# Patient Record
Sex: MALE | Race: WHITE | NOT HISPANIC OR LATINO
[De-identification: names, ages, dates, MRNs, and addresses within clinical notes are randomized per-mention and may not be internally consistent; named-entity substitution may affect disease eponyms.]

---

## 2017-01-19 ENCOUNTER — APPOINTMENT (OUTPATIENT)
Dept: HEMATOLOGY ONCOLOGY | Facility: CLINIC | Age: 82
End: 2017-01-19

## 2017-01-19 VITALS
TEMPERATURE: 97.6 F | BODY MASS INDEX: 22.07 KG/M2 | HEIGHT: 69 IN | WEIGHT: 149 LBS | RESPIRATION RATE: 12 BRPM | OXYGEN SATURATION: 97 % | HEART RATE: 76 BPM | SYSTOLIC BLOOD PRESSURE: 122 MMHG | DIASTOLIC BLOOD PRESSURE: 60 MMHG

## 2017-02-23 ENCOUNTER — APPOINTMENT (OUTPATIENT)
Dept: HEMATOLOGY ONCOLOGY | Facility: CLINIC | Age: 82
End: 2017-02-23

## 2017-02-23 ENCOUNTER — LABORATORY RESULT (OUTPATIENT)
Age: 82
End: 2017-02-23

## 2017-02-23 LAB
BASOPHILS NFR BLD AUTO: 0.3 %
EOSINOPHIL NFR BLD AUTO: 1.7 %
HCT VFR BLD CALC: 31.3 %
HGB BLD-MCNC: 10.6 G/DL
LYMPHOCYTES NFR BLD AUTO: 66.1 %
MAN DIFF?: NO
MCHC RBC-ENTMCNC: 31.3 PG
MCHC RBC-ENTMCNC: 33.9 G/DL
MCV RBC AUTO: 92.3 FL
MONOCYTES NFR BLD AUTO: 5.6 %
NEUTROPHILS NFR BLD AUTO: 26.3 %
PLATELET # BLD AUTO: 86 K/UL
RBC # BLD: 3.39 M/UL
RBC # FLD: 14.6 %
WBC # FLD AUTO: 7.7 K/UL

## 2017-03-09 ENCOUNTER — MESSAGE (OUTPATIENT)
Age: 82
End: 2017-03-09

## 2017-06-28 ENCOUNTER — APPOINTMENT (OUTPATIENT)
Dept: HEMATOLOGY ONCOLOGY | Facility: CLINIC | Age: 82
End: 2017-06-28

## 2017-06-28 ENCOUNTER — LABORATORY RESULT (OUTPATIENT)
Age: 82
End: 2017-06-28

## 2017-06-28 VITALS
WEIGHT: 149 LBS | HEART RATE: 70 BPM | OXYGEN SATURATION: 97 % | SYSTOLIC BLOOD PRESSURE: 130 MMHG | DIASTOLIC BLOOD PRESSURE: 70 MMHG | BODY MASS INDEX: 22.07 KG/M2 | TEMPERATURE: 98.2 F | RESPIRATION RATE: 14 BRPM | HEIGHT: 69 IN

## 2017-06-29 LAB
25(OH)D3 SERPL-MCNC: 47.7 NG/ML
BASOPHILS # BLD AUTO: 0.02 K/UL
BASOPHILS NFR BLD AUTO: 0.2 %
EOSINOPHIL # BLD AUTO: 0.09 K/UL
EOSINOPHIL NFR BLD AUTO: 1 %
ERYTHROCYTE [SEDIMENTATION RATE] IN BLOOD BY WESTERGREN METHOD: 4 MM/HR
FERRITIN SERPL-MCNC: 124 NG/ML
HAPTOGLOB SERPL-MCNC: 31 MG/DL
HCT VFR BLD CALC: 34.1 %
HGB BLD-MCNC: 10.8 G/DL
IMM GRANULOCYTES NFR BLD AUTO: 0 %
IRON SATN MFR SERPL: 28 %
IRON SERPL-MCNC: 77 UG/DL
LDH SERPL-CCNC: 261 U/L
LYMPHOCYTES # BLD AUTO: 6.71 K/UL
LYMPHOCYTES NFR BLD AUTO: 76.1 %
MAN DIFF?: NORMAL
MCHC RBC-ENTMCNC: 30.3 PG
MCHC RBC-ENTMCNC: 31.7 GM/DL
MCV RBC AUTO: 95.8 FL
MONOCYTES # BLD AUTO: 0.34 K/UL
MONOCYTES NFR BLD AUTO: 3.9 %
NEUTROPHILS # BLD AUTO: 1.66 K/UL
NEUTROPHILS NFR BLD AUTO: 18.8 %
PLATELET # BLD AUTO: 93 K/UL
RBC # BLD: 3.56 M/UL
RBC # FLD: 15.1 %
TIBC SERPL-MCNC: 272 UG/DL
UIBC SERPL-MCNC: 195 UG/DL
VIT B12 SERPL-MCNC: 609 PG/ML
WBC # FLD AUTO: 8.82 K/UL

## 2017-06-30 ENCOUNTER — APPOINTMENT (OUTPATIENT)
Dept: HEART AND VASCULAR | Facility: CLINIC | Age: 82
End: 2017-06-30

## 2017-06-30 VITALS — SYSTOLIC BLOOD PRESSURE: 140 MMHG | DIASTOLIC BLOOD PRESSURE: 70 MMHG

## 2017-06-30 VITALS
HEIGHT: 69 IN | BODY MASS INDEX: 22.22 KG/M2 | SYSTOLIC BLOOD PRESSURE: 160 MMHG | RESPIRATION RATE: 14 BRPM | TEMPERATURE: 97.2 F | WEIGHT: 150.03 LBS | OXYGEN SATURATION: 97 % | DIASTOLIC BLOOD PRESSURE: 72 MMHG | HEART RATE: 68 BPM

## 2017-06-30 DIAGNOSIS — Z87.891 PERSONAL HISTORY OF NICOTINE DEPENDENCE: ICD-10-CM

## 2017-06-30 DIAGNOSIS — Z78.9 OTHER SPECIFIED HEALTH STATUS: ICD-10-CM

## 2017-09-14 ENCOUNTER — APPOINTMENT (OUTPATIENT)
Dept: HEMATOLOGY ONCOLOGY | Facility: CLINIC | Age: 82
End: 2017-09-14
Payer: MEDICARE

## 2017-09-14 ENCOUNTER — LABORATORY RESULT (OUTPATIENT)
Age: 82
End: 2017-09-14

## 2017-09-14 VITALS
OXYGEN SATURATION: 97 % | DIASTOLIC BLOOD PRESSURE: 62 MMHG | TEMPERATURE: 97.7 F | HEART RATE: 71 BPM | SYSTOLIC BLOOD PRESSURE: 124 MMHG | HEIGHT: 69 IN | BODY MASS INDEX: 21.48 KG/M2 | RESPIRATION RATE: 14 BRPM | WEIGHT: 145 LBS

## 2017-09-14 PROCEDURE — G0008: CPT

## 2017-09-14 PROCEDURE — 99214 OFFICE O/P EST MOD 30 MIN: CPT | Mod: 25

## 2017-09-14 PROCEDURE — 36415 COLL VENOUS BLD VENIPUNCTURE: CPT

## 2017-09-14 PROCEDURE — 90662 IIV NO PRSV INCREASED AG IM: CPT

## 2017-09-15 LAB
ALBUMIN SERPL ELPH-MCNC: 4.5 G/DL
ALP BLD-CCNC: 70 U/L
ALT SERPL-CCNC: 9 U/L
ANION GAP SERPL CALC-SCNC: 17 MMOL/L
AST SERPL-CCNC: 24 U/L
BILIRUB SERPL-MCNC: 0.8 MG/DL
BUN SERPL-MCNC: 42 MG/DL
CALCIUM SERPL-MCNC: 9.4 MG/DL
CHLORIDE SERPL-SCNC: 101 MMOL/L
CO2 SERPL-SCNC: 25 MMOL/L
CREAT SERPL-MCNC: 2.09 MG/DL
ERYTHROCYTE [SEDIMENTATION RATE] IN BLOOD BY WESTERGREN METHOD: 5 MM/HR
FERRITIN SERPL-MCNC: 138 NG/ML
GLUCOSE SERPL-MCNC: 99 MG/DL
HAPTOGLOB SERPL-MCNC: 35 MG/DL
IRON SATN MFR SERPL: 29 %
IRON SERPL-MCNC: 75 UG/DL
LDH SERPL-CCNC: 299 U/L
POTASSIUM SERPL-SCNC: 4.8 MMOL/L
PROT SERPL-MCNC: 7.2 G/DL
SODIUM SERPL-SCNC: 143 MMOL/L
TIBC SERPL-MCNC: 259 UG/DL
UIBC SERPL-MCNC: 184 UG/DL
VIT B12 SERPL-MCNC: 757 PG/ML

## 2017-09-18 ENCOUNTER — APPOINTMENT (OUTPATIENT)
Dept: HEART AND VASCULAR | Facility: CLINIC | Age: 82
End: 2017-09-18
Payer: MEDICARE

## 2017-09-18 VITALS
BODY MASS INDEX: 21.62 KG/M2 | WEIGHT: 146 LBS | SYSTOLIC BLOOD PRESSURE: 98 MMHG | RESPIRATION RATE: 14 BRPM | HEART RATE: 70 BPM | DIASTOLIC BLOOD PRESSURE: 62 MMHG | OXYGEN SATURATION: 96 % | HEIGHT: 69 IN | TEMPERATURE: 98.1 F

## 2017-09-18 LAB
BASOPHILS # BLD AUTO: 0.02 K/UL
BASOPHILS NFR BLD AUTO: 0.2 %
EOSINOPHIL # BLD AUTO: 0.08 K/UL
EOSINOPHIL NFR BLD AUTO: 1 %
HCT VFR BLD CALC: 33.6 %
HGB BLD-MCNC: 11.1 G/DL
IMM GRANULOCYTES NFR BLD AUTO: 0 %
LYMPHOCYTES # BLD AUTO: 5.98 K/UL
LYMPHOCYTES NFR BLD AUTO: 71.4 %
MAN DIFF?: NORMAL
MCHC RBC-ENTMCNC: 31.4 PG
MCHC RBC-ENTMCNC: 33 GM/DL
MCV RBC AUTO: 95.2 FL
MONOCYTES # BLD AUTO: 0.36 K/UL
MONOCYTES NFR BLD AUTO: 4.3 %
NEUTROPHILS # BLD AUTO: 1.93 K/UL
NEUTROPHILS NFR BLD AUTO: 23.1 %
PLATELET # BLD AUTO: 92 K/UL
RBC # BLD: 3.53 M/UL
RBC # FLD: 14.4 %
WBC # FLD AUTO: 8.37 K/UL

## 2017-09-18 PROCEDURE — 99213 OFFICE O/P EST LOW 20 MIN: CPT | Mod: 25

## 2017-09-18 PROCEDURE — 93306 TTE W/DOPPLER COMPLETE: CPT

## 2017-09-18 RX ORDER — ISOSORBIDE MONONITRATE 30 MG/1
30 TABLET, EXTENDED RELEASE ORAL DAILY
Qty: 30 | Refills: 2 | Status: DISCONTINUED | COMMUNITY
Start: 2017-06-30 | End: 2017-09-18

## 2017-09-26 ENCOUNTER — CLINICAL ADVICE (OUTPATIENT)
Age: 82
End: 2017-09-26

## 2017-10-03 ENCOUNTER — TRANSCRIPTION ENCOUNTER (OUTPATIENT)
Age: 82
End: 2017-10-03

## 2017-10-04 ENCOUNTER — APPOINTMENT (OUTPATIENT)
Dept: HEART AND VASCULAR | Facility: CLINIC | Age: 82
End: 2017-10-04
Payer: MEDICARE

## 2017-10-04 PROCEDURE — A9500: CPT

## 2017-10-04 PROCEDURE — 78452 HT MUSCLE IMAGE SPECT MULT: CPT

## 2017-10-04 PROCEDURE — 99213 OFFICE O/P EST LOW 20 MIN: CPT | Mod: 25

## 2017-10-04 PROCEDURE — 93015 CV STRESS TEST SUPVJ I&R: CPT

## 2017-10-05 ENCOUNTER — LABORATORY RESULT (OUTPATIENT)
Age: 82
End: 2017-10-05

## 2017-10-05 ENCOUNTER — APPOINTMENT (OUTPATIENT)
Dept: NEPHROLOGY | Facility: CLINIC | Age: 82
End: 2017-10-05
Payer: MEDICARE

## 2017-10-05 VITALS
SYSTOLIC BLOOD PRESSURE: 128 MMHG | BODY MASS INDEX: 21.18 KG/M2 | TEMPERATURE: 98.1 F | RESPIRATION RATE: 14 BRPM | OXYGEN SATURATION: 96 % | WEIGHT: 143 LBS | HEART RATE: 71 BPM | DIASTOLIC BLOOD PRESSURE: 82 MMHG | HEIGHT: 69 IN

## 2017-10-05 PROCEDURE — 36415 COLL VENOUS BLD VENIPUNCTURE: CPT

## 2017-10-05 PROCEDURE — 99215 OFFICE O/P EST HI 40 MIN: CPT | Mod: 25

## 2017-10-06 LAB
25(OH)D3 SERPL-MCNC: 49.5 NG/ML
ALBUMIN SERPL ELPH-MCNC: 4.8 G/DL
ALP BLD-CCNC: 82 U/L
ALT SERPL-CCNC: 14 U/L
ANION GAP SERPL CALC-SCNC: 18 MMOL/L
APPEARANCE: CLEAR
AST SERPL-CCNC: 23 U/L
BACTERIA: NEGATIVE
BILIRUB SERPL-MCNC: 0.9 MG/DL
BILIRUBIN URINE: NEGATIVE
BLOOD URINE: ABNORMAL
BUN SERPL-MCNC: 45 MG/DL
CALCIUM SERPL-MCNC: 9.5 MG/DL
CALCIUM SERPL-MCNC: 9.5 MG/DL
CHLORIDE SERPL-SCNC: 102 MMOL/L
CO2 SERPL-SCNC: 21 MMOL/L
COLOR: YELLOW
CREAT SERPL-MCNC: 1.67 MG/DL
CREAT SPEC-SCNC: 115 MG/DL
CREAT/PROT UR: 0.1 RATIO
GLUCOSE QUALITATIVE U: NEGATIVE MG/DL
GLUCOSE SERPL-MCNC: 102 MG/DL
KETONES URINE: NEGATIVE
LEUKOCYTE ESTERASE URINE: NEGATIVE
MAGNESIUM SERPL-MCNC: 1.8 MG/DL
MICROSCOPIC-UA: NORMAL
NITRITE URINE: NEGATIVE
PARATHYROID HORMONE INTACT: 86 PG/ML
PH URINE: 5
PHOSPHATE SERPL-MCNC: 3.8 MG/DL
POTASSIUM SERPL-SCNC: 4.4 MMOL/L
PROT SERPL-MCNC: 7.1 G/DL
PROT UR-MCNC: 6 MG/DL
PROTEIN URINE: NEGATIVE MG/DL
RED BLOOD CELLS URINE: 6 /HPF
SODIUM SERPL-SCNC: 141 MMOL/L
SPECIFIC GRAVITY URINE: 1.02
SQUAMOUS EPITHELIAL CELLS: 0 /HPF
URATE SERPL-MCNC: 9.8 MG/DL
UROBILINOGEN URINE: NEGATIVE MG/DL
WHITE BLOOD CELLS URINE: 2 /HPF

## 2017-10-10 LAB
APPEARANCE: CLEAR
BACTERIA: NEGATIVE
BILIRUBIN URINE: NEGATIVE
BLOOD URINE: ABNORMAL
COLOR: YELLOW
CREAT SPEC-SCNC: 79 MG/DL
GLUCOSE QUALITATIVE U: NEGATIVE MG/DL
HYALINE CASTS: 1 /LPF
KETONES URINE: NEGATIVE
LEUKOCYTE ESTERASE URINE: NEGATIVE
MICROALBUMIN 24H UR DL<=1MG/L-MCNC: 1.6 MG/DL
MICROALBUMIN/CREAT 24H UR-RTO: 20 MG/G
MICROSCOPIC-UA: NORMAL
NITRITE URINE: NEGATIVE
PH URINE: 5
PROTEIN URINE: NEGATIVE MG/DL
RED BLOOD CELLS URINE: 2 /HPF
SPECIFIC GRAVITY URINE: 1.01
SQUAMOUS EPITHELIAL CELLS: 0 /HPF
UROBILINOGEN URINE: NEGATIVE MG/DL
WHITE BLOOD CELLS URINE: 0 /HPF

## 2017-10-11 ENCOUNTER — MESSAGE (OUTPATIENT)
Age: 82
End: 2017-10-11

## 2017-10-13 ENCOUNTER — MESSAGE (OUTPATIENT)
Age: 82
End: 2017-10-13

## 2017-12-13 ENCOUNTER — APPOINTMENT (OUTPATIENT)
Dept: HEMATOLOGY ONCOLOGY | Facility: CLINIC | Age: 82
End: 2017-12-13
Payer: MEDICARE

## 2017-12-13 ENCOUNTER — LABORATORY RESULT (OUTPATIENT)
Age: 82
End: 2017-12-13

## 2017-12-13 VITALS
OXYGEN SATURATION: 96 % | SYSTOLIC BLOOD PRESSURE: 108 MMHG | HEART RATE: 66 BPM | WEIGHT: 144 LBS | TEMPERATURE: 98.3 F | BODY MASS INDEX: 21.33 KG/M2 | DIASTOLIC BLOOD PRESSURE: 62 MMHG | HEIGHT: 69 IN | RESPIRATION RATE: 14 BRPM

## 2017-12-13 PROCEDURE — 36415 COLL VENOUS BLD VENIPUNCTURE: CPT

## 2017-12-13 PROCEDURE — 99213 OFFICE O/P EST LOW 20 MIN: CPT | Mod: 25

## 2017-12-15 LAB
ALBUMIN SERPL ELPH-MCNC: 4.2 G/DL
ALP BLD-CCNC: 76 U/L
ALT SERPL-CCNC: 10 U/L
ANION GAP SERPL CALC-SCNC: 17 MMOL/L
AST SERPL-CCNC: 17 U/L
BASOPHILS # BLD AUTO: 0.03 K/UL
BASOPHILS NFR BLD AUTO: 0.4 %
BILIRUB SERPL-MCNC: 0.9 MG/DL
BUN SERPL-MCNC: 46 MG/DL
CALCIUM SERPL-MCNC: 9.3 MG/DL
CHLORIDE SERPL-SCNC: 102 MMOL/L
CO2 SERPL-SCNC: 24 MMOL/L
CREAT SERPL-MCNC: 1.75 MG/DL
EOSINOPHIL # BLD AUTO: 0.11 K/UL
EOSINOPHIL NFR BLD AUTO: 1.4
GLUCOSE SERPL-MCNC: 115 MG/DL
HCT VFR BLD CALC: 31.9 %
HGB BLD-MCNC: 10.3 G/DL
IMM GRANULOCYTES NFR BLD AUTO: 0.1 %
LYMPHOCYTES # BLD AUTO: 5.41 K/UL
LYMPHOCYTES NFR BLD AUTO: 69.2 %
MAN DIFF?: NORMAL
MCHC RBC-ENTMCNC: 31.3 PG
MCHC RBC-ENTMCNC: 32.3 GM/DL
MCV RBC AUTO: 97 FL
MONOCYTES # BLD AUTO: 0.36 K/UL
MONOCYTES NFR BLD AUTO: 4.6 %
NEUTROPHILS # BLD AUTO: 1.9 K/UL
NEUTROPHILS NFR BLD AUTO: 24.3 %
PLATELET # BLD AUTO: 95 K/UL
POTASSIUM SERPL-SCNC: 5 MMOL/L
PROT SERPL-MCNC: 6.7 G/DL
RBC # BLD: 3.29 M/UL
RBC # FLD: 14.2 %
SODIUM SERPL-SCNC: 143 MMOL/L
WBC # FLD AUTO: 7.82 K/UL

## 2018-03-28 ENCOUNTER — LABORATORY RESULT (OUTPATIENT)
Age: 83
End: 2018-03-28

## 2018-03-28 ENCOUNTER — APPOINTMENT (OUTPATIENT)
Dept: HEMATOLOGY ONCOLOGY | Facility: CLINIC | Age: 83
End: 2018-03-28
Payer: MEDICARE

## 2018-03-28 PROCEDURE — 99214 OFFICE O/P EST MOD 30 MIN: CPT | Mod: 25

## 2018-03-28 PROCEDURE — 36415 COLL VENOUS BLD VENIPUNCTURE: CPT

## 2018-03-29 LAB
ALBUMIN SERPL ELPH-MCNC: 4.1 G/DL
ALP BLD-CCNC: 55 U/L
ALT SERPL-CCNC: 10 U/L
ANION GAP SERPL CALC-SCNC: 13 MMOL/L
AST SERPL-CCNC: 17 U/L
BASOPHILS # BLD AUTO: 0.01 K/UL
BASOPHILS NFR BLD AUTO: 0.1 %
BILIRUB SERPL-MCNC: 0.6 MG/DL
BUN SERPL-MCNC: 36 MG/DL
CALCIUM SERPL-MCNC: 9.2 MG/DL
CHLORIDE SERPL-SCNC: 103 MMOL/L
CO2 SERPL-SCNC: 26 MMOL/L
CREAT SERPL-MCNC: 1.56 MG/DL
EOSINOPHIL # BLD AUTO: 0.11 K/UL
EOSINOPHIL NFR BLD AUTO: 1.3 %
ERYTHROCYTE [SEDIMENTATION RATE] IN BLOOD BY WESTERGREN METHOD: 5 MM/HR
GLUCOSE SERPL-MCNC: 87 MG/DL
HCT VFR BLD CALC: 30.4 %
HGB BLD-MCNC: 9.7 G/DL
IMM GRANULOCYTES NFR BLD AUTO: 0.1 %
LDH SERPL-CCNC: 280 U/L
LYMPHOCYTES # BLD AUTO: 5.07 K/UL
LYMPHOCYTES NFR BLD AUTO: 62.2 %
MAN DIFF?: NORMAL
MCHC RBC-ENTMCNC: 30.6 PG
MCHC RBC-ENTMCNC: 31.9 GM/DL
MCV RBC AUTO: 95.9 FL
MONOCYTES # BLD AUTO: 0.41 K/UL
MONOCYTES NFR BLD AUTO: 5 %
NEUTROPHILS # BLD AUTO: 2.54 K/UL
NEUTROPHILS NFR BLD AUTO: 31.3 %
PLATELET # BLD AUTO: 116 K/UL
POTASSIUM SERPL-SCNC: 5.1 MMOL/L
PROT SERPL-MCNC: 6.7 G/DL
RBC # BLD: 3.17 M/UL
RBC # FLD: 14.7 %
SODIUM SERPL-SCNC: 142 MMOL/L
WBC # FLD AUTO: 8.15 K/UL

## 2018-07-03 ENCOUNTER — APPOINTMENT (OUTPATIENT)
Dept: HEMATOLOGY ONCOLOGY | Facility: CLINIC | Age: 83
End: 2018-07-03
Payer: MEDICARE

## 2018-07-03 VITALS
HEIGHT: 69 IN | OXYGEN SATURATION: 97 % | WEIGHT: 148 LBS | HEART RATE: 64 BPM | RESPIRATION RATE: 16 BRPM | DIASTOLIC BLOOD PRESSURE: 68 MMHG | BODY MASS INDEX: 21.92 KG/M2 | SYSTOLIC BLOOD PRESSURE: 155 MMHG | TEMPERATURE: 98 F

## 2018-07-03 PROCEDURE — 99214 OFFICE O/P EST MOD 30 MIN: CPT

## 2018-08-08 ENCOUNTER — APPOINTMENT (OUTPATIENT)
Dept: HEART AND VASCULAR | Facility: CLINIC | Age: 83
End: 2018-08-08
Payer: MEDICARE

## 2018-08-08 VITALS
DIASTOLIC BLOOD PRESSURE: 60 MMHG | HEART RATE: 66 BPM | RESPIRATION RATE: 14 BRPM | BODY MASS INDEX: 22.96 KG/M2 | WEIGHT: 148.02 LBS | TEMPERATURE: 98.1 F | SYSTOLIC BLOOD PRESSURE: 138 MMHG | OXYGEN SATURATION: 97 % | HEIGHT: 67.5 IN

## 2018-08-08 DIAGNOSIS — K21.9 GASTRO-ESOPHAGEAL REFLUX DISEASE W/OUT ESOPHAGITIS: ICD-10-CM

## 2018-08-08 DIAGNOSIS — M54.2 CERVICALGIA: ICD-10-CM

## 2018-08-08 PROCEDURE — 99214 OFFICE O/P EST MOD 30 MIN: CPT | Mod: 25

## 2018-08-08 PROCEDURE — 93000 ELECTROCARDIOGRAM COMPLETE: CPT

## 2018-08-13 ENCOUNTER — APPOINTMENT (OUTPATIENT)
Dept: HEMATOLOGY ONCOLOGY | Facility: CLINIC | Age: 83
End: 2018-08-13
Payer: MEDICARE

## 2018-08-13 ENCOUNTER — LABORATORY RESULT (OUTPATIENT)
Age: 83
End: 2018-08-13

## 2018-08-13 VITALS
HEART RATE: 61 BPM | BODY MASS INDEX: 22.96 KG/M2 | SYSTOLIC BLOOD PRESSURE: 143 MMHG | DIASTOLIC BLOOD PRESSURE: 73 MMHG | OXYGEN SATURATION: 97 % | WEIGHT: 148 LBS | TEMPERATURE: 98.1 F | HEIGHT: 67.5 IN | RESPIRATION RATE: 14 BRPM

## 2018-08-13 VITALS — SYSTOLIC BLOOD PRESSURE: 118 MMHG | DIASTOLIC BLOOD PRESSURE: 63 MMHG

## 2018-08-13 PROCEDURE — 99214 OFFICE O/P EST MOD 30 MIN: CPT | Mod: 25

## 2018-08-13 PROCEDURE — 36415 COLL VENOUS BLD VENIPUNCTURE: CPT

## 2018-08-14 LAB
ALBUMIN SERPL ELPH-MCNC: 4.3 G/DL
ALP BLD-CCNC: 65 U/L
ALT SERPL-CCNC: 11 U/L
ANION GAP SERPL CALC-SCNC: 14 MMOL/L
AST SERPL-CCNC: 22 U/L
BASOPHILS # BLD AUTO: 0 K/UL
BASOPHILS NFR BLD AUTO: 0 %
BILIRUB SERPL-MCNC: 0.6 MG/DL
BUN SERPL-MCNC: 34 MG/DL
CALCIUM SERPL-MCNC: 8.9 MG/DL
CHLORIDE SERPL-SCNC: 104 MMOL/L
CO2 SERPL-SCNC: 24 MMOL/L
CREAT SERPL-MCNC: 1.36 MG/DL
EOSINOPHIL # BLD AUTO: 0.31 K/UL
EOSINOPHIL NFR BLD AUTO: 2.6 %
ERYTHROCYTE [SEDIMENTATION RATE] IN BLOOD BY WESTERGREN METHOD: 3 MM/HR
GLUCOSE SERPL-MCNC: 90 MG/DL
HCT VFR BLD CALC: 36.1 %
HGB BLD-MCNC: 11.5 G/DL
LDH SERPL-CCNC: 275 U/L
LYMPHOCYTES # BLD AUTO: 10.01 K/UL
LYMPHOCYTES NFR BLD AUTO: 82.9 %
MAN DIFF?: NORMAL
MCHC RBC-ENTMCNC: 30.2 PG
MCHC RBC-ENTMCNC: 31.9 GM/DL
MCV RBC AUTO: 94.8 FL
MONOCYTES # BLD AUTO: 0.21 K/UL
MONOCYTES NFR BLD AUTO: 1.7 %
NEUTROPHILS # BLD AUTO: 1.55 K/UL
NEUTROPHILS NFR BLD AUTO: 12.8 %
PLATELET # BLD AUTO: 105 K/UL
POTASSIUM SERPL-SCNC: 4.7 MMOL/L
PROT SERPL-MCNC: 7 G/DL
RBC # BLD: 3.81 M/UL
RBC # FLD: 15.6 %
SODIUM SERPL-SCNC: 142 MMOL/L
VIT B12 SERPL-MCNC: 815 PG/ML
WBC # FLD AUTO: 12.08 K/UL

## 2018-10-16 ENCOUNTER — APPOINTMENT (OUTPATIENT)
Dept: HEMATOLOGY ONCOLOGY | Facility: CLINIC | Age: 83
End: 2018-10-16
Payer: MEDICARE

## 2018-10-16 ENCOUNTER — LABORATORY RESULT (OUTPATIENT)
Age: 83
End: 2018-10-16

## 2018-10-16 VITALS
HEART RATE: 70 BPM | TEMPERATURE: 97.5 F | WEIGHT: 144 LBS | BODY MASS INDEX: 22.34 KG/M2 | HEIGHT: 67.5 IN | SYSTOLIC BLOOD PRESSURE: 139 MMHG | RESPIRATION RATE: 14 BRPM | DIASTOLIC BLOOD PRESSURE: 69 MMHG | OXYGEN SATURATION: 97 %

## 2018-10-16 PROCEDURE — 36415 COLL VENOUS BLD VENIPUNCTURE: CPT

## 2018-10-16 PROCEDURE — 99214 OFFICE O/P EST MOD 30 MIN: CPT | Mod: 25

## 2018-10-17 LAB
ALBUMIN SERPL ELPH-MCNC: 4.5 G/DL
ALP BLD-CCNC: 74 U/L
ALT SERPL-CCNC: 11 U/L
ANION GAP SERPL CALC-SCNC: 13 MMOL/L
AST SERPL-CCNC: 18 U/L
BASOPHILS # BLD AUTO: 0.02 K/UL
BASOPHILS NFR BLD AUTO: 0.2 %
BILIRUB SERPL-MCNC: 0.9 MG/DL
BUN SERPL-MCNC: 34 MG/DL
CALCIUM SERPL-MCNC: 8.2 MG/DL
CHLORIDE SERPL-SCNC: 101 MMOL/L
CO2 SERPL-SCNC: 24 MMOL/L
CREAT SERPL-MCNC: 1.43 MG/DL
EOSINOPHIL # BLD AUTO: 0.14 K/UL
EOSINOPHIL NFR BLD AUTO: 1.2 %
ERYTHROCYTE [SEDIMENTATION RATE] IN BLOOD BY WESTERGREN METHOD: 11 MM/HR
GLUCOSE SERPL-MCNC: 93 MG/DL
HCT VFR BLD CALC: 33.8 %
HGB BLD-MCNC: 11.2 G/DL
IMM GRANULOCYTES NFR BLD AUTO: 0.1 %
LDH SERPL-CCNC: 287 U/L
LYMPHOCYTES # BLD AUTO: 8.75 K/UL
LYMPHOCYTES NFR BLD AUTO: 76.6 %
MAN DIFF?: NORMAL
MCHC RBC-ENTMCNC: 31.2 PG
MCHC RBC-ENTMCNC: 33.1 GM/DL
MCV RBC AUTO: 94.2 FL
MONOCYTES # BLD AUTO: 0.41 K/UL
MONOCYTES NFR BLD AUTO: 3.6 %
NEUTROPHILS # BLD AUTO: 2.1 K/UL
NEUTROPHILS NFR BLD AUTO: 18.3 %
PLATELET # BLD AUTO: 127 K/UL
POTASSIUM SERPL-SCNC: 4.8 MMOL/L
PROT SERPL-MCNC: 7.1 G/DL
RBC # BLD: 3.59 M/UL
RBC # FLD: 15.9 %
SODIUM SERPL-SCNC: 138 MMOL/L
WBC # FLD AUTO: 11.43 K/UL

## 2018-12-18 ENCOUNTER — APPOINTMENT (OUTPATIENT)
Dept: INTERNAL MEDICINE | Facility: CLINIC | Age: 83
End: 2018-12-18
Payer: MEDICARE

## 2018-12-18 ENCOUNTER — LABORATORY RESULT (OUTPATIENT)
Age: 83
End: 2018-12-18

## 2018-12-18 VITALS
HEIGHT: 67 IN | WEIGHT: 142 LBS | OXYGEN SATURATION: 97 % | DIASTOLIC BLOOD PRESSURE: 64 MMHG | BODY MASS INDEX: 22.29 KG/M2 | HEART RATE: 76 BPM | SYSTOLIC BLOOD PRESSURE: 138 MMHG | TEMPERATURE: 97.5 F

## 2018-12-18 PROCEDURE — 36415 COLL VENOUS BLD VENIPUNCTURE: CPT

## 2018-12-18 PROCEDURE — 99397 PER PM REEVAL EST PAT 65+ YR: CPT

## 2018-12-18 PROCEDURE — 93000 ELECTROCARDIOGRAM COMPLETE: CPT

## 2018-12-18 RX ORDER — GABAPENTIN 300 MG/1
300 CAPSULE ORAL TWICE DAILY
Qty: 60 | Refills: 0 | Status: DISCONTINUED | COMMUNITY
Start: 2018-08-08 | End: 2018-12-18

## 2018-12-18 NOTE — PLAN
[FreeTextEntry1] : Wellness complete\par labs today\par  CKD - check bmp today- has been stable\par CAD- asa, statin, plavix, \par  - HTN controlled on ACE, thiazide\par CLL - cont clse f/up with Dr Oliveira\par \par plan f/up 6 months

## 2018-12-18 NOTE — HISTORY OF PRESENT ILLNESS
[FreeTextEntry1] : WEllness [de-identified] : Feeling well. \par Oa [facility]. [facility] , taking tylenol prn and going for another consult this Thursday has had injection - did not help.  \par Tried gabapentin but it did not help.  \par Has lost weight on purpose.  Eating a healthier diet - a total of 60 pounds. - feeling much better. walking more.\par Already had his flu shot\par Had pna vacc in the past.\par Had zostavax in the past.

## 2018-12-19 LAB
25(OH)D3 SERPL-MCNC: 42.3 NG/ML
ALBUMIN SERPL ELPH-MCNC: 4.9 G/DL
ALP BLD-CCNC: 73 U/L
ALT SERPL-CCNC: 12 U/L
ANION GAP SERPL CALC-SCNC: 12 MMOL/L
APPEARANCE: CLEAR
AST SERPL-CCNC: 18 U/L
BASOPHILS # BLD AUTO: 0 K/UL
BASOPHILS NFR BLD AUTO: 0 %
BILIRUB SERPL-MCNC: 1 MG/DL
BILIRUBIN URINE: NEGATIVE
BLOOD URINE: NEGATIVE
BUN SERPL-MCNC: 44 MG/DL
CALCIUM SERPL-MCNC: 9.5 MG/DL
CALCIUM SERPL-MCNC: 9.5 MG/DL
CHLORIDE SERPL-SCNC: 103 MMOL/L
CHOLEST SERPL-MCNC: 120 MG/DL
CHOLEST/HDLC SERPL: 3.4 RATIO
CO2 SERPL-SCNC: 28 MMOL/L
COLOR: YELLOW
CREAT SERPL-MCNC: 1.78 MG/DL
EOSINOPHIL # BLD AUTO: 0.17 K/UL
EOSINOPHIL NFR BLD AUTO: 1.8 %
GLUCOSE QUALITATIVE U: NEGATIVE MG/DL
GLUCOSE SERPL-MCNC: 110 MG/DL
HBA1C MFR BLD HPLC: 5.2 %
HCT VFR BLD CALC: 36.1 %
HDLC SERPL-MCNC: 35 MG/DL
HGB BLD-MCNC: 11.3 G/DL
KETONES URINE: NEGATIVE
LDLC SERPL CALC-MCNC: 61 MG/DL
LEUKOCYTE ESTERASE URINE: NEGATIVE
LYMPHOCYTES # BLD AUTO: 6.46 K/UL
LYMPHOCYTES NFR BLD AUTO: 66.9 %
MAGNESIUM SERPL-MCNC: 1.9 MG/DL
MAN DIFF?: NORMAL
MCHC RBC-ENTMCNC: 30.5 PG
MCHC RBC-ENTMCNC: 31.3 GM/DL
MCV RBC AUTO: 97.3 FL
MONOCYTES # BLD AUTO: 0.35 K/UL
MONOCYTES NFR BLD AUTO: 3.6 %
NEUTROPHILS # BLD AUTO: 2.67 K/UL
NEUTROPHILS NFR BLD AUTO: 27.7 %
NITRITE URINE: NEGATIVE
PARATHYROID HORMONE INTACT: 64 PG/ML
PH URINE: 6.5
PHOSPHATE SERPL-MCNC: 3.9 MG/DL
PLATELET # BLD AUTO: 103 K/UL
POTASSIUM SERPL-SCNC: 4.4 MMOL/L
PROT SERPL-MCNC: 7.1 G/DL
PROTEIN URINE: NEGATIVE MG/DL
RBC # BLD: 3.71 M/UL
RBC # FLD: 15.1 %
SODIUM SERPL-SCNC: 143 MMOL/L
SPECIFIC GRAVITY URINE: 1.01
TRIGL SERPL-MCNC: 121 MG/DL
URATE SERPL-MCNC: 9.7 MG/DL
UROBILINOGEN URINE: NEGATIVE MG/DL
WBC # FLD AUTO: 9.65 K/UL

## 2019-01-15 ENCOUNTER — APPOINTMENT (OUTPATIENT)
Dept: HEMATOLOGY ONCOLOGY | Facility: CLINIC | Age: 84
End: 2019-01-15
Payer: MEDICARE

## 2019-01-15 VITALS
HEART RATE: 69 BPM | HEIGHT: 67 IN | RESPIRATION RATE: 14 BRPM | OXYGEN SATURATION: 97 % | WEIGHT: 140 LBS | SYSTOLIC BLOOD PRESSURE: 149 MMHG | BODY MASS INDEX: 21.97 KG/M2 | TEMPERATURE: 97.5 F | DIASTOLIC BLOOD PRESSURE: 68 MMHG

## 2019-01-15 PROCEDURE — 99214 OFFICE O/P EST MOD 30 MIN: CPT

## 2019-01-15 NOTE — ASSESSMENT
[FreeTextEntry1] : Repeat CBC, CMP in December was stable...\par \par discussed with patient the fact that he is becoming more anemic and more thrombocytopenic because of his  CLL and I discussed with pt starting Imbruvica upon his return from [facility].\par \par \par

## 2019-01-15 NOTE — CONSULT LETTER
[Dear  ___] : Dear  [unfilled], [Consult Letter:] : I had the pleasure of evaluating your patient, [unfilled]. [Please see my note below.] : Please see my note below. [Consult Closing:] : Thank you very much for allowing me to participate in the care of this patient.  If you have any questions, please do not hesitate to contact me. [Sincerely,] : Sincerely, [FreeTextEntry3] : DS [DrJose  ___] : Dr. KUMAR

## 2019-01-15 NOTE — HISTORY OF PRESENT ILLNESS
[de-identified] : patient with CLL, here for f/u.\par   Most recently patient became more anemic and more thrombocytopenic.Has no complaints, feeling fine, went to  [place] in September continues to loose wt....\par

## 2019-01-17 ENCOUNTER — MESSAGE (OUTPATIENT)
Age: 84
End: 2019-01-17

## 2019-02-07 ENCOUNTER — APPOINTMENT (OUTPATIENT)
Dept: HEART AND VASCULAR | Facility: CLINIC | Age: 84
End: 2019-02-07
Payer: MEDICARE

## 2019-02-07 VITALS
RESPIRATION RATE: 14 BRPM | HEART RATE: 70 BPM | WEIGHT: 140 LBS | BODY MASS INDEX: 21.97 KG/M2 | TEMPERATURE: 97.2 F | DIASTOLIC BLOOD PRESSURE: 60 MMHG | SYSTOLIC BLOOD PRESSURE: 140 MMHG | OXYGEN SATURATION: 97 % | HEIGHT: 67 IN

## 2019-02-07 DIAGNOSIS — I35.1 NONRHEUMATIC AORTIC (VALVE) INSUFFICIENCY: ICD-10-CM

## 2019-02-07 PROCEDURE — 93306 TTE W/DOPPLER COMPLETE: CPT

## 2019-02-07 PROCEDURE — 99214 OFFICE O/P EST MOD 30 MIN: CPT

## 2019-02-07 NOTE — DISCUSSION/SUMMARY
[FreeTextEntry1] : At the time of the patient's visit an Echocardiogram was performed to evaluate his LV function. \par \par At the time of the visit the results were reviewed with patient\par \par I informed the patient that I did not find a Cardiovascular contraindication to travel to [place]

## 2019-02-07 NOTE — HISTORY OF PRESENT ILLNESS
[FreeTextEntry1] : 89 year male who notes anxiety about progression of carotid disease. [facility] and had an [facility]. He would like to gain more weight. He denies having any chest pain, SOB, FISH, orthopnea or PND

## 2019-02-22 ENCOUNTER — MEDICATION RENEWAL (OUTPATIENT)
Age: 84
End: 2019-02-22

## 2019-02-26 ENCOUNTER — MEDICATION RENEWAL (OUTPATIENT)
Age: 84
End: 2019-02-26

## 2019-03-12 ENCOUNTER — MESSAGE (OUTPATIENT)
Age: 84
End: 2019-03-12

## 2019-03-12 ENCOUNTER — CLINICAL ADVICE (OUTPATIENT)
Age: 84
End: 2019-03-12

## 2019-04-24 ENCOUNTER — INPATIENT (INPATIENT)
Facility: HOSPITAL | Age: 84
LOS: 1 days | Discharge: NURSING, REHAB OR LONG-TERM CARE | End: 2019-04-26
Attending: HOSPITALIST | Admitting: HOSPITALIST
Payer: MEDICARE

## 2019-04-24 ENCOUNTER — TRANSCRIPTION ENCOUNTER (OUTPATIENT)
Age: 84
End: 2019-04-24

## 2019-04-24 VITALS
DIASTOLIC BLOOD PRESSURE: 54 MMHG | TEMPERATURE: 98 F | SYSTOLIC BLOOD PRESSURE: 114 MMHG | RESPIRATION RATE: 16 BRPM | HEART RATE: 74 BPM | OXYGEN SATURATION: 99 %

## 2019-04-24 DIAGNOSIS — K27.9 PEPTIC ULCER, SITE UNSPECIFIED, UNSPECIFIED AS ACUTE OR CHRONIC, WITHOUT HEMORRHAGE OR PERFORATION: ICD-10-CM

## 2019-04-24 DIAGNOSIS — C91.90 LYMPHOID LEUKEMIA, UNSPECIFIED NOT HAVING ACHIEVED REMISSION: ICD-10-CM

## 2019-04-24 DIAGNOSIS — D64.9 ANEMIA, UNSPECIFIED: ICD-10-CM

## 2019-04-24 DIAGNOSIS — Z29.9 ENCOUNTER FOR PROPHYLACTIC MEASURES, UNSPECIFIED: ICD-10-CM

## 2019-04-24 DIAGNOSIS — I25.10 ATHEROSCLEROTIC HEART DISEASE OF NATIVE CORONARY ARTERY WITHOUT ANGINA PECTORIS: ICD-10-CM

## 2019-04-24 LAB
ALBUMIN SERPL ELPH-MCNC: 2.5 G/DL — LOW (ref 3.3–5)
ALP SERPL-CCNC: 52 U/L — SIGNIFICANT CHANGE UP (ref 40–120)
ALT FLD-CCNC: 48 U/L — HIGH (ref 4–41)
ANION GAP SERPL CALC-SCNC: 12 MMO/L — SIGNIFICANT CHANGE UP (ref 7–14)
AST SERPL-CCNC: 34 U/L — SIGNIFICANT CHANGE UP (ref 4–40)
BASOPHILS # BLD AUTO: 0.02 K/UL — SIGNIFICANT CHANGE UP (ref 0–0.2)
BASOPHILS NFR BLD AUTO: 0.3 % — SIGNIFICANT CHANGE UP (ref 0–2)
BILIRUB SERPL-MCNC: 0.7 MG/DL — SIGNIFICANT CHANGE UP (ref 0.2–1.2)
BLD GP AB SCN SERPL QL: NEGATIVE — SIGNIFICANT CHANGE UP
BUN SERPL-MCNC: 28 MG/DL — HIGH (ref 7–23)
CALCIUM SERPL-MCNC: 7.8 MG/DL — LOW (ref 8.4–10.5)
CHLORIDE SERPL-SCNC: 109 MMOL/L — HIGH (ref 98–107)
CO2 SERPL-SCNC: 20 MMOL/L — LOW (ref 22–31)
CREAT SERPL-MCNC: 1.15 MG/DL — SIGNIFICANT CHANGE UP (ref 0.5–1.3)
EOSINOPHIL # BLD AUTO: 0.08 K/UL — SIGNIFICANT CHANGE UP (ref 0–0.5)
EOSINOPHIL NFR BLD AUTO: 1.4 % — SIGNIFICANT CHANGE UP (ref 0–6)
GLUCOSE SERPL-MCNC: 100 MG/DL — HIGH (ref 70–99)
HCT VFR BLD CALC: 24.4 % — LOW (ref 39–50)
HGB BLD-MCNC: 7.7 G/DL — LOW (ref 13–17)
IMM GRANULOCYTES NFR BLD AUTO: 0.3 % — SIGNIFICANT CHANGE UP (ref 0–1.5)
LYMPHOCYTES # BLD AUTO: 3.39 K/UL — HIGH (ref 1–3.3)
LYMPHOCYTES # BLD AUTO: 57.6 % — HIGH (ref 13–44)
MCHC RBC-ENTMCNC: 30.3 PG — SIGNIFICANT CHANGE UP (ref 27–34)
MCHC RBC-ENTMCNC: 31.6 % — LOW (ref 32–36)
MCV RBC AUTO: 96.1 FL — SIGNIFICANT CHANGE UP (ref 80–100)
MONOCYTES # BLD AUTO: 0.25 K/UL — SIGNIFICANT CHANGE UP (ref 0–0.9)
MONOCYTES NFR BLD AUTO: 4.2 % — SIGNIFICANT CHANGE UP (ref 2–14)
NEUTROPHILS # BLD AUTO: 2.13 K/UL — SIGNIFICANT CHANGE UP (ref 1.8–7.4)
NEUTROPHILS NFR BLD AUTO: 36.2 % — LOW (ref 43–77)
NRBC # FLD: 0 K/UL — SIGNIFICANT CHANGE UP (ref 0–0)
OB PNL STL: NEGATIVE — SIGNIFICANT CHANGE UP
PLATELET # BLD AUTO: 101 K/UL — LOW (ref 150–400)
PMV BLD: 10.3 FL — SIGNIFICANT CHANGE UP (ref 7–13)
POTASSIUM SERPL-MCNC: 3.6 MMOL/L — SIGNIFICANT CHANGE UP (ref 3.5–5.3)
POTASSIUM SERPL-SCNC: 3.6 MMOL/L — SIGNIFICANT CHANGE UP (ref 3.5–5.3)
PROT SERPL-MCNC: 5.1 G/DL — LOW (ref 6–8.3)
RBC # BLD: 2.54 M/UL — LOW (ref 4.2–5.8)
RBC # FLD: 14.9 % — HIGH (ref 10.3–14.5)
RH IG SCN BLD-IMP: NEGATIVE — SIGNIFICANT CHANGE UP
RH IG SCN BLD-IMP: NEGATIVE — SIGNIFICANT CHANGE UP
SODIUM SERPL-SCNC: 141 MMOL/L — SIGNIFICANT CHANGE UP (ref 135–145)
WBC # BLD: 5.89 K/UL — SIGNIFICANT CHANGE UP (ref 3.8–10.5)
WBC # FLD AUTO: 5.89 K/UL — SIGNIFICANT CHANGE UP (ref 3.8–10.5)

## 2019-04-24 PROCEDURE — 99223 1ST HOSP IP/OBS HIGH 75: CPT

## 2019-04-24 RX ORDER — PANTOPRAZOLE SODIUM 20 MG/1
40 TABLET, DELAYED RELEASE ORAL
Qty: 0 | Refills: 0 | Status: DISCONTINUED | OUTPATIENT
Start: 2019-04-25 | End: 2019-04-26

## 2019-04-24 RX ORDER — PANTOPRAZOLE SODIUM 20 MG/1
80 TABLET, DELAYED RELEASE ORAL ONCE
Qty: 0 | Refills: 0 | Status: COMPLETED | OUTPATIENT
Start: 2019-04-24 | End: 2019-04-24

## 2019-04-24 RX ORDER — CHOLECALCIFEROL (VITAMIN D3) 125 MCG
400 CAPSULE ORAL DAILY
Qty: 0 | Refills: 0 | Status: DISCONTINUED | OUTPATIENT
Start: 2019-04-24 | End: 2019-04-26

## 2019-04-24 RX ORDER — ATORVASTATIN CALCIUM 80 MG/1
40 TABLET, FILM COATED ORAL AT BEDTIME
Qty: 0 | Refills: 0 | Status: DISCONTINUED | OUTPATIENT
Start: 2019-04-24 | End: 2019-04-26

## 2019-04-24 RX ORDER — ACETAMINOPHEN 500 MG
650 TABLET ORAL ONCE
Qty: 0 | Refills: 0 | Status: COMPLETED | OUTPATIENT
Start: 2019-04-24 | End: 2019-04-24

## 2019-04-24 RX ADMIN — Medication 1 TABLET(S): at 13:57

## 2019-04-24 RX ADMIN — Medication 650 MILLIGRAM(S): at 12:32

## 2019-04-24 RX ADMIN — Medication 200 MILLIGRAM(S): at 20:53

## 2019-04-24 RX ADMIN — PANTOPRAZOLE SODIUM 80 MILLIGRAM(S): 20 TABLET, DELAYED RELEASE ORAL at 07:36

## 2019-04-24 RX ADMIN — ATORVASTATIN CALCIUM 40 MILLIGRAM(S): 80 TABLET, FILM COATED ORAL at 20:55

## 2019-04-24 RX ADMIN — Medication 650 MILLIGRAM(S): at 06:11

## 2019-04-24 RX ADMIN — Medication 400 UNIT(S): at 13:57

## 2019-04-24 NOTE — ED ADULT NURSE REASSESSMENT NOTE - NS ED NURSE REASSESS COMMENT FT1
Patient is a&ox4, able to converse appropriately. Patient receiving 1 unit of PRBC as per MD order. Consent in paper chart, #18g secured in left AC. Patient has no s/s of transfusion reaction at this time. Patient noted to have Stage I pressure ulcer on initial assessment of patient. Allevyn Border protection placed on sacrum, patient turned to left side. Patient denies any concerns at this time, VS WNL. Will continue to monitor patient, awaiting bed assignment.
Patient completed blood transfusion at 1200, VS retaken at 1215. WNL. Patient denies any concerns at this time, no s/s of transfusion reaction. Patient awaiting bed assignment. Patient given daily medications, will continue to monitor patient.

## 2019-04-24 NOTE — H&P ADULT - ASSESSMENT
Pt is a 89 yo M w/ hx CLL, PUD recent EGD, p/w anemia from NH. neg OB (4/19) 8.6/26.6-->7.2/23 (4/23)

## 2019-04-24 NOTE — H&P ADULT - NSHPPHYSICALEXAM_GEN_ALL_CORE
Vital Signs Last 24 Hrs  T(C): 36.1 (24 Apr 2019 10:15), Max: 36.7 (24 Apr 2019 02:18)  T(F): 97 (24 Apr 2019 10:15), Max: 98 (24 Apr 2019 02:18)  HR: 97 (24 Apr 2019 10:15) (72 - 97)  BP: 115/51 (24 Apr 2019 10:15) (111/56 - 115/54)  BP(mean): --  RR: 18 (24 Apr 2019 10:15) (16 - 18)  SpO2: 100% (24 Apr 2019 10:15) (99% - 100%)    PHYSICAL EXAM:  GENERAL: NAD, well-developed  HEAD:  Atraumatic, Normocephalic  EYES: EOMI, PERRLA, conjunctiva and sclera clear  NECK: Supple, No JVD  CHEST/LUNG: Clear to auscultation bilaterally; No wheeze  HEART: Regular rate and rhythm; No murmurs, rubs, or gallops  ABDOMEN: Soft, Nontender, Nondistended; Bowel sounds present  EXTREMITIES:  2+ Peripheral Pulses, No clubbing, cyanosis, or edema  PSYCH: AAOx3  NEUROLOGY: non-focal  SKIN: No rashes or lesions

## 2019-04-24 NOTE — H&P ADULT - NSHPREVIEWOFSYSTEMS_GEN_ALL_CORE
Review of Systems:   CONSTITUTIONAL: No fever, weight loss, or fatigue  EYES: No eye pain, visual disturbances, or discharge  ENMT:  No difficulty hearing, tinnitus, vertigo; No sinus or throat pain  NECK: No pain or stiffness  BREASTS: No pain, masses, or nipple discharge  RESPIRATORY: No cough, wheezing, chills or hemoptysis; No shortness of breath  CARDIOVASCULAR: No chest pain, palpitations, dizziness, or leg swelling  GASTROINTESTINAL: No abdominal or epigastric pain. No nausea, vomiting, or hematemesis; No diarrhea or constipation. No melena or hematochezia.  GENITOURINARY: No dysuria, frequency, hematuria, or incontinence  NEUROLOGICAL: No headaches, memory loss, loss of strength, numbness, or tremors  SKIN: No itching, burning, rashes, or lesions   LYMPH NODES: No enlarged glands  ENDOCRINE: No heat or cold intolerance; No hair loss  MUSCULOSKELETAL: No joint pain or swelling; No muscle, back, or extremity pain  PSYCHIATRIC: No depression, anxiety, mood swings, or difficulty sleeping  HEME/LYMPH: No easy bruising, or bleeding gums  ALLERGY AND IMMUNOLOGIC: No hives or eczema

## 2019-04-24 NOTE — ED ADULT NURSE NOTE - CHIEF COMPLAINT QUOTE
Pt arrives to ED from [facility] due to abnormal lab results of low H&H 7.2 and 23.  Pt unaware of any bleeding.  Pt denies any symptoms.  Pt states he feels 'well."  Pt is staying at facility to rehab his left shoulder and arm.  Lab results were received 4/23/19 at 15:46.  Pt fall risk.

## 2019-04-24 NOTE — ED ADULT TRIAGE NOTE - CHIEF COMPLAINT QUOTE
Pt arrives to ED from [facility] due to abnormal lab results of low H&H 7.2 and 23.  Pt unaware of any bleeding.  Pt denies any symptoms.  Pt states he feels 'well."  Pt is staying at facility to rehab his left shoulder and arm.  Lab results were received 4/23/19 at 15:46.  Pt fall risk.

## 2019-04-24 NOTE — DISCHARGE NOTE PROVIDER - HOSPITAL COURSE
89 yo M w/ hx CLL, PUD recent EGD, p/w anemia from NH. FOBT negative, Hemoglobin improved from 7.7 to 8.7 after 1 U PRBC. Repeat CBC now 10. No evidence of bleeding or hemolysis. Course c/b acute left ankle gout flare. Started on colchicine. 89 yo M w/ hx CLL, PUD recent EGD, p/w anemia from NH. FOBT negative, Hemoglobin improved from 7.7 to 8.7 after 1 U PRBC. Repeat CBC now 10. No evidence of bleeding or hemolysis. Course c/b acute left ankle gout flare. Started on colchicine. PT evaluated patient and recommended rehab, dc to rehab on 4/26.

## 2019-04-24 NOTE — ED ADULT NURSE NOTE - NSIMPLEMENTINTERV_GEN_ALL_ED
Implemented All Fall with Harm Risk Interventions:  [place] to call system. Call bell, personal items and telephone within reach. Instruct patient to call for assistance. Room bathroom lighting operational. Non-slip footwear when patient is off stretcher. Physically safe environment: no spills, clutter or unnecessary equipment. Stretcher in lowest position, wheels locked, appropriate side rails in place. Provide visual cue, wrist band, yellow gown, etc. Monitor gait and stability. Monitor for mental status changes and reorient to person, place, and time. Review medications for side effects contributing to fall risk. Reinforce activity limits and safety measures with patient and family. Provide visual clues: red socks.

## 2019-04-24 NOTE — DISCHARGE NOTE PROVIDER - NSDCCPCAREPLAN_GEN_ALL_CORE_FT
PRINCIPAL DISCHARGE DIAGNOSIS  Diagnosis: Anemia  Assessment and Plan of Treatment: received 1uprbc in ED. Continue medications. Follow up with your PCP for further evaluation and management. Please call to make an appointment within 1-2 weeks of discharge. PRINCIPAL DISCHARGE DIAGNOSIS  Diagnosis: Anemia  Assessment and Plan of Treatment: received 1uprbc in ED with improvement from Hgb 7.7 to 10. FOBT was negative, labs negative for hemolysis. Patient to followup with Dr. Brandy Oliveira (hematologist) to start Imbruvica. Follow up with your PCP for further evaluation and management. Please call to make an appointment within 1-2 weeks of discharge.      SECONDARY DISCHARGE DIAGNOSES  Diagnosis: Acute gout  Assessment and Plan of Treatment: left ankle swelling and TTP with erythema. C/w colchicine PRINCIPAL DISCHARGE DIAGNOSIS  Diagnosis: Anemia  Assessment and Plan of Treatment: received 1uprbc in ED with improvement from Hgb 7.7 to 10. FOBT was negative, labs negative for hemolysis. Patient to followup with Dr. Brandy Oliveira (hematologist) to start Imbruvica. You have an appt on 6/12/19 at 11:30 am with Dr. Oliveira at [address] [place] [address]  Follow up with your PCP for further evaluation and management. Please call to make an appointment within 1-2 weeks of discharge.      SECONDARY DISCHARGE DIAGNOSES  Diagnosis: Acute gout  Assessment and Plan of Treatment: left ankle swelling and TTP with erythema. C/w colchicine

## 2019-04-24 NOTE — H&P ADULT - PROBLEM SELECTOR PLAN 1
-likely secondary to worsening CLL as per records on allscripts was to start therapy as outpt  -transfuse 1 uprbc   -monitor x 24 hrs repeat OB if no overt bleeding can transfer back to rehab with outpt f/u with -likely secondary to worsening CLL as per records on allscripts was to start therapy as outpt with Dr. Oliveira  -transfuse 1 uprbc   -monitor x 24 hrs repeat OB if no overt bleeding can transfer back to rehab with outpt f/u

## 2019-04-24 NOTE — H&P ADULT - NSHPLABSRESULTS_GEN_ALL_CORE
7.7    5.89  )-----------( 101      ( 24 Apr 2019 03:42 )             24.4       04-24    141  |  109<H>  |  28<H>  ----------------------------<  100<H>  3.6   |  20<L>  |  1.15    Ca    7.8<L>      24 Apr 2019 03:42    TPro  5.1<L>  /  Alb  2.5<L>  /  TBili  0.7  /  DBili  x   /  AST  34  /  ALT  48<H>  /  AlkPhos  52  04-24      CAPILLARY BLOOD GLUCOSE                  Radiology

## 2019-04-24 NOTE — H&P ADULT - NSICDXPASTMEDICALHX_GEN_ALL_CORE_FT
PAST MEDICAL HISTORY:  CAD (coronary artery disease)     HTN (hypertension)     PUD (peptic ulcer disease)     Upper GI bleed PAST MEDICAL HISTORY:  CAD (coronary artery disease)     CLL (chronic lymphocytic leukemia)     H/O radiculopathy     HTN (hypertension)     PUD (peptic ulcer disease)     Upper GI bleed

## 2019-04-24 NOTE — H&P ADULT - NSHPSOCIALHISTORY_GEN_ALL_CORE
currently resides at [facility] currently resides at [facility]; single; denies Tob/ occcasionally drink socially; retired

## 2019-04-24 NOTE — ED PROVIDER NOTE - OBJECTIVE STATEMENT
89M with hx of HTN, CAD, recent UGI Bleed secondary to gastric ulcer with prolong hospital admission to [facility], sent from [facility] for low Hg. patients initial Hg upon admission (4/18) was 8.6, now noted to be 7.2. patient denies any pain or obvious bleeding. all blood thinners held for weeks. stool has been loose and brown. 89M with hx of HTN, CAD, recent UGI Bleed secondary to duodenal ulcer with prolong hospital admission to [facility], sent from [facility] for low Hg. patients initial Hg upon admission (4/18) was 8.6, now noted to be 7.2. patient denies any pain or obvious bleeding. all blood thinners held for weeks. stool has been loose and brown.

## 2019-04-24 NOTE — ED PROVIDER NOTE - ATTENDING CONTRIBUTION TO CARE
89 y/o M with h/o HTN, gout, CAD on ASA, recent hospitalization for upper GIB (duodenal ulcer) requiring transfusion sent from rehab facility for anemia.  Per records, pt was admitted to rehab with Hb 8.6 on 4/16/19.  Routine labs drawn yesterday showing Hb 7.2 and pt transferred to ER for evaluation.  Pt denies any pain, sob, lightheadedness, weakness, syncope, but he does state that he has been mostly bedbound and receiving physical therapy (at baseline prior to admission, pt was ambulatory with a cane).  Reports only feeling cold.  He denies any dark or black stools.  No abd pain, n/v, change in appetite.  Chronically ill appearing, lying comfortably in stretcher, awake and alert, nontoxic.  VSS.  NCAT EOMI PERRL, conjunctival pallor, dry mucosa.  Lungs cta bl.  Cards nl S1/S2, RRR, no MRG.  Abd soft ntnd.  Skin is pale.  Plan for labs, guaiac, poss transfusion, admit given h/o GI hemorrhage.

## 2019-04-24 NOTE — ED ADULT NURSE NOTE - OBJECTIVE STATEMENT
Break coverage RN. Pt is a 89 year old male reporting to the ED From skilled nursing facility. Pt reports coming to the ED for low H&H 7.2 and 23. Pt is AOX4. Pt denies chest pain or SOB. Pt reports "I feel fine". Pt  states feeling colder than normal. Pt respirations even and unlabored, spo2 100 % on room air. Pt appears to be comfortable, in NAD.  PT reports recent hospitalization after a fall at home where he received 2 U of blood. Pt skin is pale, dry. Pt has a scab on right elbow from fall at home. Pt has sacral stage 1 pressure ulcer ~4x4 in. Pt has stage 1 pressure ulcer on coccyx ~2x2. Pt reports left shoulder pain related to tendinitis and left leg pain related to muscle cramping. Pt denies fever, chills, n/v/d. Pt denies dark color stool, bleeding or abdominal pain. 18 g iv placed in left ac, labs drawn, pending review, will continue to monitor.

## 2019-04-24 NOTE — H&P ADULT - HISTORY OF PRESENT ILLNESS
Pt is a 88 yo M w/ hx CLL (seen by Dr. Oliveira 12/18 H/H was 11.3/36 with plans to start Imbruvica due to worsening thrombocytopenia and anemia), cervical radioculopathy with recent steroid injection, colon polyps, PUD with recent admission to [facility] sent from rehab due to abnormal labs. Pt states he had routine labs draw at rehab was noted to have H/H 8.6/26.6-->7.2/23 was sent to ED for further evaluation. Pt denies any black or bloody stool or hematochezia or CP or SOB. Pt is a 89 yo M w/ hx CLL (seen by Brandy Cuenca 12/18 H/H was 11.3/36 with plans to start Imbruvica due to worsening thrombocytopenia and anemia), cervical radioculopathy with recent steroid injection, colon polyps, PUD with recent admission to [facility] sent from rehab due to abnormal labs. Pt states he had routine labs draw at rehab was noted to have H/H 8.6/26.6-->7.2/23 was sent to ED for further evaluation. Pt denies any black or bloody stool or hematochezia or CP or SOB.

## 2019-04-24 NOTE — ED PROVIDER NOTE - CLINICAL SUMMARY MEDICAL DECISION MAKING FREE TEXT BOX
89M with recent upper gi bleed p/w worsening anemia. asymptomatic. r/o recurrent bleed. check labs, stool guiac

## 2019-04-25 DIAGNOSIS — M10.9 GOUT, UNSPECIFIED: ICD-10-CM

## 2019-04-25 LAB
ALBUMIN SERPL ELPH-MCNC: 2.7 G/DL — LOW (ref 3.3–5)
ALP SERPL-CCNC: 65 U/L — SIGNIFICANT CHANGE UP (ref 40–120)
ALT FLD-CCNC: 45 U/L — HIGH (ref 4–41)
ANION GAP SERPL CALC-SCNC: 13 MMO/L — SIGNIFICANT CHANGE UP (ref 7–14)
AST SERPL-CCNC: 33 U/L — SIGNIFICANT CHANGE UP (ref 4–40)
BILIRUB SERPL-MCNC: 1.2 MG/DL — SIGNIFICANT CHANGE UP (ref 0.2–1.2)
BUN SERPL-MCNC: 20 MG/DL — SIGNIFICANT CHANGE UP (ref 7–23)
CALCIUM SERPL-MCNC: 8.2 MG/DL — LOW (ref 8.4–10.5)
CHLORIDE SERPL-SCNC: 107 MMOL/L — SIGNIFICANT CHANGE UP (ref 98–107)
CO2 SERPL-SCNC: 22 MMOL/L — SIGNIFICANT CHANGE UP (ref 22–31)
CREAT SERPL-MCNC: 0.94 MG/DL — SIGNIFICANT CHANGE UP (ref 0.5–1.3)
FERRITIN SERPL-MCNC: 762.9 NG/ML — HIGH (ref 30–400)
FOLATE SERPL-MCNC: > 20 NG/ML — HIGH (ref 4.7–20)
GLUCOSE SERPL-MCNC: 92 MG/DL — SIGNIFICANT CHANGE UP (ref 70–99)
HAPTOGLOB SERPL-MCNC: 294 MG/DL — HIGH (ref 34–200)
HCT VFR BLD CALC: 27.2 % — LOW (ref 39–50)
HCT VFR BLD CALC: 31.9 % — LOW (ref 39–50)
HGB BLD-MCNC: 10 G/DL — LOW (ref 13–17)
HGB BLD-MCNC: 8.7 G/DL — LOW (ref 13–17)
INR BLD: 1.3 — HIGH (ref 0.88–1.17)
IRON SATN MFR SERPL: 176 UG/DL — SIGNIFICANT CHANGE UP (ref 155–535)
IRON SATN MFR SERPL: 18 UG/DL — LOW (ref 45–165)
LDH SERPL L TO P-CCNC: 250 U/L — HIGH (ref 135–225)
MCHC RBC-ENTMCNC: 29.9 PG — SIGNIFICANT CHANGE UP (ref 27–34)
MCHC RBC-ENTMCNC: 30 PG — SIGNIFICANT CHANGE UP (ref 27–34)
MCHC RBC-ENTMCNC: 31.3 % — LOW (ref 32–36)
MCHC RBC-ENTMCNC: 32 % — SIGNIFICANT CHANGE UP (ref 32–36)
MCV RBC AUTO: 93.5 FL — SIGNIFICANT CHANGE UP (ref 80–100)
MCV RBC AUTO: 95.8 FL — SIGNIFICANT CHANGE UP (ref 80–100)
NRBC # FLD: 0 K/UL — SIGNIFICANT CHANGE UP (ref 0–0)
PLATELET # BLD AUTO: 92 K/UL — LOW (ref 150–400)
PLATELET # BLD AUTO: 98 K/UL — LOW (ref 150–400)
PMV BLD: 10.5 FL — SIGNIFICANT CHANGE UP (ref 7–13)
PMV BLD: 10.5 FL — SIGNIFICANT CHANGE UP (ref 7–13)
POTASSIUM SERPL-MCNC: 3.7 MMOL/L — SIGNIFICANT CHANGE UP (ref 3.5–5.3)
POTASSIUM SERPL-SCNC: 3.7 MMOL/L — SIGNIFICANT CHANGE UP (ref 3.5–5.3)
PROT SERPL-MCNC: 5.6 G/DL — LOW (ref 6–8.3)
PROTHROM AB SERPL-ACNC: 14.5 SEC — HIGH (ref 9.8–13.1)
RBC # BLD: 2.91 M/UL — LOW (ref 4.2–5.8)
RBC # BLD: 3.33 M/UL — LOW (ref 4.2–5.8)
RBC # FLD: 15.3 % — HIGH (ref 10.3–14.5)
RBC # FLD: 15.5 % — HIGH (ref 10.3–14.5)
RETICS #: 35 K/UL — SIGNIFICANT CHANGE UP (ref 25–125)
RETICS/RBC NFR: 1.1 % — SIGNIFICANT CHANGE UP (ref 0.5–2.5)
SODIUM SERPL-SCNC: 142 MMOL/L — SIGNIFICANT CHANGE UP (ref 135–145)
UIBC SERPL-MCNC: 157.8 UG/DL — SIGNIFICANT CHANGE UP (ref 110–370)
VIT B12 SERPL-MCNC: 1757 PG/ML — HIGH (ref 200–900)
WBC # BLD: 6.1 K/UL — SIGNIFICANT CHANGE UP (ref 3.8–10.5)
WBC # BLD: 7.16 K/UL — SIGNIFICANT CHANGE UP (ref 3.8–10.5)
WBC # FLD AUTO: 6.1 K/UL — SIGNIFICANT CHANGE UP (ref 3.8–10.5)
WBC # FLD AUTO: 7.16 K/UL — SIGNIFICANT CHANGE UP (ref 3.8–10.5)

## 2019-04-25 PROCEDURE — 99233 SBSQ HOSP IP/OBS HIGH 50: CPT

## 2019-04-25 RX ORDER — COLCHICINE 0.6 MG
1.2 TABLET ORAL ONCE
Qty: 0 | Refills: 0 | Status: COMPLETED | OUTPATIENT
Start: 2019-04-25 | End: 2019-04-25

## 2019-04-25 RX ORDER — COLCHICINE 0.6 MG
1 TABLET ORAL
Qty: 0 | Refills: 0 | DISCHARGE
Start: 2019-04-25

## 2019-04-25 RX ORDER — COLCHICINE 0.6 MG
0.6 TABLET ORAL DAILY
Qty: 0 | Refills: 0 | Status: DISCONTINUED | OUTPATIENT
Start: 2019-04-25 | End: 2019-04-26

## 2019-04-25 RX ORDER — ACETAMINOPHEN 500 MG
650 TABLET ORAL ONCE
Qty: 0 | Refills: 0 | Status: COMPLETED | OUTPATIENT
Start: 2019-04-25 | End: 2019-04-25

## 2019-04-25 RX ADMIN — PANTOPRAZOLE SODIUM 40 MILLIGRAM(S): 20 TABLET, DELAYED RELEASE ORAL at 07:00

## 2019-04-25 RX ADMIN — ATORVASTATIN CALCIUM 40 MILLIGRAM(S): 80 TABLET, FILM COATED ORAL at 22:28

## 2019-04-25 RX ADMIN — Medication 0.6 MILLIGRAM(S): at 14:53

## 2019-04-25 RX ADMIN — Medication 1 TABLET(S): at 12:43

## 2019-04-25 RX ADMIN — Medication 650 MILLIGRAM(S): at 23:45

## 2019-04-25 RX ADMIN — Medication 400 UNIT(S): at 12:43

## 2019-04-25 RX ADMIN — Medication 1.2 MILLIGRAM(S): at 14:53

## 2019-04-25 NOTE — DIETITIAN INITIAL EVALUATION ADULT. - PROBLEM SELECTOR PLAN 1
-likely secondary to worsening CLL as per records on allscripts was to start therapy as outpt with Dr. Oliveira  -transfuse 1 uprbc   -monitor x 24 hrs repeat OB if no overt bleeding can transfer back to rehab with outpt f/u

## 2019-04-25 NOTE — DIETITIAN INITIAL EVALUATION ADULT. - NUTRITION INTERVENTIONS
1. Suggest order 1 Packet No Carb. Pro Source per day.  2. Suggest order 2 Ensure Enlive supplements per day.  3. Monitor weight, labs, po intake and skin integrity.

## 2019-04-25 NOTE — PHYSICAL THERAPY INITIAL EVALUATION ADULT - ADDITIONAL COMMENTS
Patient lives alone in an apartment with elevator to enter; no Assistive Device prior to admission. Patient from rehab where he was ambulating with rolling walker

## 2019-04-25 NOTE — PROGRESS NOTE ADULT - PROBLEM SELECTOR PLAN 2
-likely secondary to worsening CLL as per records on allscripts was to start therapy as outpt with Dr. Oliveira  H/H improved to 8.7 and now 10 after 1 U PRBC, no evidence of hemolysis or bleeding. FOBT negative. Iron panel c/w AOCD  Attempted to call Dr. Oliveira and message left with  for callback

## 2019-04-25 NOTE — PHYSICAL THERAPY INITIAL EVALUATION ADULT - PERTINENT HX OF CURRENT PROBLEM, REHAB EVAL
88 yo M w/ hx CLL, cervical radioculopathy with recent steroid injection, colon polyps, PUD with recent admission to [facility] sent from rehab due to abnormal labs.

## 2019-04-25 NOTE — PROGRESS NOTE ADULT - SUBJECTIVE AND OBJECTIVE BOX
Patient is a 89y old  Male who presents with a chief complaint of anemia (24 Apr 2019 16:00)      SUBJECTIVE / OVERNIGHT EVENTS: No acute events overnight. Denies dizziness, chest pain, SOB, bleeding.    MEDICATIONS  (STANDING):  atorvastatin 40 milliGRAM(s) Oral at bedtime  cholecalciferol 400 Unit(s) Oral daily  colchicine 1.2 milliGRAM(s) Oral once  colchicine 0.6 milliGRAM(s) Oral daily  multivitamin 1 Tablet(s) Oral daily  pantoprazole    Tablet 40 milliGRAM(s) Oral before breakfast    MEDICATIONS  (PRN):  guaiFENesin    Syrup 200 milliGRAM(s) Oral every 6 hours PRN Cough      T(C): 36.6 (04-25-19 @ 05:47), Max: 36.9 (04-24-19 @ 21:09)  HR: 77 (04-25-19 @ 05:47) (67 - 79)  BP: 115/72 (04-25-19 @ 05:47) (96/58 - 138/78)  RR: 15 (04-25-19 @ 05:47) (15 - 18)  SpO2: 100% (04-25-19 @ 05:47) (100% - 100%)  CAPILLARY BLOOD GLUCOSE    I&O's Summary    PHYSICAL EXAM:  GENERAL: no apparent distress, on room air  EYES: sclera clear b/l  CHEST/LUNG: Clear to auscultation bilaterally; No wheezing or crackles  HEART: s1/s2, no murmurs  ABDOMEN: Soft, Nontender, Nondistended; Bowel sounds present  EXTREMITIES:  WWP  MSK: left lateral malleolus erythematous, warm, swollen, and TTP  NEUROLOGY: awake, alert, responds to Qs appropriately    LABS:                        10.0   7.16  )-----------( 98       ( 25 Apr 2019 11:48 )             31.9     04-25    142  |  107  |  20  ----------------------------<  92  3.7   |  22  |  0.94    Ca    8.2<L>      25 Apr 2019 11:48    TPro  5.6<L>  /  Alb  2.7<L>  /  TBili  1.2  /  DBili  x   /  AST  33  /  ALT  45<H>  /  AlkPhos  65  04-25    PT/INR - ( 25 Apr 2019 11:48 )   PT: 14.5 SEC;   INR: 1.30                    RADIOLOGY & ADDITIONAL TESTS:

## 2019-04-25 NOTE — PROGRESS NOTE ADULT - PROBLEM SELECTOR PLAN 1
Per patient has hx of gout  Left ankle TTP, erythema, swelling.   Colchicine started, apply cold compress to left ankle

## 2019-04-25 NOTE — DIETITIAN INITIAL EVALUATION ADULT. - OTHER INFO
Pt states that he was hospitalized in another facility and did not eat for 4 weeks while there. Pt was consuming Ensure supplements only. Pt was eating less than 50% of his normal intake. He states his weight was 135 lbs when he was first admitted and now weighs 122 lbs. That was a 9.62% decrease in weight. He also has signs of severe muscle wasting with protruding clavicle. Pt also has a stage 2 pressure injury on his coccyx. Suggest 1 packet of No Carb Pro Source to help it heal. Also suggest Ensure Enlive 2x/day to help increase Pt's po intake. Pt willing to try them.

## 2019-04-25 NOTE — PROGRESS NOTE ADULT - ASSESSMENT
Pt is a 89 yo M w/ hx CLL, PUD recent EGD, p/w anemia from NH. neg OB (4/19) 8.6/26.6-->7.2/23 (4/23). C/b acute left ankle gout flare

## 2019-04-26 ENCOUNTER — TRANSCRIPTION ENCOUNTER (OUTPATIENT)
Age: 84
End: 2019-04-26

## 2019-04-26 VITALS
SYSTOLIC BLOOD PRESSURE: 94 MMHG | HEART RATE: 78 BPM | TEMPERATURE: 98 F | RESPIRATION RATE: 17 BRPM | DIASTOLIC BLOOD PRESSURE: 56 MMHG | OXYGEN SATURATION: 99 %

## 2019-04-26 PROCEDURE — 99239 HOSP IP/OBS DSCHRG MGMT >30: CPT

## 2019-04-26 RX ORDER — TRAMADOL HYDROCHLORIDE 50 MG/1
25 TABLET ORAL ONCE
Qty: 0 | Refills: 0 | Status: DISCONTINUED | OUTPATIENT
Start: 2019-04-26 | End: 2019-04-26

## 2019-04-26 RX ADMIN — Medication 1 TABLET(S): at 11:53

## 2019-04-26 RX ADMIN — Medication 650 MILLIGRAM(S): at 00:30

## 2019-04-26 RX ADMIN — Medication 0.6 MILLIGRAM(S): at 11:53

## 2019-04-26 RX ADMIN — Medication 400 UNIT(S): at 11:53

## 2019-04-26 RX ADMIN — PANTOPRAZOLE SODIUM 40 MILLIGRAM(S): 20 TABLET, DELAYED RELEASE ORAL at 06:22

## 2019-04-26 RX ADMIN — TRAMADOL HYDROCHLORIDE 25 MILLIGRAM(S): 50 TABLET ORAL at 02:28

## 2019-04-26 NOTE — PROGRESS NOTE ADULT - PROBLEM SELECTOR PLAN 2
-likely secondary to worsening CLL as per records on allscripts was to start therapy as outpt with Dr. Oliveira  H/H improved to 8.7 and now 10 after 1 U PRBC, no evidence of hemolysis or bleeding. FOBT negative. Iron panel c/w AOCD  Attempted to call Dr. Oliveira and message left with  for callback. Has appt with Dr. Oliveira 6/12/19 at 11:30am

## 2019-04-26 NOTE — PROGRESS NOTE ADULT - PROBLEM SELECTOR PLAN 1
Per patient has hx of gout  Left ankle TTP, erythema, swelling: improving  Colchicine started, apply cold compress to left ankle  PT recs: rehab

## 2019-04-26 NOTE — PROGRESS NOTE ADULT - SUBJECTIVE AND OBJECTIVE BOX
Patient is a 89y old  Male who presents with a chief complaint of anemia (25 Apr 2019 13:48)      SUBJECTIVE / OVERNIGHT EVENTS: Left ankle pain improved. Denies chest pain, SOB.    MEDICATIONS  (STANDING):  atorvastatin 40 milliGRAM(s) Oral at bedtime  cholecalciferol 400 Unit(s) Oral daily  colchicine 0.6 milliGRAM(s) Oral daily  multivitamin 1 Tablet(s) Oral daily  pantoprazole    Tablet 40 milliGRAM(s) Oral before breakfast    MEDICATIONS  (PRN):  guaiFENesin    Syrup 200 milliGRAM(s) Oral every 6 hours PRN Cough    T(C): 36.9 (04-26-19 @ 06:17), Max: 37.6 (04-25-19 @ 21:49)  HR: 78 (04-26-19 @ 06:17) (69 - 78)  BP: 94/56 (04-26-19 @ 06:17) (94/56 - 122/65)  RR: 17 (04-26-19 @ 06:17) (16 - 17)  SpO2: 99% (04-26-19 @ 06:17) (96% - 99%)  CAPILLARY BLOOD GLUCOSE    I&O's Summary    PHYSICAL EXAM:  GENERAL: no apparent distress, on room air  EYES: sclera clear b/l  CHEST/LUNG: Clear to auscultation bilaterally; No wheezing or crackles  HEART: s1/s2, no murmurs  ABDOMEN: Soft, Nontender, Nondistended; Bowel sounds present  EXTREMITIES:  WWP  MSK: left lateral malleolus less tender and swelling present but improving  NEUROLOGY: awake, alert, responds to Qs appropriately    LABS:                        10.0   7.16  )-----------( 98       ( 25 Apr 2019 11:48 )             31.9     04-25    142  |  107  |  20  ----------------------------<  92  3.7   |  22  |  0.94    Ca    8.2<L>      25 Apr 2019 11:48    TPro  5.6<L>  /  Alb  2.7<L>  /  TBili  1.2  /  DBili  x   /  AST  33  /  ALT  45<H>  /  AlkPhos  65  04-25    PT/INR - ( 25 Apr 2019 11:48 )   PT: 14.5 SEC;   INR: 1.30       RADIOLOGY & ADDITIONAL TESTS:

## 2019-04-26 NOTE — DISCHARGE NOTE NURSING/CASE MANAGEMENT/SOCIAL WORK - NSDCDPATPORTLINK_GEN_ALL_CORE
You can access the [employer] [facility] Portal, offered by [facility], by registering with the following website: [email or web address]

## 2019-05-22 ENCOUNTER — TRANSCRIPTION ENCOUNTER (OUTPATIENT)
Age: 84
End: 2019-05-22

## 2019-05-22 PROBLEM — Z86.69 PERSONAL HISTORY OF OTHER DISEASES OF THE NERVOUS SYSTEM AND SENSE ORGANS: Chronic | Status: ACTIVE | Noted: 2019-04-24

## 2019-05-22 PROBLEM — K92.2 GASTROINTESTINAL HEMORRHAGE, UNSPECIFIED: Chronic | Status: ACTIVE | Noted: 2019-04-24

## 2019-05-22 PROBLEM — C91.90 LYMPHOID LEUKEMIA, UNSPECIFIED NOT HAVING ACHIEVED REMISSION: Chronic | Status: ACTIVE | Noted: 2019-04-24

## 2019-05-22 PROBLEM — I10 ESSENTIAL (PRIMARY) HYPERTENSION: Chronic | Status: ACTIVE | Noted: 2019-04-24

## 2019-05-22 PROBLEM — K27.9 PEPTIC ULCER, SITE UNSPECIFIED, UNSPECIFIED AS ACUTE OR CHRONIC, WITHOUT HEMORRHAGE OR PERFORATION: Chronic | Status: ACTIVE | Noted: 2019-04-24

## 2019-05-22 PROBLEM — I25.10 ATHEROSCLEROTIC HEART DISEASE OF NATIVE CORONARY ARTERY WITHOUT ANGINA PECTORIS: Chronic | Status: ACTIVE | Noted: 2019-04-24

## 2019-05-24 ENCOUNTER — INPATIENT (INPATIENT)
Facility: HOSPITAL | Age: 84
LOS: 2 days | Discharge: HOME HEALTH | DRG: 299 | End: 2019-05-27
Attending: INTERNAL MEDICINE | Admitting: INTERNAL MEDICINE
Payer: MEDICARE

## 2019-05-24 ENCOUNTER — APPOINTMENT (OUTPATIENT)
Dept: HEART AND VASCULAR | Facility: CLINIC | Age: 84
End: 2019-05-24
Payer: MEDICARE

## 2019-05-24 ENCOUNTER — APPOINTMENT (OUTPATIENT)
Dept: VASCULAR SURGERY | Facility: CLINIC | Age: 84
End: 2019-05-24
Payer: MEDICARE

## 2019-05-24 ENCOUNTER — LABORATORY RESULT (OUTPATIENT)
Age: 84
End: 2019-05-24

## 2019-05-24 VITALS
HEIGHT: 67 IN | RESPIRATION RATE: 14 BRPM | OXYGEN SATURATION: 100 % | BODY MASS INDEX: 21.51 KG/M2 | SYSTOLIC BLOOD PRESSURE: 122 MMHG | TEMPERATURE: 97.7 F | HEART RATE: 82 BPM | WEIGHT: 137.01 LBS | DIASTOLIC BLOOD PRESSURE: 66 MMHG

## 2019-05-24 VITALS
OXYGEN SATURATION: 99 % | WEIGHT: 137.79 LBS | DIASTOLIC BLOOD PRESSURE: 82 MMHG | HEART RATE: 87 BPM | TEMPERATURE: 98 F | SYSTOLIC BLOOD PRESSURE: 138 MMHG | RESPIRATION RATE: 18 BRPM

## 2019-05-24 DIAGNOSIS — D64.9 ANEMIA, UNSPECIFIED: ICD-10-CM

## 2019-05-24 DIAGNOSIS — I10 ESSENTIAL (PRIMARY) HYPERTENSION: ICD-10-CM

## 2019-05-24 DIAGNOSIS — R63.8 OTHER SYMPTOMS AND SIGNS CONCERNING FOOD AND FLUID INTAKE: ICD-10-CM

## 2019-05-24 DIAGNOSIS — C91.90 LYMPHOID LEUKEMIA, UNSPECIFIED NOT HAVING ACHIEVED REMISSION: ICD-10-CM

## 2019-05-24 DIAGNOSIS — R60.0 LOCALIZED EDEMA: ICD-10-CM

## 2019-05-24 DIAGNOSIS — M11.279: ICD-10-CM

## 2019-05-24 DIAGNOSIS — K27.9 PEPTIC ULCER, SITE UNSPECIFIED, UNSPECIFIED AS ACUTE OR CHRONIC, WITHOUT HEMORRHAGE OR PERFORATION: ICD-10-CM

## 2019-05-24 DIAGNOSIS — I82.403 ACUTE EMBOLISM AND THROMBOSIS OF UNSPECIFIED DEEP VEINS OF LOWER EXTREMITY, BILATERAL: ICD-10-CM

## 2019-05-24 DIAGNOSIS — Z91.89 OTHER SPECIFIED PERSONAL RISK FACTORS, NOT ELSEWHERE CLASSIFIED: ICD-10-CM

## 2019-05-24 DIAGNOSIS — I25.10 ATHEROSCLEROTIC HEART DISEASE OF NATIVE CORONARY ARTERY WITHOUT ANGINA PECTORIS: ICD-10-CM

## 2019-05-24 DIAGNOSIS — Z29.9 ENCOUNTER FOR PROPHYLACTIC MEASURES, UNSPECIFIED: ICD-10-CM

## 2019-05-24 LAB
ALBUMIN SERPL ELPH-MCNC: 3.2 G/DL — LOW (ref 3.3–5)
ALP SERPL-CCNC: 68 U/L — SIGNIFICANT CHANGE UP (ref 40–120)
ALT FLD-CCNC: 32 U/L — SIGNIFICANT CHANGE UP (ref 10–45)
ANION GAP SERPL CALC-SCNC: 13 MMOL/L — SIGNIFICANT CHANGE UP (ref 5–17)
APTT BLD: 33.2 SEC — SIGNIFICANT CHANGE UP (ref 27.5–36.3)
AST SERPL-CCNC: 25 U/L — SIGNIFICANT CHANGE UP (ref 10–40)
BASOPHILS # BLD AUTO: 0.02 K/UL — SIGNIFICANT CHANGE UP (ref 0–0.2)
BASOPHILS NFR BLD AUTO: 0.3 % — SIGNIFICANT CHANGE UP (ref 0–2)
BILIRUB SERPL-MCNC: 0.4 MG/DL — SIGNIFICANT CHANGE UP (ref 0.2–1.2)
BLD GP AB SCN SERPL QL: NEGATIVE — SIGNIFICANT CHANGE UP
BUN SERPL-MCNC: 27 MG/DL — HIGH (ref 7–23)
CALCIUM SERPL-MCNC: 8.8 MG/DL — SIGNIFICANT CHANGE UP (ref 8.4–10.5)
CHLORIDE SERPL-SCNC: 107 MMOL/L — SIGNIFICANT CHANGE UP (ref 96–108)
CO2 SERPL-SCNC: 26 MMOL/L — SIGNIFICANT CHANGE UP (ref 22–31)
CREAT SERPL-MCNC: 1.28 MG/DL — SIGNIFICANT CHANGE UP (ref 0.5–1.3)
EOSINOPHIL # BLD AUTO: 0.06 K/UL — SIGNIFICANT CHANGE UP (ref 0–0.5)
EOSINOPHIL NFR BLD AUTO: 0.8 % — SIGNIFICANT CHANGE UP (ref 0–6)
GLUCOSE SERPL-MCNC: 109 MG/DL — HIGH (ref 70–99)
HCT VFR BLD CALC: 23.4 % — LOW (ref 39–50)
HGB BLD-MCNC: 7.1 G/DL — LOW (ref 13–17)
IMM GRANULOCYTES NFR BLD AUTO: 0.4 % — SIGNIFICANT CHANGE UP (ref 0–1.5)
INR BLD: 1.27 — HIGH (ref 0.88–1.16)
LYMPHOCYTES # BLD AUTO: 4.09 K/UL — HIGH (ref 1–3.3)
LYMPHOCYTES # BLD AUTO: 56.9 % — HIGH (ref 13–44)
MCHC RBC-ENTMCNC: 28.5 PG — SIGNIFICANT CHANGE UP (ref 27–34)
MCHC RBC-ENTMCNC: 30.3 GM/DL — LOW (ref 32–36)
MCV RBC AUTO: 94 FL — SIGNIFICANT CHANGE UP (ref 80–100)
MONOCYTES # BLD AUTO: 0.35 K/UL — SIGNIFICANT CHANGE UP (ref 0–0.9)
MONOCYTES NFR BLD AUTO: 4.9 % — SIGNIFICANT CHANGE UP (ref 2–14)
NEUTROPHILS # BLD AUTO: 2.64 K/UL — SIGNIFICANT CHANGE UP (ref 1.8–7.4)
NEUTROPHILS NFR BLD AUTO: 36.7 % — LOW (ref 43–77)
NRBC # BLD: 0 /100 WBCS — SIGNIFICANT CHANGE UP (ref 0–0)
OB PNL STL: NEGATIVE — SIGNIFICANT CHANGE UP
PLATELET # BLD AUTO: 182 K/UL — SIGNIFICANT CHANGE UP (ref 150–400)
POTASSIUM SERPL-MCNC: 4.9 MMOL/L — SIGNIFICANT CHANGE UP (ref 3.5–5.3)
POTASSIUM SERPL-SCNC: 4.9 MMOL/L — SIGNIFICANT CHANGE UP (ref 3.5–5.3)
PROT SERPL-MCNC: 6.3 G/DL — SIGNIFICANT CHANGE UP (ref 6–8.3)
PROTHROM AB SERPL-ACNC: 14.5 SEC — HIGH (ref 10–12.9)
RBC # BLD: 2.49 M/UL — LOW (ref 4.2–5.8)
RBC # FLD: 15.8 % — HIGH (ref 10.3–14.5)
RH IG SCN BLD-IMP: NEGATIVE — SIGNIFICANT CHANGE UP
SODIUM SERPL-SCNC: 146 MMOL/L — HIGH (ref 135–145)
WBC # BLD: 7.19 K/UL — SIGNIFICANT CHANGE UP (ref 3.8–10.5)
WBC # FLD AUTO: 7.19 K/UL — SIGNIFICANT CHANGE UP (ref 3.8–10.5)

## 2019-05-24 PROCEDURE — 99223 1ST HOSP IP/OBS HIGH 75: CPT | Mod: GC

## 2019-05-24 PROCEDURE — 99285 EMERGENCY DEPT VISIT HI MDM: CPT

## 2019-05-24 PROCEDURE — 93970 EXTREMITY STUDY: CPT

## 2019-05-24 PROCEDURE — 36415 COLL VENOUS BLD VENIPUNCTURE: CPT

## 2019-05-24 PROCEDURE — 93000 ELECTROCARDIOGRAM COMPLETE: CPT

## 2019-05-24 PROCEDURE — 99215 OFFICE O/P EST HI 40 MIN: CPT

## 2019-05-24 RX ORDER — OMEPRAZOLE 20 MG/1
20 CAPSULE, DELAYED RELEASE ORAL
Qty: 30 | Refills: 1 | Status: DISCONTINUED | COMMUNITY
Start: 2018-08-08 | End: 2019-05-24

## 2019-05-24 RX ORDER — ISOSORBIDE MONONITRATE 60 MG/1
60 TABLET, EXTENDED RELEASE ORAL
Qty: 90 | Refills: 1 | Status: DISCONTINUED | COMMUNITY
End: 2019-05-24

## 2019-05-24 RX ORDER — GABAPENTIN 300 MG/1
300 CAPSULE ORAL
Refills: 0 | Status: DISCONTINUED | COMMUNITY
End: 2019-05-24

## 2019-05-24 RX ORDER — PANTOPRAZOLE SODIUM 20 MG/1
40 TABLET, DELAYED RELEASE ORAL
Refills: 0 | Status: DISCONTINUED | OUTPATIENT
Start: 2019-05-24 | End: 2019-05-27

## 2019-05-24 RX ORDER — FLUTICASONE PROPIONATE 50 UG/1
50 SPRAY, METERED NASAL
Qty: 48 | Refills: 0 | Status: DISCONTINUED | COMMUNITY
Start: 2017-12-18 | End: 2019-05-24

## 2019-05-24 RX ORDER — ACETAMINOPHEN 500 MG
2 TABLET ORAL
Qty: 0 | Refills: 0 | DISCHARGE

## 2019-05-24 RX ORDER — APIXABAN 2.5 MG/1
10 TABLET, FILM COATED ORAL ONCE
Refills: 0 | Status: COMPLETED | OUTPATIENT
Start: 2019-05-24 | End: 2019-05-24

## 2019-05-24 RX ORDER — CHOLECALCIFEROL (VITAMIN D3) 125 MCG
1 CAPSULE ORAL
Qty: 0 | Refills: 0 | DISCHARGE

## 2019-05-24 RX ORDER — ATORVASTATIN CALCIUM 80 MG/1
1 TABLET, FILM COATED ORAL
Qty: 0 | Refills: 0 | DISCHARGE

## 2019-05-24 RX ORDER — ASPIRIN/CALCIUM CARB/MAGNESIUM 324 MG
1 TABLET ORAL
Qty: 0 | Refills: 0 | DISCHARGE

## 2019-05-24 RX ORDER — APIXABAN 2.5 MG/1
10 TABLET, FILM COATED ORAL EVERY 12 HOURS
Refills: 0 | Status: DISCONTINUED | OUTPATIENT
Start: 2019-05-24 | End: 2019-05-27

## 2019-05-24 RX ORDER — L.ACIDOPH/B.ANIMALIS/B.LONGUM 15B CELL
1 CAPSULE ORAL
Qty: 0 | Refills: 0 | DISCHARGE

## 2019-05-24 RX ORDER — PANTOPRAZOLE SODIUM 20 MG/1
1 TABLET, DELAYED RELEASE ORAL
Qty: 0 | Refills: 0 | DISCHARGE

## 2019-05-24 RX ADMIN — APIXABAN 10 MILLIGRAM(S): 2.5 TABLET, FILM COATED ORAL at 23:16

## 2019-05-24 RX ADMIN — APIXABAN 10 MILLIGRAM(S): 2.5 TABLET, FILM COATED ORAL at 16:53

## 2019-05-24 NOTE — H&P ADULT - HISTORY OF PRESENT ILLNESS
88 yo M w/ hx CLL, PUD , gout, CAD, s/p 2 stents(last in 2010 ) p/w bilateral LE dvt?(diagnosed on vascular study )cervical radiculopathy , colon polyps.  Pt was recently admitted at [facility] in march and subsequently discharged to [facility] on march 27th. 89 yo M w/ hx CLL, PUD , gout, CAD, s/p 2 stents(last in 2010 ) p/w bilateral LE dvt?(diagnosed on vascular study )cervical radiculopathy , colon polyps presenting to ED after vascular study significant for b/l LE dvt .  Pt was recently admitted at [facility] in march and subsequently discharged to [facility] on march 27th. He was sent from [facility] to [facility] a week ago for blood transfusion. Pt discharged from [facility] on May 20th. Since going home he noticed b/l LE swelling , R>L. He denies any recent travel, denies sob, cp, denies dizziness. Pt went to his cardiologist Dr Hodge office who proceeded to do a vascular study on him today which showed bilateral acute on chronic LE DVT.   ED labs showing hg 7.1<10(april 2019 ), wbc 7.19, platelet 182, INR 1.2, PT 14.5, , BUN 27, CR 1.28  ED management : pt started on Eliquis 10 mg bid , transfused 1 unit PRBC.  Pt admitted to [facility] for further management 89 yo M w/ hx CLL, PUD , gout, CAD, s/p 2 stents(last in 2010 ) p/w bilateral LE dvt?(diagnosed on vascular study )cervical radiculopathy , colon polyps presenting to ED after vascular study significant for b/l LE dvt .  Pt was recently admitted at [facility] in march(hx of EGD, noted to have 2 ulcers , s/p clipping of ulcer )  and subsequently discharged to [facility] on march 27th. He was sent from [facility] to [facility] a week ago for blood transfusion. Pt discharged from [facility] on May 20th. Since going home he noticed b/l LE swelling , R>L. He denies any recent travel, denies sob, cp, denies dizziness. Pt went to his cardiologist Dr Hodge office who proceeded to do a vascular study on him today which showed bilateral acute on chronic LE DVT.   ED labs showing hg 7.1<10(april 2019 ), wbc 7.19, platelet 182, INR 1.2, PT 14.5, , BUN 27, CR 1.28  ED management : pt started on Eliquis 10 mg bid , transfused 1 unit PRBC.  Pt admitted to [facility] for further management 88 yo M w/ hx CLL, PUD , gout, CAD, s/p 2 stents(last in 2010 ) p/w bilateral LE dvt?(diagnosed on vascular study )cervical radiculopathy , colon polyps presenting to ED after LE USG significant for b/l LE dvt . Pt noted swelling for past 4 days since being discharged from [facility]. Right leg notably more swollen compared to left LE.  He denies any recent travel, denies sob, cp, denies dizziness. Pt went to his cardiologist Dr Hodge office who proceeded to do an USG on him today which showed bilateral acute on chronic LE DVT. Study scanned in alpha. Study showing  left acute DVT in popliteal  and post tibial veins. Right sided acute DVT in the femoral and popliteal veins. 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   Of note Pt was recently admitted at [facility] in march(hx of EGD, noted to have 2 ulcers , s/p clipping of ulcer )  and subsequently discharged to [facility] on march 27th. He was sent from [facility] to [facility] a week ago for blood transfusion. Pt discharged from [facility] on May 20th. ED labs showing hg 7.1<10(april 2019 ), wbc 7.19, platelet 182, INR 1.2, PT 14.5, , BUN 27, CR 1.28  ED management : pt started on Eliquis 10 mg bid , transfused 1 unit PRBC.  Pt admitted to [facility] for further management

## 2019-05-24 NOTE — HISTORY OF PRESENT ILLNESS
[FreeTextEntry1] : 89 year male who was walking in [place] on March 25, 2019 and developed shoulder pain without obvious cause. When the pain worsened her went to ER a [facility] and was diagnosed with tendonitis. [facility] He received 2 shoulder injections. The pain worsened. He returned to [facility] and was again discharged with same diagnosis. When at home he slid off of his couch trying to get up and was on the floor for 3 hours. He did not have his Life Alert with him. He was helped up by friends. He went to bathroom and fell trying to get up from the toilet and fell. He used Life Alert and was taken by EMS to [facility] and was admitted with weakness and inability to walk. He had a workup and was found to be anemic. Workup included EGD, MRI and was transfused 2 units of blood. He was noted to have a stomach ulcer which was stapled. There was a second small ulcer that did not need treatment. He remained in the hospital until discharged to [facility] [address]. He was then admitted from Rehab to [facility] with anemia and was transfused and returned to the same Rehab. He remained at Rehab until May 20. He was diagnosed with Pseudogout which was thought to be the cause of his leg swelling when at [place]. He was evaluated for trauma to his LEs but was told that it was not the cause. At first his LLE was swollen. His RLE then swelled at Rehab. He does not recall being checked for DVTs at any time.

## 2019-05-24 NOTE — H&P ADULT - ATTENDING COMMENTS
89 year old male patient with CLL is admitted for Right sided acute DVT in the femoral and popliteal veins. Patient had a right sided leg swelling for 4 days, progressing is not associated with pain.  1. DVT  heme consult, patients outpatient doctor is Dr Roxanne handy will start Eliquis in the setting of CLL  2.CLL  normal WBC count   f/u outpatient   3.Anemia  heme recs  2U PRBC transfusion f/u in the AM

## 2019-05-24 NOTE — ED PROVIDER NOTE - CLINICAL SUMMARY MEDICAL DECISION MAKING FREE TEXT BOX
Pt p/w acute DVT, concomitant worsening anemia. Hx CLL, not on current tx. Most recent transfusion 1 month ago. No CP, SOB to suggest PE. Pt reports gen weakness. No palpitations or FISH. Will need AC, but will d/w heme Dr Oliveira. Will transfuse as well. Denies melena or hematochezia. Stool color normal on exam.

## 2019-05-24 NOTE — H&P ADULT - PROBLEM SELECTOR PLAN 1
Pt with B/l lower extremity edema for 4 days  vascular study showing b/l LE acute on chronic DVT  pt with risk factors such as malignancy combined with immobility which could predispose him to developing dvt  denies sob, not hypoxic, no chest pain, little concern for acute PE  As per Dr Oliveira Eliquis recently approved for use in malignancy  will start Eliquis  10 mg bid fo 14 days and then transition to 5 mg bid Pt with B/l lower extremity edema for 4 days  Study scanned in alpha. Study showing  left acute DVT in popliteal  and post tibial veins. Right sided acute DVT in the femoral and popliteal veins. 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   pt with risk factors such as malignancy combined with immobility which could predispose him to developing dvt  denies sob, not hypoxic, no chest pain, little concern for acute PE  As per Dr Oliveira Eliquis recently approved for use in malignancy  will start Eliquis  10 mg bid fo 14 days and then transition to 5 mg bid

## 2019-05-24 NOTE — PHYSICAL EXAM
[Well Groomed] : well groomed [Normal Appearance] : normal appearance [No Deformities] : no deformities [General Appearance - In No Acute Distress] : no acute distress [] : no respiratory distress [Respiration, Rhythm And Depth] : normal respiratory rhythm and effort [Exaggerated Use Of Accessory Muscles For Inspiration] : no accessory muscle use [Auscultation Breath Sounds / Voice Sounds] : lungs were clear to auscultation bilaterally [Heart Sounds] : normal S1 and S2 [FreeTextEntry1] : pre-tibial edema but thigh wasting [Skin Turgor] : normal skin turgor [Oriented To Time, Place, And Person] : oriented to person, place, and time [Mood] : the mood was normal [Affect] : the affect was normal [No Anxiety] : not feeling anxious

## 2019-05-24 NOTE — ED ADULT TRIAGE NOTE - CHIEF COMPLAINT QUOTE
Patient coming in from outpatient US for positive DVT to both legs, patient reports he has swelling to both legs for a week.

## 2019-05-24 NOTE — ED ADULT NURSE NOTE - OBJECTIVE STATEMENT
Pt presents to ED with c/o b/l lower extremity swelling L>R x1 week. Recently d/c from SNF, received blood transfusion yesterday at [facility] for anemia. Sent by PCP for outpt US, dx with b/l DVTs. Reports L hip/upper leg pain, states it is difficult for him to bear weight, bend leg, or move leg. Pt states he recently had a scan done and was told "everything was fine." Denies CP/SOB.

## 2019-05-24 NOTE — ED PROVIDER NOTE - PROGRESS NOTE DETAILS
Vascular doppler performed on b/l LE today, impression: acute occlusive DVT in the R femoral and popliteal veins. Chronic thrombosis in the R peroneal vein. Acute DVT in the L popliteal and posterior tibial veins. Non-occlusive thrombosis in the L popliteal vein D/w Dr Oliviera - admit to hospitalist. Start Eliquis, recently approved in [place] pt. transfuse 2 units

## 2019-05-24 NOTE — ED ADULT NURSE NOTE - NSIMPLEMENTINTERV_GEN_ALL_ED
Implemented All Fall with Harm Risk Interventions:  [place] to call system. Call bell, personal items and telephone within reach. Instruct patient to call for assistance. Room bathroom lighting operational. Non-slip footwear when patient is off stretcher. Physically safe environment: no spills, clutter or unnecessary equipment. Stretcher in lowest position, wheels locked, appropriate side rails in place. Provide visual cue, wrist band, yellow gown, etc. Monitor gait and stability. Monitor for mental status changes and reorient to person, place, and time. Review medications for side effects contributing to fall risk. Reinforce activity limits and safety measures with patient and family. Provide visual clues: red socks.

## 2019-05-24 NOTE — H&P ADULT - NSHPSOCIALHISTORY_GEN_ALL_CORE
pt lives alone in an elevator building  has HHA who comes for a few hours a day  walks with walker  denies drug use  drinks socially (last drink was in january )  former smoker

## 2019-05-24 NOTE — H&P ADULT - PROBLEM SELECTOR PLAN 2
Pt with known hx of CLL with multiple transfusions in past most recent a week ago at [facility]   Hg  7.1<10(april 2019 )  etiology likely related to ACD in setting of malignancy   previous iron studies showing low serum iron and high ferritin  transfuse 2 U PRBC  F/U post transfusion CBC

## 2019-05-24 NOTE — H&P ADULT - PROBLEM SELECTOR PLAN 5
Pt with PMH of PUD, obtain collateral in am  will start on Protonix 40 mg daily Pt with PMH of PUD, recent admission at [facility] where he had an EGD  As per patient he had 2 ulcers, one was clipped  will start on Protonix 40 mg daily  obtain collateral in am

## 2019-05-24 NOTE — ED PROVIDER NOTE - NS ED ROS FT
Constitutional: No fever or chills.   Eyes: No pain, blurry vision, or discharge.  ENMT: No hearing changes, pain, discharge or infections. No neck pain or stiffness.  Cardiac: No chest pain, SOB. No chest pain with exertion.  Respiratory: No cough or respiratory distress. No hemoptysis. No history of asthma or RAD.  GI: No nausea, vomiting, diarrhea or abdominal pain. No melena or hematochezia  : No dysuria, frequency or burning.  MS: No myalgia, muscle weakness, joint pain or back pain.  Neuro: No headache or weakness. No LOC.  Skin: No skin rash.   Endocrine: No history of thyroid disease or diabetes.  Except as documented in the HPI, all other systems are negative.

## 2019-05-24 NOTE — PATIENT PROFILE ADULT - VISION (WITH CORRECTIVE LENSES IF THE PATIENT USUALLY WEARS THEM):
Partially impaired: cannot see medication labels or newsprint, but can see obstacles in path, and the surrounding layout; can count fingers at arm's length/Have hearing aids

## 2019-05-24 NOTE — DISCUSSION/SUMMARY
[FreeTextEntry1] : CAD, edema, tendonitis, pseudogout--labs drawn and sent. Restart Lisinopril at 2.5 mg daily. dopplers of . Dr Oliveira contacted. [facility] contacted. [facility] for [facility] at [facility]

## 2019-05-24 NOTE — ED PROVIDER NOTE - OBJECTIVE STATEMENT
Pt w/ PMHx CLL (not on tx, monitoring); anemia s/p transfusion x 3 this year, most recently 1 month ago, on iron; PUD, pseudogout Pt w/ PMHx CLL (not on tx, monitoring;  heme Dr Oliveira); anemia s/p transfusion x 3 this year, most recently 1 month ago, on iron; PUD, pseudogout; HTN; CAD (previously on ASA/Plavix, however d/c'd on last admission vs at [facility]), p/w b/l LE edema. Pt was admitted at [facility] 4/24-4/26 for symptomatic anemia, transfused 1 unit pRBC, and discharged to [facility], from which pt was discharged to home 5 days ago. Pt reports all of his medications were discontinued except for vitamins. Pt began having b/l LE edema. Pt went to see cardiologist Dr Hodge today, whom sent the pt for b/l doppler in the vascular lab, revealing b/l DVT. No CP, SOB, palpitations, or FISH. Pt reports fatigue / weakness.

## 2019-05-24 NOTE — H&P ADULT - NSICDXPASTMEDICALHX_GEN_ALL_CORE_FT
PAST MEDICAL HISTORY:  CAD (coronary artery disease)     CLL (chronic lymphocytic leukemia)     H/O radiculopathy     HTN (hypertension)     PUD (peptic ulcer disease)     Upper GI bleed

## 2019-05-24 NOTE — H&P ADULT - NSHPLABSRESULTS_GEN_ALL_CORE
.  LABS:                         7.1    7.19  )-----------( 182      ( 24 May 2019 14:26 )             23.4     05-24    146<H>  |  107  |  27<H>  ----------------------------<  109<H>  4.9   |  26  |  1.28    Ca    8.8      24 May 2019 14:26    TPro  6.3  /  Alb  3.2<L>  /  TBili  0.4  /  DBili  x   /  AST  25  /  ALT  32  /  AlkPhos  68  05-24    PT/INR - ( 24 May 2019 14:26 )   PT: 14.5 sec;   INR: 1.27          PTT - ( 24 May 2019 14:26 )  PTT:33.2 sec              RADIOLOGY, EKG & ADDITIONAL TESTS: Reviewed.

## 2019-05-24 NOTE — PATIENT PROFILE ADULT - ABILITY TO HEAR (WITH HEARING AID OR HEARING APPLIANCE IF NORMALLY USED):
Mildly to Moderately Impaired: difficulty hearing in some environments or speaker may need to increase volume or speak distinctly/wears glasses

## 2019-05-24 NOTE — H&P ADULT - PROBLEM SELECTOR PLAN 6
pt with PMH of CAD, was on aspirin and Plavix which was recently stopped on recent admission at [facility] -unclear why it was stopped but patient also with recent hx of PUD, GI bleed  will continue to hold and obtain collateral  As per patient his cardiologist is Dr Hodge, he had a full cardiac workup recently which included echo ? showing no gross abnormalities  obtain collateral in am

## 2019-05-24 NOTE — ED PROVIDER NOTE - PHYSICAL EXAMINATION
Constitutional: Well appearing, well nourished, awake, alert, oriented to person, place, time/situation and in no apparent distress.  ENMT: Airway patent. Normal MM  Eyes: Clear bilaterally  Cardiac: Normal rate, regular rhythm.  Heart sounds S1, S2.  No murmurs, rubs or gallops.  Respiratory: Breaths sounds equal and clear b/l. No increased WOB, tachypnea, hypoxia, or accessory mm use. Pt speaks in full sentences.   Gastrointestinal: Abd soft, NT, ND, NABS. No guarding, rebound, or rigidity. No pulsatile abdominal masses. No organomegaly appreciated. No CVAT   Musculoskeletal: Range of motion is not limited. + b/l 2+ pitting edema w/ calf ttp.   Neuro: Alert and oriented x 3, face symmetric and speech fluent. Strength 5/5 x 4 ext and symmetric, nml gross motor movement, nml gait. No focal deficits noted.  Skin: Skin normal color for race, warm, dry and intact. No evidence of rash. 1 open and 1 closed blister to R foot, C/D/I  Psych: Alert and oriented to person, place, time/situation. normal mood and affect. no apparent risk to self or others.

## 2019-05-24 NOTE — ED ADULT NURSE NOTE - PMH
CAD (coronary artery disease)    CLL (chronic lymphocytic leukemia)    H/O radiculopathy    HTN (hypertension)    PUD (peptic ulcer disease)    Upper GI bleed

## 2019-05-24 NOTE — H&P ADULT - ASSESSMENT
89 yo M w/ hx CLL, PUD , gout, CAD, s/p 2 stents(last in 2010 ) p/w bilateral LE dvt?(diagnosed on vascular study )cervical radiculopathy , colon polyps presenting to ED after vascular study significant for b/l LE dvt .

## 2019-05-24 NOTE — H&P ADULT - NSHPPHYSICALEXAM_GEN_ALL_CORE
.  VITAL SIGNS:  T(C): 36.4 (05-24-19 @ 17:51), Max: 36.5 (05-24-19 @ 15:19)  T(F): 97.6 (05-24-19 @ 17:51), Max: 97.7 (05-24-19 @ 15:19)  HR: 79 (05-24-19 @ 17:51) (78 - 87)  BP: 138/72 (05-24-19 @ 17:51) (125/61 - 138/82)  BP(mean): --  RR: 16 (05-24-19 @ 17:51) (16 - 18)  SpO2: 99% (05-24-19 @ 17:51) (97% - 99%)  Wt(kg): --    PHYSICAL EXAM:    Constitutional: WDWN resting comfortably in bed; NAD  ENT: dry mucous membranes  Neck: supple; no JVD or thyromegaly  Respiratory: CTA B/L; no W/R/R, no retractions  Cardiac: +S1/S2; RRR  Gastrointestinal: abdomen soft, NT/ND; no rebound or guarding; +BSx4  Extremities: bilateral LE edema R>>L, 2+  Vascular: 2+ radial, femoral, DP/PT pulses B/L  Dermatologic: skin warm, dry   Neurologic: AAOx3; CNII-XII grossly intact; no focal deficits  Psychiatric: affect and characteristics of appearance, verbalizations, behaviors are appropriate

## 2019-05-24 NOTE — H&P ADULT - PROBLEM SELECTOR PLAN 4
pt with PMH of htn, was on lisinopril 20, HCTZ and isosorbide in past-unclear dose   pt medications was discontinued on recent admission at [facility], and as per patient not continued at [facility]  will obtain collateral from PMD in am  currently normotensive

## 2019-05-25 ENCOUNTER — TRANSCRIPTION ENCOUNTER (OUTPATIENT)
Age: 84
End: 2019-05-25

## 2019-05-25 LAB
ANION GAP SERPL CALC-SCNC: 11 MMOL/L — SIGNIFICANT CHANGE UP (ref 5–17)
BUN SERPL-MCNC: 20 MG/DL — SIGNIFICANT CHANGE UP (ref 7–23)
CALCIUM SERPL-MCNC: 8.8 MG/DL — SIGNIFICANT CHANGE UP (ref 8.4–10.5)
CHLORIDE SERPL-SCNC: 105 MMOL/L — SIGNIFICANT CHANGE UP (ref 96–108)
CO2 SERPL-SCNC: 27 MMOL/L — SIGNIFICANT CHANGE UP (ref 22–31)
CREAT SERPL-MCNC: 1.15 MG/DL — SIGNIFICANT CHANGE UP (ref 0.5–1.3)
GLUCOSE SERPL-MCNC: 86 MG/DL — SIGNIFICANT CHANGE UP (ref 70–99)
HCT VFR BLD CALC: 28.3 % — LOW (ref 39–50)
HGB BLD-MCNC: 8.5 G/DL — LOW (ref 13–17)
MAGNESIUM SERPL-MCNC: 1.6 MG/DL — SIGNIFICANT CHANGE UP (ref 1.6–2.6)
MCHC RBC-ENTMCNC: 28.2 PG — SIGNIFICANT CHANGE UP (ref 27–34)
MCHC RBC-ENTMCNC: 30 GM/DL — LOW (ref 32–36)
MCV RBC AUTO: 94 FL — SIGNIFICANT CHANGE UP (ref 80–100)
NRBC # BLD: 0 /100 WBCS — SIGNIFICANT CHANGE UP (ref 0–0)
PLATELET # BLD AUTO: 186 K/UL — SIGNIFICANT CHANGE UP (ref 150–400)
POTASSIUM SERPL-MCNC: 4.6 MMOL/L — SIGNIFICANT CHANGE UP (ref 3.5–5.3)
POTASSIUM SERPL-SCNC: 4.6 MMOL/L — SIGNIFICANT CHANGE UP (ref 3.5–5.3)
RBC # BLD: 3.01 M/UL — LOW (ref 4.2–5.8)
RBC # FLD: 15.6 % — HIGH (ref 10.3–14.5)
SODIUM SERPL-SCNC: 143 MMOL/L — SIGNIFICANT CHANGE UP (ref 135–145)
WBC # BLD: 7.7 K/UL — SIGNIFICANT CHANGE UP (ref 3.8–10.5)
WBC # FLD AUTO: 7.7 K/UL — SIGNIFICANT CHANGE UP (ref 3.8–10.5)

## 2019-05-25 PROCEDURE — 99222 1ST HOSP IP/OBS MODERATE 55: CPT

## 2019-05-25 PROCEDURE — 99233 SBSQ HOSP IP/OBS HIGH 50: CPT

## 2019-05-25 RX ADMIN — APIXABAN 10 MILLIGRAM(S): 2.5 TABLET, FILM COATED ORAL at 09:21

## 2019-05-25 RX ADMIN — APIXABAN 10 MILLIGRAM(S): 2.5 TABLET, FILM COATED ORAL at 23:42

## 2019-05-25 RX ADMIN — PANTOPRAZOLE SODIUM 40 MILLIGRAM(S): 20 TABLET, DELAYED RELEASE ORAL at 05:55

## 2019-05-25 NOTE — DIETITIAN INITIAL EVALUATION ADULT. - PROBLEM SELECTOR PLAN 6
pt with PMH of CAD, was on aspirin and Plavix which was recently stopped on recent admission at [facility] -unclear why it was stopped but patient also with recent hx of PUD, GI bleed  will continue to hold and obtain collateral  As per patient his cardiologist is Dr Hodge, he had a full cardiac workup recently which included echo ? showing no gross abnormalities  obtain collateral in am

## 2019-05-25 NOTE — PHYSICAL THERAPY INITIAL EVALUATION ADULT - PATIENT/FAMILY/SIGNIFICANT OTHER GOALS STATEMENT, PT EVAL
Pt. would like to be independent again with bed mobility and being able to lift his L leg onto bed independently.

## 2019-05-25 NOTE — DISCHARGE NOTE PROVIDER - CARE PROVIDERS DIRECT ADDRESSES
[email or web address]

## 2019-05-25 NOTE — DISCHARGE NOTE PROVIDER - HOSPITAL COURSE
An 88 yo M w/ hx CLL, PUD , gout, CAD, s/p 2 stents(last in 2010 ) p/w bilateral LE dvt?(diagnosed on vascular study )cervical radiculopathy , colon polyps presenting to ED after LE USG significant for b/l LE dvt . Pt noted swelling for past 4 days since being discharged from [facility]. Right leg notably more swollen compared to left LE.  He denies any recent travel, denies sob, cp, denies dizziness. Pt went to his cardiologist Dr Hodge office who proceeded to do an USG which showed bilateral acute on chronic LE DVT. Study scanned in alpha. Study showing  left acute DVT in popliteal  and post tibial veins. Right sided acute DVT in the femoral and popliteal veins. 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     Of note Pt was recently admitted at [facility] in march(hx of EGD, noted to have 2 ulcers , s/p clipping of ulcer )  and subsequently discharged to [facility] on march 27th. He was sent from [facility] to [facility] a week ago for blood transfusion. Pt discharged from [facility] on May 20th. ED labs showing hg 7.1<10(april 2019 ), wbc 7.19, platelet 182, INR 1.2, PT 14.5, , BUN 27, CR 1.28. Patient started on Eliquis 10 mg BID, transfused 1 unit PRBC. PT walked with patient. Recommended         Patient hemodynamically stable and ready fo discharge to An 88 yo M w/ hx CLL, PUD , gout, CAD, s/p 2 stents(last in 2010 ) p/w bilateral LE dvt?(diagnosed on vascular study )cervical radiculopathy , colon polyps presenting to ED after LE USG significant for b/l LE dvt . Pt noted swelling for past 4 days since being discharged from [facility]. Right leg notably more swollen compared to left LE.  He denies any recent travel, denies sob, cp, denies dizziness. Pt went to his cardiologist Dr Hodge office who proceeded to do an USG which showed bilateral acute on chronic LE DVT. Study scanned in alpha. Study showing  left acute DVT in popliteal  and post tibial veins. Right sided acute DVT in the femoral and popliteal veins. 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     Of note Pt was recently admitted at [facility] in march(hx of EGD, noted to have 2 ulcers , s/p clipping of ulcer )  and subsequently discharged to [facility] on march 27th. He was sent from [facility] to [facility] a week ago for blood transfusion. Pt discharged from [facility] on May 20th. ED labs showing hg 7.1<10(april 2019 ), wbc 7.19, platelet 182, INR 1.2, PT 14.5, , BUN 27, CR 1.28. Patient started on Eliquis 10 mg BID, transfused 1 unit PRBC. PT walked with patient. Recommended home PT         Patient hemodynamically stable and ready fo discharge to home with home PT. An 88 yo M w/ hx CLL, PUD , gout, CAD, s/p 2 stents(last in 2010 ) p/w bilateral LE dvt?(diagnosed on vascular study )cervical radiculopathy , colon polyps presenting to ED after LE USG significant for b/l LE dvt . Pt noted swelling for past 4 days since being discharged from [facility]. Right leg notably more swollen compared to left LE.  He denies any recent travel, denies sob, cp, denies dizziness. Pt went to his cardiologist Dr Hodge office who proceeded to do an USG which showed bilateral acute on chronic LE DVT. Study scanned in alpha. Study showing  left acute DVT in popliteal  and post tibial veins. Right sided acute DVT in the femoral and popliteal veins. 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     Of note Pt was recently admitted at [facility] in march(hx of EGD, noted to have 2 ulcers , s/p clipping of ulcer )  and subsequently discharged to [facility] on march 27th. He was sent from [facility] to [facility] a week ago for blood transfusion. Pt discharged from [facility] on May 20th. ED labs showing hg 7.1<10(april 2019 ), wbc 7.19, platelet 182, INR 1.2, PT 14.5, , BUN 27, CR 1.28. Patient started on Eliquis 10 mg BID, transfused 1 unit PRBC. PT walked with patient. Recommended home PT         Patient hemodynamically stable and ready for discharge to home with home PT.

## 2019-05-25 NOTE — PHYSICAL THERAPY INITIAL EVALUATION ADULT - ADDITIONAL COMMENTS
Pt. reports using RW since recent d/c from [facility]. Pt. has neighbor who helps x2 hrs twice a day to assist with bed mobility and meals in the morning and at bed time. Pt manages the middle portion of the day independently, the neighbor checks in periodically. Pt. lives in an elevator building.

## 2019-05-25 NOTE — CHART NOTE - NSCHARTNOTEFT_GEN_A_CORE
Upon Nutritional Assessment by the Registered Dietitian your patient was determined to meet criteria / has evidence of the following diagnosis/diagnoses:          [ ]  Mild Protein Calorie Malnutrition        [ ]  Moderate Protein Calorie Malnutrition        [ x] Severe Protein Calorie Malnutrition        [ ] Unspecified Protein Calorie Malnutrition        [ ] Underweight / BMI <19        [ ] Morbid Obesity / BMI > 40      Findings as based on:  •  Comprehensive nutrition assessment and consultation  •  Calorie counts (nutrient intake analysis)  •  Food acceptance and intake status from observations by staff  •  Follow up  •  Patient education  •  Intervention secondary to interdisciplinary rounds  •   concerns    Unintended 15.6% wt loss. Pt eats 3 nutrient dense meals a day. Although pt eats 3 nutrient dense meals/day suspect overall kcal/pro intake is below estimated needs.  Catabolic state d/t pressure ulcer.     Treatment:    The following diet has been recommended:  1) Continue on DASH/TLC diet  2) EnsureEnlive TID (1050kcal, 60g pro)  3) MVI daily  4) Trend weights    PROVIDER Section:     By signing this assessment you are acknowledging and agree with the diagnosis/diagnoses assigned by the Registered Dietitian    Comments:

## 2019-05-25 NOTE — DISCHARGE NOTE PROVIDER - PROVIDER TOKENS
PROVIDER:[TOKEN:[[id number]]] PROVIDER:[TOKEN:[[id number]]],PROVIDER:[TOKEN:[[id number]]]

## 2019-05-25 NOTE — DIETITIAN INITIAL EVALUATION ADULT. - PROBLEM SELECTOR PLAN 2
Pt with known hx of CLL with multiple transfusions in past most recent a week ago at [facility]   Hg  7.1<10(april 2019 )  etiology likely related to ACD in setting of malignancy   previous iron studies showing low serum iron and high ferritin  transfuse 2 U PRBC  F/U post transfusion CBC

## 2019-05-25 NOTE — PHYSICAL THERAPY INITIAL EVALUATION ADULT - PREDICTED DURATION OF THERAPY (DAYS/WKS), PT EVAL
Pt. would benefit from further PT follow up to improve bed mobility, transfers, gait, endurance, balance.

## 2019-05-25 NOTE — DIETITIAN INITIAL EVALUATION ADULT. - PROBLEM SELECTOR PLAN 1
Pt with B/l lower extremity edema for 4 days  Study scanned in alpha. Study showing  left acute DVT in popliteal  and post tibial veins. Right sided acute DVT in the femoral and popliteal veins. 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   pt with risk factors such as malignancy combined with immobility which could predispose him to developing dvt  denies sob, not hypoxic, no chest pain, little concern for acute PE  As per Dr Oliveira Eliquis recently approved for use in malignancy  will start Eliquis  10 mg bid fo 14 days and then transition to 5 mg bid

## 2019-05-25 NOTE — DISCHARGE NOTE PROVIDER - NSDCCPCAREPLAN_GEN_ALL_CORE_FT
PRINCIPAL DISCHARGE DIAGNOSIS  Diagnosis: Acute deep vein thrombosis (DVT) of both lower extremities, unspecified vein  Assessment and Plan of Treatment: You presented with lower extremity swelling. ultrasound showed bilateral clots in your legs. We started you on a blood thinner called Eliquis. Please continue taking Eliquis 10mg twice a day for 13 more days and then transition to 5mg twice a day for at least three months. Please follow up with Dr. Oliveira in 1-2 weeks for further management of care.      SECONDARY DISCHARGE DIAGNOSES  Diagnosis: PUD (peptic ulcer disease)  Assessment and Plan of Treatment: Continue Omeprazole 40mg daily.    Diagnosis: Anemia, unspecified type  Assessment and Plan of Treatment: You have a history of low blood levels requiring blood transfusions. You received 2U blood in the hospital. Please follow up with Dr. Oliveira in 1-2 weeks for further management of care. PRINCIPAL DISCHARGE DIAGNOSIS  Diagnosis: Acute deep vein thrombosis (DVT) of both lower extremities, unspecified vein  Assessment and Plan of Treatment: You presented with lower extremity swelling. ultrasound showed bilateral clots in your legs. We started you on a blood thinner called Eliquis. Please continue taking Eliquis 10mg twice a day for 13 more days and then transition to 5mg twice a day for at least three months. Please follow up with Dr. Oliveira in 1-2 weeks for further management of care.      SECONDARY DISCHARGE DIAGNOSES  Diagnosis: PUD (peptic ulcer disease)  Assessment and Plan of Treatment: Continue Omeprazole 40mg daily. Please schedule appointment with Dr. Gerber for possible outpatient endoscopy given your low blood levels.    Diagnosis: Anemia, unspecified type  Assessment and Plan of Treatment: You have a history of low blood levels requiring blood transfusions. You received 2U blood in the hospital. Please follow up with Dr. Oliveira in 1-2 weeks for further management of care.

## 2019-05-25 NOTE — CONSULT NOTE ADULT - SUBJECTIVE AND OBJECTIVE BOX
HPI:  89 yo M w/ hx CLL, PUD , gout, CAD, s/p 2 stents(last in 2010 ) p/w bilateral LE dvt?(diagnosed on vascular study )cervical radiculopathy , colon polyps presenting to ED after LE USG significant for b/l LE dvt . Pt noted swelling for past 4 days since being discharged from [facility]. Right leg notably more swollen compared to left LE.  He denies any recent travel, denies sob, cp, denies dizziness. Pt went to his cardiologist Dr Hodge office who proceeded to do an USG on him today which showed bilateral acute on chronic LE DVT. Study scanned in alpha. Study showing  left acute DVT in popliteal  and post tibial veins. Right sided acute DVT in the femoral and popliteal veins. 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   Of note Pt was recently admitted at [facility] in march(hx of EGD, noted to have 2 ulcers , s/p clipping of ulcer )  and subsequently discharged to [facility] on march 27th. He was sent from [facility] to [facility] a week ago for blood transfusion. Pt discharged from [facility] on May 20th. ED labs showing hg 7.1<10(april 2019 ), wbc 7.19, platelet 182, INR 1.2, PT 14.5, , BUN 27, CR 1.28  ED management : pt started on Eliquis 10 mg bid , transfused 1 unit PRBC.  Pt admitted to [facility] for further management (24 May 2019 18:00)      PAST MEDICAL & SURGICAL HISTORY:  H/O radiculopathy  CLL (chronic lymphocytic leukemia)  CAD (coronary artery disease)  PUD (peptic ulcer disease)  Upper GI bleed  HTN (hypertension)  No significant past surgical history       Review of Systems:  · General	negative  · Skin/Breast	negative  · Ophthalmologic	negative  · ENMT	negative  · Respiratory and Thorax	negative  · Cardiovascular	negative  · Gastrointestinal	negative  · Genitourinary	negative  · Musculoskeletal Comments	negative  · Neurological	negative      MEDICATIONS  (STANDING):  apixaban 10 milliGRAM(s) Oral every 12 hours  lisinopril 2.5 milliGRAM(s) Oral daily  pantoprazole    Tablet 40 milliGRAM(s) Oral before breakfast    MEDICATIONS  (PRN):      Allergies    No Known Allergies    Intolerances        SOCIAL HISTORY:    FAMILY HISTORY:      Vital Signs Last 24 Hrs  T(C): 36.9 (26 May 2019 20:56), Max: 36.9 (26 May 2019 20:56)  T(F): 98.5 (26 May 2019 20:56), Max: 98.5 (26 May 2019 20:56)  HR: 82 (26 May 2019 20:56) (67 - 82)  BP: 121/62 (26 May 2019 20:56) (121/62 - 134/80)  BP(mean): --  RR: 18 (26 May 2019 20:56) (18 - 18)  SpO2: 95% (26 May 2019 20:56) (94% - 97%)     Physical Exam:  · Constitutional	detailed exam  · Constitutional Details	well-developed; well-groomed  · Eyes	EOMI; PERRL; no drainage or redness  · ENMT Comments	dry mucous membranes  · Respiratory	detailed exam  · Respiratory Comments	normal breath sounds at the lung bases bilaterally  · Cardiovascular	Regular rate & rhythm, normal S1, S2; no murmurs, gallops or rubs; no S3, S4  · Abd-Soft non tender  ·Ext-no edema, clubbing or cyanosis      LABS:                        8.8    7.76  )-----------( 197      ( 26 May 2019 07:30 )             28.6     05-26    142  |  105  |  20  ----------------------------<  92  4.4   |  27  |  1.37<H>    Ca    8.9      26 May 2019 07:30  Mg     1.7     05-26            RADIOLOGY & ADDITIONAL STUDIES:

## 2019-05-25 NOTE — DIETITIAN INITIAL EVALUATION ADULT. - PROBLEM SELECTOR PLAN 4
pt with PMH of htn, was on lisinopril 20, HCTZ and isosorbide in past-unclear dose   pt medications was discontinued on recent admission at [facility], and as per patient not continued at [facility]  will obtain collateral from PMD in am  currently normotensive

## 2019-05-25 NOTE — DISCHARGE NOTE PROVIDER - NSDCFUADDAPPT_GEN_ALL_CORE_FT
Please follow up with Dr. Oliveira in 1-2 weeks for further management of care. Please follow up with Dr. Oliveira in 1-2 weeks for further management of care.    Call Dr. Gerber to evaluate need for possible endoscopy outpatient as your blood levels are still low.

## 2019-05-25 NOTE — DIETITIAN INITIAL EVALUATION ADULT. - OTHER INFO
"Chief Complaint   Patient presents with     Well Child       Initial Temp 96.6  F (35.9  C) (Axillary)  Ht 2' 9.5\" (0.851 m)  Wt 26 lb 4 oz (11.9 kg)  HC 18.62\" (47.3 cm)  BMI 16.45 kg/m2 Estimated body mass index is 16.45 kg/(m^2) as calculated from the following:    Height as of this encounter: 2' 9.5\" (0.851 m).    Weight as of this encounter: 26 lb 4 oz (11.9 kg).  Medication Reconciliation: complete    " 89yo M, PMH of CLL, PUD, gout, CAD, s/p 2 stents. p/w bilateral LE dvt. Admitted for bilateral EL VTE. Pt seen in room, resting in bed. Currently on a DASH/TLC diet and tolerating PO. Reports having chicken and vegetables for lunch today. Denies GI distress, no N/V/D/C. Per typical diet recall at home, pt consumes a varied diet with healthy proteins, fruits and vegetables. Pt noted to have significant wt loss. Reports is UBW was 200lbs w/ an intentional 40lb wt loss by eating healthier. Since then, wt has continued to drop and reported current BW is 135lbs. Pt reports wanting to gain 15lbs. D/w pt adding nutrient dense snacks throughout the day (cottage cheese, avocado fruit w/ nut butters). Discussed importance of protein for weight and for pressure ulcer healing. Pt was receptive and agreeable. NKFA or dietary restrictions. Skin: stage 2 pressure ulcer on R buttocks. RD to follow.

## 2019-05-25 NOTE — DIETITIAN INITIAL EVALUATION ADULT. - PROBLEM SELECTOR PLAN 5
Pt with PMH of PUD, recent admission at [facility] where he had an EGD  As per patient he had 2 ulcers, one was clipped  will start on Protonix 40 mg daily  obtain collateral in am

## 2019-05-25 NOTE — DISCHARGE NOTE PROVIDER - CARE PROVIDER_API CALL
Brandy Oliveira)  Internal Medicine  [address]  [place], [place] [id number]  Phone: [phone]  Fax: [phone]  Follow Up Time: Brandy Oliveira)  Internal Medicine  [address], [address] [place] [address]  Phone: [phone]  Fax: [phone]  Follow Up Time:     Mikhail Gerber)  Medicine  [address], [address]  [place] [address]  Phone: [phone]  Fax: [phone]  Follow Up Time:

## 2019-05-26 DIAGNOSIS — I82.403 ACUTE EMBOLISM AND THROMBOSIS OF UNSPECIFIED DEEP VEINS OF LOWER EXTREMITY, BILATERAL: ICD-10-CM

## 2019-05-26 LAB
ANION GAP SERPL CALC-SCNC: 10 MMOL/L — SIGNIFICANT CHANGE UP (ref 5–17)
BUN SERPL-MCNC: 20 MG/DL — SIGNIFICANT CHANGE UP (ref 7–23)
CALCIUM SERPL-MCNC: 8.9 MG/DL — SIGNIFICANT CHANGE UP (ref 8.4–10.5)
CHLORIDE SERPL-SCNC: 105 MMOL/L — SIGNIFICANT CHANGE UP (ref 96–108)
CO2 SERPL-SCNC: 27 MMOL/L — SIGNIFICANT CHANGE UP (ref 22–31)
CREAT SERPL-MCNC: 1.37 MG/DL — HIGH (ref 0.5–1.3)
GLUCOSE SERPL-MCNC: 92 MG/DL — SIGNIFICANT CHANGE UP (ref 70–99)
HCT VFR BLD CALC: 28.6 % — LOW (ref 39–50)
HGB BLD-MCNC: 8.8 G/DL — LOW (ref 13–17)
MAGNESIUM SERPL-MCNC: 1.7 MG/DL — SIGNIFICANT CHANGE UP (ref 1.6–2.6)
MCHC RBC-ENTMCNC: 28.8 PG — SIGNIFICANT CHANGE UP (ref 27–34)
MCHC RBC-ENTMCNC: 30.8 GM/DL — LOW (ref 32–36)
MCV RBC AUTO: 93.5 FL — SIGNIFICANT CHANGE UP (ref 80–100)
NRBC # BLD: 0 /100 WBCS — SIGNIFICANT CHANGE UP (ref 0–0)
PLATELET # BLD AUTO: 197 K/UL — SIGNIFICANT CHANGE UP (ref 150–400)
POTASSIUM SERPL-MCNC: 4.4 MMOL/L — SIGNIFICANT CHANGE UP (ref 3.5–5.3)
POTASSIUM SERPL-SCNC: 4.4 MMOL/L — SIGNIFICANT CHANGE UP (ref 3.5–5.3)
RBC # BLD: 3.06 M/UL — LOW (ref 4.2–5.8)
RBC # FLD: 15.7 % — HIGH (ref 10.3–14.5)
SODIUM SERPL-SCNC: 142 MMOL/L — SIGNIFICANT CHANGE UP (ref 135–145)
WBC # BLD: 7.76 K/UL — SIGNIFICANT CHANGE UP (ref 3.8–10.5)
WBC # FLD AUTO: 7.76 K/UL — SIGNIFICANT CHANGE UP (ref 3.8–10.5)

## 2019-05-26 PROCEDURE — 99233 SBSQ HOSP IP/OBS HIGH 50: CPT

## 2019-05-26 RX ORDER — IRON SUCROSE 20 MG/ML
200 INJECTION, SOLUTION INTRAVENOUS EVERY 24 HOURS
Refills: 0 | Status: DISCONTINUED | OUTPATIENT
Start: 2019-05-26 | End: 2019-05-27

## 2019-05-26 RX ORDER — LISINOPRIL 2.5 MG/1
2.5 TABLET ORAL DAILY
Refills: 0 | Status: DISCONTINUED | OUTPATIENT
Start: 2019-05-26 | End: 2019-05-27

## 2019-05-26 RX ORDER — LISINOPRIL 2.5 MG/1
2.5 TABLET ORAL DAILY
Refills: 0 | Status: DISCONTINUED | OUTPATIENT
Start: 2019-05-26 | End: 2019-05-26

## 2019-05-26 RX ORDER — MAGNESIUM SULFATE 500 MG/ML
2 VIAL (ML) INJECTION ONCE
Refills: 0 | Status: COMPLETED | OUTPATIENT
Start: 2019-05-26 | End: 2019-05-26

## 2019-05-26 RX ADMIN — PANTOPRAZOLE SODIUM 40 MILLIGRAM(S): 20 TABLET, DELAYED RELEASE ORAL at 07:08

## 2019-05-26 RX ADMIN — LISINOPRIL 2.5 MILLIGRAM(S): 2.5 TABLET ORAL at 13:26

## 2019-05-26 RX ADMIN — APIXABAN 10 MILLIGRAM(S): 2.5 TABLET, FILM COATED ORAL at 21:38

## 2019-05-26 RX ADMIN — Medication 50 GRAM(S): at 10:25

## 2019-05-26 RX ADMIN — APIXABAN 10 MILLIGRAM(S): 2.5 TABLET, FILM COATED ORAL at 10:25

## 2019-05-26 NOTE — PROGRESS NOTE ADULT - PROBLEM SELECTOR PLAN 6
pt with PMH of CAD, was on aspirin and Plavix which was recently stopped on recent admission at [facility] -unclear why it was stopped but patient also with recent hx of PUD, GI bleed  will continue to hold and obtain collateral  As per patient his cardiologist is Dr Hodge, he had a full cardiac workup recently which included echo ? showing no gross abnormalities  obtain collateral in am

## 2019-05-26 NOTE — PROGRESS NOTE ADULT - PROBLEM SELECTOR PLAN 4
Called patient's pharmacy [facility] on [address], patient only on lisinopril 2.5 mg QD  - C/w lisinopril 2.5 mg qd, monitor uptrending Cr, monitor K

## 2019-05-26 NOTE — PROGRESS NOTE ADULT - PROBLEM SELECTOR PLAN 5
Pt with PMH of PUD, recent admission at [facility] where he had an EGD  As per patient he had 2 ulcers, one was clipped  will start on Protonix 40 mg daily  obtain collateral in am

## 2019-05-26 NOTE — PROGRESS NOTE ADULT - PROBLEM SELECTOR PLAN 2
Pt with known hx of CLL with multiple transfusions in past most recent a week ago at [facility]; Hg  7.1<10(april 2019 ); etiology likely related to ACD in setting of malignancy; previous iron studies showing low serum iron and high ferritin, s/p 2 PRBC  - Trend H/H and keep active T&S, transfuse >7

## 2019-05-26 NOTE — PROGRESS NOTE ADULT - ATTENDING COMMENTS
Patient was seen and examined by me at bedside. I agree with resident's note, subjective, objective physical exam, assessment and plan with following modifications/additions.    Medically optimized for discharge  Will follow up with Dr. Gerber for GI w/u

## 2019-05-27 ENCOUNTER — TRANSCRIPTION ENCOUNTER (OUTPATIENT)
Age: 84
End: 2019-05-27

## 2019-05-27 VITALS
RESPIRATION RATE: 18 BRPM | TEMPERATURE: 97 F | HEART RATE: 72 BPM | OXYGEN SATURATION: 97 % | SYSTOLIC BLOOD PRESSURE: 111 MMHG | DIASTOLIC BLOOD PRESSURE: 64 MMHG

## 2019-05-27 PROCEDURE — 86923 COMPATIBILITY TEST ELECTRIC: CPT

## 2019-05-27 PROCEDURE — 86900 BLOOD TYPING SEROLOGIC ABO: CPT

## 2019-05-27 PROCEDURE — 99285 EMERGENCY DEPT VISIT HI MDM: CPT | Mod: 25

## 2019-05-27 PROCEDURE — 36430 TRANSFUSION BLD/BLD COMPNT: CPT

## 2019-05-27 PROCEDURE — 83735 ASSAY OF MAGNESIUM: CPT

## 2019-05-27 PROCEDURE — 80053 COMPREHEN METABOLIC PANEL: CPT

## 2019-05-27 PROCEDURE — 97161 PT EVAL LOW COMPLEX 20 MIN: CPT

## 2019-05-27 PROCEDURE — 85025 COMPLETE CBC W/AUTO DIFF WBC: CPT

## 2019-05-27 PROCEDURE — 99239 HOSP IP/OBS DSCHRG MGMT >30: CPT

## 2019-05-27 PROCEDURE — 85730 THROMBOPLASTIN TIME PARTIAL: CPT

## 2019-05-27 PROCEDURE — 36415 COLL VENOUS BLD VENIPUNCTURE: CPT

## 2019-05-27 PROCEDURE — 86850 RBC ANTIBODY SCREEN: CPT

## 2019-05-27 PROCEDURE — P9016: CPT

## 2019-05-27 PROCEDURE — 80048 BASIC METABOLIC PNL TOTAL CA: CPT

## 2019-05-27 PROCEDURE — 86901 BLOOD TYPING SEROLOGIC RH(D): CPT

## 2019-05-27 PROCEDURE — 85027 COMPLETE CBC AUTOMATED: CPT

## 2019-05-27 PROCEDURE — 85610 PROTHROMBIN TIME: CPT

## 2019-05-27 RX ORDER — APIXABAN 2.5 MG/1
2 TABLET, FILM COATED ORAL
Qty: 48 | Refills: 0
Start: 2019-05-27 | End: 2019-06-07

## 2019-05-27 RX ORDER — APIXABAN 2.5 MG/1
1 TABLET, FILM COATED ORAL
Qty: 60 | Refills: 3
Start: 2019-05-27 | End: 2019-09-23

## 2019-05-27 RX ORDER — OMEPRAZOLE 10 MG/1
1 CAPSULE, DELAYED RELEASE ORAL
Qty: 30 | Refills: 1
Start: 2019-05-27 | End: 2019-07-25

## 2019-05-27 RX ORDER — LISINOPRIL 2.5 MG/1
1 TABLET ORAL
Qty: 30 | Refills: 0
Start: 2019-05-27 | End: 2019-06-25

## 2019-05-27 RX ADMIN — PANTOPRAZOLE SODIUM 40 MILLIGRAM(S): 20 TABLET, DELAYED RELEASE ORAL at 06:17

## 2019-05-27 RX ADMIN — IRON SUCROSE 110 MILLIGRAM(S): 20 INJECTION, SOLUTION INTRAVENOUS at 06:17

## 2019-05-27 RX ADMIN — LISINOPRIL 2.5 MILLIGRAM(S): 2.5 TABLET ORAL at 06:17

## 2019-05-27 RX ADMIN — APIXABAN 10 MILLIGRAM(S): 2.5 TABLET, FILM COATED ORAL at 08:49

## 2019-05-27 NOTE — PROGRESS NOTE ADULT - ASSESSMENT
89yo M, PMH of CLL, PUD, gout, CAD, s/p 2 stents. p/w bilateral LE dvt. Admitted for bilateral EL VTE.     1) Bilateral LE DVT.   No signs of RHS.   * c/w Eliquis 10mg po BID.     2) PUD. Currently Hb is stable (trending up) and FOBT is negative. No signs of active bleed.   * Dr. Gerber evaluated patient. No need for inpatient work-up. WIll follow up with Dr. Gerber outpatient.    3) CLL   * Dr Oliveira following.     4) Chronic anemia,   Stable.   * Monitor    5) VTE ppx  * On full dose AC.     Optimized for discharge home with PT
89yo M, PMH of CLL, PUD, gout, CAD, s/p 2 stents. p/w bilateral LE dvt. Admitted for bilateral EL VTE.     1) Bilateral LE DVT.   No signs of RHS.   * c/w Eliquis 10mg po BID.   * Can potentially be discharged home if cleared by PT.     2) PUD. Currently Hb is stable (trending up) and FOBT is negative. No signs of active bleed.   * Dr. Gerber consulted by Dr. Oliveira for GI eval and possible EGD?    3) CLL   * Dr Oliveira following.     4) Chronic anemia,   Stable.   * Monitor    5) VTE ppx  * On full dose AC.
88 yo M w/ hx CLL, PUD , gout, CAD, s/p 2 stents(last in 2010 ) p/w bilateral LE dvt?(diagnosed on vascular study )cervical radiculopathy , colon polyps presenting to ED after vascular study significant for b/l LE dvt .

## 2019-05-27 NOTE — PROGRESS NOTE ADULT - SUBJECTIVE AND OBJECTIVE BOX
CC: No overnight events, no complaints.   Feeling well, pain is overall controlled.  Tolerates PO diet (+); Urination (+);  Denies cp, sob, dizziness, HA, abdominal pain, n/v.  Rest of ROS negative.     Vital Signs Last 24 Hrs  T(C): 36.3 (27 May 2019 08:34), Max: 36.9 (26 May 2019 20:56)  T(F): 97.4 (27 May 2019 08:34), Max: 98.5 (26 May 2019 20:56)  HR: 72 (27 May 2019 08:34) (67 - 82)  BP: 111/64 (27 May 2019 08:34) (111/64 - 143/66)  BP(mean): --  RR: 18 (27 May 2019 08:34) (18 - 18)  SpO2: 97% (27 May 2019 08:34) (95% - 97%)    PHYSICAL EXAMINATION  * General: Not in acute distress. Awake and alert. Lying comfortably in bed.  * Head: Normocephalic, atraumatic.  * HEENT: ears no discharge, eyes PERRLA, nose no discharge, throat no exudates, normal tonsils.  * Neck: no JVD, supple.  * Lungs: Clear to auscultation, no rales, no wheezes.  * Cardio: Regular rate and rhythm, no murmurs, no rubs, no gallops. Good peripheral pulses.  * Abdomen: Soft, non-tender, non-distended, tympanic to percussion, no rebound, no guarding, no rigidity. Bowel sounds present. No suprapubic or CVA tenderness.  * Extremities: Acyanotic, no edema.  * Skin: Warm and dry.  * Neuro: Alert and oriented x 3. No focal deficits. Motor strength is 5/5 throughout. Sensation intact. Cranial nerves II-XII grossly intact.                           8.6    7.26  )-----------( 179      ( 27 May 2019 10:29 )             27.7 05-27    140  |  104  |  25<H>  ----------------------------<  108<H>  4.9   |  26  |  1.21    Ca    8.6      27 May 2019 10:29  Mg     1.7     05-27    MEDICATIONS  (STANDING):  apixaban 10 milliGRAM(s) Oral every 12 hours  iron sucrose IVPB 200 milliGRAM(s) IV Intermittent every 24 hours  lisinopril 2.5 milliGRAM(s) Oral daily  pantoprazole    Tablet 40 milliGRAM(s) Oral before breakfast    MEDICATIONS  (PRN):
CC: No overnight events, no complaints.   Feeling well, pain is overall controlled.  Tolerates PO diet (+); Urination (+);  Denies cp, sob, dizziness, HA, abdominal pain, n/v.  Rest of ROS negative.     Vital Signs Last 24 Hrs  T(C): 36.4 (25 May 2019 09:02), Max: 36.6 (24 May 2019 18:11)  T(F): 97.6 (25 May 2019 09:02), Max: 97.8 (24 May 2019 18:11)  HR: 69 (25 May 2019 09:02) (63 - 83)  BP: 142/78 (25 May 2019 09:02) (118/73 - 142/78)  BP(mean): --  RR: 18 (25 May 2019 09:02) (16 - 18)  SpO2: 97% (25 May 2019 09:02) (97% - 99%)    PHYSICAL EXAMINATION  * General: Not in acute distress. Awake and alert. Lying comfortably in bed.  * Head: Normocephalic, atraumatic.  * HEENT: ears no discharge, eyes PERRLA, nose no discharge, throat no exudates, normal tonsils.  * Neck: no JVD, supple.  * Lungs: Clear to auscultation, no rales, no wheezes.  * Cardio: Regular rate and rhythm, no murmurs, no rubs, no gallops. Good peripheral pulses.  * Abdomen: Soft, non-tender, non-distended, tympanic to percussion, no rebound, no guarding, no rigidity. Bowel sounds present. No suprapubic or CVA tenderness.  * Extremities: Acyanotic, no edema.  * Skin: Warm and dry.  * Neuro: Alert and oriented x 3. No focal deficits. Motor strength is 5/5 throughout. Sensation intact. Cranial nerves II-XII grossly intact.                           8.5    7.70  )-----------( 186      ( 25 May 2019 07:38 )             28.3   05-25    143  |  105  |  20  ----------------------------<  86  4.6   |  27  |  1.15    Ca    8.8      25 May 2019 07:38  Mg     1.6     05-25    TPro  6.3  /  Alb  3.2<L>  /  TBili  0.4  /  DBili  x   /  AST  25  /  ALT  32  /  AlkPhos  68  05-24    MEDICATIONS  (STANDING):  apixaban 10 milliGRAM(s) Oral every 12 hours  pantoprazole    Tablet 40 milliGRAM(s) Oral before breakfast    MEDICATIONS  (PRN):
INTERVAL HPI/OVERNIGHT EVENTS:  Patient was seen and examined at bedside. DIANA o/n, patient asking for his "water pill" this AM. Denies f/c/n/v, abd pain, CP, and SOB.    VITAL SIGNS:  T(F): 97.6 (05-26-19 @ 10:08)  HR: 77 (05-26-19 @ 10:08)  BP: 134/80 (05-26-19 @ 10:08)  RR: 18 (05-26-19 @ 10:08)  SpO2: 96% (05-26-19 @ 10:08)  Wt(kg): --    PHYSICAL EXAM:    Constitutional: WDWN, NAD  HEENT: PERRL, EOMI, MMM  Respiratory: CTA b/l, good air entry b/l, no wheezing, no rhonchi, no rales  Cardiovascular: RRR, normal S1S2, no M/R/G  Gastrointestinal: soft, NTND, no masses palpable, BS normal x4  Extremities: Warm, well perfused, 2+ radial and 1+ DP pulses, 1+ b/l pitting edema, no clubbing, open blister wound on dorsal aspect of R foot 2x6 cm w/ adjacent serous blister  Neurological: AAOx3, CN Grossly intact, 5/5 strength b/l UE and LE  Skin: Normal temperature, warm, dry    MEDICATIONS  (STANDING):  apixaban 10 milliGRAM(s) Oral every 12 hours  lisinopril 2.5 milliGRAM(s) Oral daily  pantoprazole    Tablet 40 milliGRAM(s) Oral before breakfast    MEDICATIONS  (PRN):      Allergies    No Known Allergies    Intolerances        LABS:                        8.8    7.76  )-----------( 197      ( 26 May 2019 07:30 )             28.6     05-26    142  |  105  |  20  ----------------------------<  92  4.4   |  27  |  1.37<H>    Ca    8.9      26 May 2019 07:30  Mg     1.7     05-26    TPro  6.3  /  Alb  3.2<L>  /  TBili  0.4  /  DBili  x   /  AST  25  /  ALT  32  /  AlkPhos  68  05-24    PT/INR - ( 24 May 2019 14:26 )   PT: 14.5 sec;   INR: 1.27          PTT - ( 24 May 2019 14:26 )  PTT:33.2 sec      RADIOLOGY & ADDITIONAL TESTS:  No new studies

## 2019-05-27 NOTE — DISCHARGE NOTE NURSING/CASE MANAGEMENT/SOCIAL WORK - NSDCDPATPORTLINK_GEN_ALL_CORE
You can access the [facility] Patient Portal, offered by [facility], by registering with the following website: [email or web address]

## 2019-05-28 ENCOUNTER — INBOUND DOCUMENT (OUTPATIENT)
Age: 84
End: 2019-05-28

## 2019-05-28 LAB
ALBUMIN SERPL ELPH-MCNC: 3.3 G/DL
ALP BLD-CCNC: 73 U/L
ALT SERPL-CCNC: 39 U/L
ANION GAP SERPL CALC-SCNC: 16 MMOL/L
AST SERPL-CCNC: 25 U/L
BASOPHILS # BLD AUTO: 0.1 K/UL
BASOPHILS NFR BLD AUTO: 1 %
BILIRUB SERPL-MCNC: 0.4 MG/DL
BUN SERPL-MCNC: 28 MG/DL
CALCIUM SERPL-MCNC: 9.1 MG/DL
CHLORIDE SERPL-SCNC: 104 MMOL/L
CO2 SERPL-SCNC: 24 MMOL/L
CREAT SERPL-MCNC: 1.32 MG/DL
EOSINOPHIL # BLD AUTO: 0 K/UL
EOSINOPHIL NFR BLD AUTO: 0 %
FERRITIN SERPL-MCNC: 865 NG/ML
GLUCOSE SERPL-MCNC: 105 MG/DL
HCT VFR BLD CALC: 26.3 %
HGB BLD-MCNC: 7.7 G/DL
IRON SATN MFR SERPL: 18 %
IRON SERPL-MCNC: 34 UG/DL
LYMPHOCYTES # BLD AUTO: 8.65 K/UL
LYMPHOCYTES NFR BLD AUTO: 88 %
MAN DIFF?: NORMAL
MCHC RBC-ENTMCNC: 28.4 PG
MCHC RBC-ENTMCNC: 29.3 GM/DL
MCV RBC AUTO: 97 FL
MONOCYTES # BLD AUTO: 0.1 K/UL
MONOCYTES NFR BLD AUTO: 1 %
NEUTROPHILS # BLD AUTO: 0.98 K/UL
NEUTROPHILS NFR BLD AUTO: 10 %
NT-PROBNP SERPL-MCNC: 2419 PG/ML
PLATELET # BLD AUTO: 258 K/UL
POTASSIUM SERPL-SCNC: 5.6 MMOL/L
PROT SERPL-MCNC: 6.2 G/DL
RBC # BLD: 2.71 M/UL
RBC # FLD: 15.9 %
SODIUM SERPL-SCNC: 144 MMOL/L
TIBC SERPL-MCNC: 184 UG/DL
TRANSFERRIN SERPL-MCNC: 157 MG/DL
TSH SERPL-ACNC: 5.7 UIU/ML
UIBC SERPL-MCNC: 150 UG/DL
URATE SERPL-MCNC: 8.6 MG/DL
WBC # FLD AUTO: 9.83 K/UL

## 2019-05-30 ENCOUNTER — APPOINTMENT (OUTPATIENT)
Dept: HEART AND VASCULAR | Facility: CLINIC | Age: 84
End: 2019-05-30
Payer: MEDICARE

## 2019-05-30 ENCOUNTER — MEDICATION RENEWAL (OUTPATIENT)
Age: 84
End: 2019-05-30

## 2019-05-30 ENCOUNTER — LABORATORY RESULT (OUTPATIENT)
Age: 84
End: 2019-05-30

## 2019-05-30 VITALS
HEIGHT: 67 IN | HEART RATE: 75 BPM | SYSTOLIC BLOOD PRESSURE: 120 MMHG | WEIGHT: 133.02 LBS | BODY MASS INDEX: 20.88 KG/M2 | TEMPERATURE: 97.5 F | OXYGEN SATURATION: 96 % | RESPIRATION RATE: 14 BRPM | DIASTOLIC BLOOD PRESSURE: 60 MMHG

## 2019-05-30 DIAGNOSIS — I25.10 ATHEROSCLEROTIC HEART DISEASE OF NATIVE CORONARY ARTERY WITHOUT ANGINA PECTORIS: ICD-10-CM

## 2019-05-30 DIAGNOSIS — E43 UNSPECIFIED SEVERE PROTEIN-CALORIE MALNUTRITION: ICD-10-CM

## 2019-05-30 DIAGNOSIS — Z85.6 PERSONAL HISTORY OF LEUKEMIA: ICD-10-CM

## 2019-05-30 DIAGNOSIS — I82.433 ACUTE EMBOLISM AND THROMBOSIS OF POPLITEAL VEIN, BILATERAL: ICD-10-CM

## 2019-05-30 DIAGNOSIS — Z95.5 PRESENCE OF CORONARY ANGIOPLASTY IMPLANT AND GRAFT: ICD-10-CM

## 2019-05-30 DIAGNOSIS — K27.9 PEPTIC ULCER, SITE UNSPECIFIED, UNSPECIFIED AS ACUTE OR CHRONIC, WITHOUT HEMORRHAGE OR PERFORATION: ICD-10-CM

## 2019-05-30 DIAGNOSIS — D64.9 ANEMIA, UNSPECIFIED: ICD-10-CM

## 2019-05-30 DIAGNOSIS — M10.9 GOUT, UNSPECIFIED: ICD-10-CM

## 2019-05-30 DIAGNOSIS — I82.403 ACUTE EMBOLISM AND THROMBOSIS OF UNSPECIFIED DEEP VEINS OF LOWER EXTREMITY, BILATERAL: ICD-10-CM

## 2019-05-30 DIAGNOSIS — M50.10 CERVICAL DISC DISORDER WITH RADICULOPATHY, UNSPECIFIED CERVICAL REGION: ICD-10-CM

## 2019-05-30 PROCEDURE — 36415 COLL VENOUS BLD VENIPUNCTURE: CPT

## 2019-05-30 PROCEDURE — 99214 OFFICE O/P EST MOD 30 MIN: CPT

## 2019-05-30 NOTE — HISTORY OF PRESENT ILLNESS
[FreeTextEntry1] : 89 year male who was diagnosed with bilateral DVTs. He was admitted to [facility] over the weekend and was discharged on Eliquis. He denies having any bleeding. He notes a desire for PT for his sciatica pain and left shoulder. He is interested in ADL and safe transferring to and out of bed. He denies having any BRBPR or melena

## 2019-05-30 NOTE — PHYSICAL EXAM
[Normal Appearance] : normal appearance [Well Groomed] : well groomed [General Appearance - In No Acute Distress] : no acute distress [Normal Conjunctiva] : the conjunctiva exhibited no abnormalities [] : no respiratory distress [Respiration, Rhythm And Depth] : normal respiratory rhythm and effort [Exaggerated Use Of Accessory Muscles For Inspiration] : no accessory muscle use [Auscultation Breath Sounds / Voice Sounds] : lungs were clear to auscultation bilaterally [Heart Sounds] : normal S1 and S2 [Abdomen Soft] : soft [FreeTextEntry1] : pre-tibial edema but thigh wasting [Skin Turgor] : normal skin turgor [Oriented To Time, Place, And Person] : oriented to person, place, and time [Affect] : the affect was normal [Mood] : the mood was normal [No Anxiety] : not feeling anxious

## 2019-05-31 LAB
BASOPHILS # BLD AUTO: 0.03 K/UL
BASOPHILS NFR BLD AUTO: 0.3 %
EOSINOPHIL # BLD AUTO: 0.07 K/UL
EOSINOPHIL NFR BLD AUTO: 0.7 %
HCT VFR BLD CALC: 30.3 %
HGB BLD-MCNC: 8.8 G/DL
IMM GRANULOCYTES NFR BLD AUTO: 0.3 %
LYMPHOCYTES # BLD AUTO: 5.78 K/UL
LYMPHOCYTES NFR BLD AUTO: 56.7 %
MAN DIFF?: NORMAL
MCHC RBC-ENTMCNC: 28.1 PG
MCHC RBC-ENTMCNC: 29 GM/DL
MCV RBC AUTO: 96.8 FL
MONOCYTES # BLD AUTO: 0.47 K/UL
MONOCYTES NFR BLD AUTO: 4.6 %
NEUTROPHILS # BLD AUTO: 3.81 K/UL
NEUTROPHILS NFR BLD AUTO: 37.4 %
PLATELET # BLD AUTO: 242 K/UL
RBC # BLD: 3.13 M/UL
RBC # FLD: 17.2 %
WBC # FLD AUTO: 10.19 K/UL

## 2019-06-04 ENCOUNTER — MEDICATION RENEWAL (OUTPATIENT)
Age: 84
End: 2019-06-04

## 2019-06-05 ENCOUNTER — MEDICATION RENEWAL (OUTPATIENT)
Age: 84
End: 2019-06-05

## 2019-06-05 ENCOUNTER — CLINICAL ADVICE (OUTPATIENT)
Age: 84
End: 2019-06-05

## 2019-06-10 ENCOUNTER — CLINICAL ADVICE (OUTPATIENT)
Age: 84
End: 2019-06-10

## 2019-06-12 ENCOUNTER — CLINICAL ADVICE (OUTPATIENT)
Age: 84
End: 2019-06-12

## 2019-06-20 ENCOUNTER — APPOINTMENT (OUTPATIENT)
Dept: VASCULAR SURGERY | Facility: CLINIC | Age: 84
End: 2019-06-20
Payer: MEDICARE

## 2019-06-20 PROCEDURE — 99204 OFFICE O/P NEW MOD 45 MIN: CPT

## 2019-06-20 PROCEDURE — 93970 EXTREMITY STUDY: CPT

## 2019-06-20 NOTE — ASSESSMENT
[FreeTextEntry1] : 88 yo M, s/p recent hospitalization and stay at [facility] who developed legs edema and was found to have acute bilateral DVT, on Eliquis returns for a f/u after ED visit. 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 [Arterial/Venous Disease] : arterial/venous disease

## 2019-06-20 NOTE — PHYSICAL EXAM
[Normal Breath Sounds] : Normal breath sounds [JVD] : no jugular venous distention  [Normal Heart Sounds] : normal heart sounds [2+] : left 2+ [Ankle Swelling (On Exam)] : present [Ankle Swelling Bilaterally] : bilaterally  [Varicose Veins Of Lower Extremities] : not present [Ankle Swelling On The Left] : moderate [] : not present [No Rash or Lesion] : No rash or lesion [Abdomen Tenderness] : ~T ~M No abdominal tenderness [Calm] : calm [Alert] : alert [de-identified] : NC/AT [de-identified] : supple [de-identified] : WN/WD, NAD [de-identified] : FROM

## 2019-06-20 NOTE — HISTORY OF PRESENT ILLNESS
[FreeTextEntry1] : 89 yo M w/ hx CLL, PUD , gout, CAD, s/p 2 stents(last in 2010 ),colon polyps presenting to ED after LE USG significant for b/l LE DVT . Pt recently noted swelling and he went to his cardiologist Dr Hodge office who proceeded to do a venous doppler which showed bilateral acute on chronic LE DVT. Patient was sent to ED and started on Eliquis.\par Today he returns for a follow up. He states that his edema subsided, he denies CP, SOB. He ambulates with a walker, denies claudication.

## 2019-06-20 NOTE — PROCEDURE
[FreeTextEntry1] : Venous doppler b/l: R: + thrombus in distal FV, pop vein, chronic DVT in peroneal vein, left: thrombus resolution noted in pop and PT veins.

## 2019-06-20 NOTE — REVIEW OF SYSTEMS
[As noted in HPI] : as noted in HPI [Leg Claudication] : no intermittent leg claudication [Arthralgias] : arthralgias [Lower Ext Edema] : lower extremity edema [As Noted in HPI] : as noted in HPI [Joint Pain] : joint pain [Limb Pain] : limb pain [Limb Swelling] : limb swelling [Negative] : Endocrine

## 2019-06-21 ENCOUNTER — CLINICAL ADVICE (OUTPATIENT)
Age: 84
End: 2019-06-21

## 2019-07-02 ENCOUNTER — RX RENEWAL (OUTPATIENT)
Age: 84
End: 2019-07-02

## 2019-07-02 ENCOUNTER — MEDICATION RENEWAL (OUTPATIENT)
Age: 84
End: 2019-07-02

## 2019-07-12 ENCOUNTER — APPOINTMENT (OUTPATIENT)
Dept: HEART AND VASCULAR | Facility: CLINIC | Age: 84
End: 2019-07-12
Payer: MEDICARE

## 2019-07-12 VITALS
DIASTOLIC BLOOD PRESSURE: 64 MMHG | HEART RATE: 73 BPM | WEIGHT: 128.03 LBS | BODY MASS INDEX: 20.09 KG/M2 | RESPIRATION RATE: 14 BRPM | OXYGEN SATURATION: 98 % | SYSTOLIC BLOOD PRESSURE: 114 MMHG | TEMPERATURE: 97.8 F | HEIGHT: 67 IN

## 2019-07-12 PROCEDURE — 99214 OFFICE O/P EST MOD 30 MIN: CPT

## 2019-07-12 NOTE — HISTORY OF PRESENT ILLNESS
[FreeTextEntry1] : 89 year male who [facility] He notes night time urination while wearing diapers. He is able to hold his urine longer at night. He saw Vascular surgery at [facility]. He notes weight loss.

## 2019-07-12 NOTE — PHYSICAL EXAM
[Normal Conjunctiva] : the conjunctiva exhibited no abnormalities [] : no respiratory distress [Respiration, Rhythm And Depth] : normal respiratory rhythm and effort [Exaggerated Use Of Accessory Muscles For Inspiration] : no accessory muscle use [Auscultation Breath Sounds / Voice Sounds] : lungs were clear to auscultation bilaterally [Heart Sounds] : normal S1 and S2 [Skin Turgor] : normal skin turgor [Oriented To Time, Place, And Person] : oriented to person, place, and time [Affect] : the affect was normal [Mood] : the mood was normal [No Anxiety] : not feeling anxious

## 2019-07-12 NOTE — DISCUSSION/SUMMARY
[FreeTextEntry1] : Nocturia, CLL, DVT, weight loss--Arrangements made to meet Dr Crawford at 3:10PM PM on Thursday July 18. Ensure

## 2019-07-18 ENCOUNTER — APPOINTMENT (OUTPATIENT)
Dept: UROLOGY | Facility: CLINIC | Age: 84
End: 2019-07-18
Payer: MEDICARE

## 2019-07-18 VITALS — HEART RATE: 74 BPM | SYSTOLIC BLOOD PRESSURE: 129 MMHG | TEMPERATURE: 97.6 F | DIASTOLIC BLOOD PRESSURE: 68 MMHG

## 2019-07-18 PROCEDURE — 99204 OFFICE O/P NEW MOD 45 MIN: CPT

## 2019-07-18 NOTE — ASSESSMENT
[FreeTextEntry1] : Nocturia\par BPH\par \par UA/UCx\par Tamsulosin 0.4 mg nightly\par lower extremity elevation and compression stockings\par F/u 3 months

## 2019-07-18 NOTE — PHYSICAL EXAM
[General Appearance - Well Developed] : well developed [Normal Appearance] : normal appearance [Well Groomed] : well groomed [General Appearance - In No Acute Distress] : no acute distress [Respiration, Rhythm And Depth] : normal respiratory rhythm and effort [Exaggerated Use Of Accessory Muscles For Inspiration] : no accessory muscle use [Abdomen Soft] : soft [Abdomen Tenderness] : non-tender [Costovertebral Angle Tenderness] : no ~M costovertebral angle tenderness [Urethral Meatus] : meatus normal [Penis Abnormality] : normal uncircumcised penis [Urinary Bladder Findings] : the bladder was normal on palpation [Scrotum] : the scrotum was normal [Testes Mass (___cm)] : there were no testicular masses [] : no rash [No Focal Deficits] : no focal deficits [Oriented To Time, Place, And Person] : oriented to person, place, and time [Affect] : the affect was normal [Mood] : the mood was normal [Not Anxious] : not anxious [No Palpable Adenopathy] : no palpable adenopathy [FreeTextEntry1] : uses walker

## 2019-07-18 NOTE — REVIEW OF SYSTEMS
[Negative] : Heme/Lymph [Joint Swelling] : joint swelling [see HPI] : see HPI [Lower Ext Edema] : lower extremity edema

## 2019-07-18 NOTE — HISTORY OF PRESENT ILLNESS
[Urinary Incontinence] : urinary incontinence [Urinary Urgency] : urinary urgency [Urinary Frequency] : urinary frequency [Nocturia] : nocturia [None] : None [FreeTextEntry1] : 89 M with hx of kidney stones, HTN, HLD, CAD, CKD (IV), DVT, GERD, CLL, anemia, aortic valve stenosis, thrombocytopenia, cardiac stents and tendonitis.\par Here today for urinary incontinence, frequency and urgency, only at night \par Denies dysuria\par Denies gross hematuria \par Last Ucx at [facility] contaminated \par Past social smoker 50 years ago\par Social alcohol intake\par Denies family history of breast cancer, kidney cancer, bladder cancer or prostate cancer \par \par  [Urinary Retention] : no urinary retention [Straining] : no straining [Weak Stream] : no weak stream [Intermittency] : no intermittency [Post-Void Dribbling] : no post-void dribbling [Dysuria] : no dysuria [Hematuria - Gross] : no gross hematuria [Bladder Spasm] : no bladder spasm [Abdominal Pain] : no abdominal pain [Flank Pain] : no flank pain [Edema] : ~T edema was not present [Fever] : no fever [Fatigue] : no fatigue [Nausea] : no nausea [Anorexia] : no anorexia

## 2019-07-22 ENCOUNTER — APPOINTMENT (OUTPATIENT)
Dept: HEMATOLOGY ONCOLOGY | Facility: CLINIC | Age: 84
End: 2019-07-22
Payer: MEDICARE

## 2019-07-22 VITALS
OXYGEN SATURATION: 100 % | HEART RATE: 76 BPM | BODY MASS INDEX: 19.78 KG/M2 | RESPIRATION RATE: 14 BRPM | WEIGHT: 126 LBS | HEIGHT: 67 IN | TEMPERATURE: 98.2 F | SYSTOLIC BLOOD PRESSURE: 116 MMHG | DIASTOLIC BLOOD PRESSURE: 64 MMHG

## 2019-07-22 PROCEDURE — 36415 COLL VENOUS BLD VENIPUNCTURE: CPT

## 2019-07-22 PROCEDURE — 99214 OFFICE O/P EST MOD 30 MIN: CPT | Mod: 25

## 2019-07-23 LAB
ALBUMIN SERPL ELPH-MCNC: 3.5 G/DL
ALP BLD-CCNC: 64 U/L
ALT SERPL-CCNC: 6 U/L
ANION GAP SERPL CALC-SCNC: 10 MMOL/L
AST SERPL-CCNC: 17 U/L
BASOPHILS # BLD AUTO: 0.03 K/UL
BASOPHILS NFR BLD AUTO: 0.5 %
BILIRUB SERPL-MCNC: 0.3 MG/DL
BUN SERPL-MCNC: 31 MG/DL
CALCIUM SERPL-MCNC: 9.2 MG/DL
CHLORIDE SERPL-SCNC: 104 MMOL/L
CO2 SERPL-SCNC: 25 MMOL/L
CREAT SERPL-MCNC: 1.33 MG/DL
DEPRECATED D DIMER PPP IA-ACNC: 629 NG/ML DDU
EOSINOPHIL # BLD AUTO: 0.05 K/UL
EOSINOPHIL NFR BLD AUTO: 0.9 %
ERYTHROCYTE [SEDIMENTATION RATE] IN BLOOD BY WESTERGREN METHOD: 49 MM/HR
FERRITIN SERPL-MCNC: 582 NG/ML
GLUCOSE SERPL-MCNC: 137 MG/DL
HAPTOGLOB SERPL-MCNC: 204 MG/DL
HCT VFR BLD CALC: 28.8 %
HGB BLD-MCNC: 8.5 G/DL
IMM GRANULOCYTES NFR BLD AUTO: 0.2 %
IRON SATN MFR SERPL: 12 %
IRON SERPL-MCNC: 24 UG/DL
LDH SERPL-CCNC: 278 U/L
LYMPHOCYTES # BLD AUTO: 3.34 K/UL
LYMPHOCYTES NFR BLD AUTO: 58.2 %
MAN DIFF?: NORMAL
MCHC RBC-ENTMCNC: 28.1 PG
MCHC RBC-ENTMCNC: 29.5 GM/DL
MCV RBC AUTO: 95.4 FL
MONOCYTES # BLD AUTO: 0.32 K/UL
MONOCYTES NFR BLD AUTO: 5.6 %
NEUTROPHILS # BLD AUTO: 1.99 K/UL
NEUTROPHILS NFR BLD AUTO: 34.6 %
PLATELET # BLD AUTO: 170 K/UL
POTASSIUM SERPL-SCNC: 5 MMOL/L
PROT SERPL-MCNC: 6.5 G/DL
RBC # BLD: 3.02 M/UL
RBC # FLD: 16.2 %
SODIUM SERPL-SCNC: 139 MMOL/L
TIBC SERPL-MCNC: 200 UG/DL
UIBC SERPL-MCNC: 176 UG/DL
VIT B12 SERPL-MCNC: 846 PG/ML
WBC # FLD AUTO: 5.74 K/UL

## 2019-07-23 NOTE — HISTORY OF PRESENT ILLNESS
[de-identified] : patient with CLL, here for f/u.\par   Most recently patient became more anemic and more thrombocytopenic.\par  [de-identified] : 7- was at [facility] in May 24th 2019 for DVT. released on Eliquis 5 mg BID...prior week he was at [facility] where he was diagnosed with bleeding peptic ulcers...has been taking oral iron daily...

## 2019-07-23 NOTE — ASSESSMENT
[FreeTextEntry1] : 1)Repeat CBC, CMP , pt has BRIGIDA, will arrange for IV Iron.\par \par 2)CLL stable\par \par 3)DVT, D-Dimers still up, reduce Eliquis? to discuss with Dr. Cox.\par \par \par \par \par \par

## 2019-07-23 NOTE — CONSULT LETTER
[Dear  ___] : Dear  [unfilled], [Consult Closing:] : Thank you very much for allowing me to participate in the care of this patient.  If you have any questions, please do not hesitate to contact me. [Consult Letter:] : I had the pleasure of evaluating your patient, [unfilled]. [Please see my note below.] : Please see my note below. [Sincerely,] : Sincerely, [DrJose  ___] : Dr. KUMAR [FreeTextEntry3] : DS

## 2019-07-25 ENCOUNTER — RESULT REVIEW (OUTPATIENT)
Age: 84
End: 2019-07-25

## 2019-07-25 DIAGNOSIS — R31.21 ASYMPTOMATIC MICROSCOPIC HEMATURIA: ICD-10-CM

## 2019-07-25 LAB
APPEARANCE: CLEAR
BACTERIA UR CULT: NORMAL
BACTERIA: NEGATIVE
BILIRUBIN URINE: NEGATIVE
BLOOD URINE: NEGATIVE
COLOR: YELLOW
GLUCOSE QUALITATIVE U: NEGATIVE
HYALINE CASTS: 4 /LPF
KETONES URINE: NEGATIVE
LEUKOCYTE ESTERASE URINE: NEGATIVE
MICROSCOPIC-UA: NORMAL
NITRITE URINE: NEGATIVE
PH URINE: 5
PROTEIN URINE: NORMAL
RED BLOOD CELLS URINE: 5 /HPF
SPECIFIC GRAVITY URINE: 1.02
SQUAMOUS EPITHELIAL CELLS: 1 /HPF
UROBILINOGEN URINE: NORMAL
WHITE BLOOD CELLS URINE: 0 /HPF

## 2019-07-29 ENCOUNTER — MESSAGE (OUTPATIENT)
Age: 84
End: 2019-07-29

## 2019-08-05 ENCOUNTER — APPOINTMENT (OUTPATIENT)
Dept: ULTRASOUND IMAGING | Facility: HOSPITAL | Age: 84
End: 2019-08-05
Payer: MEDICARE

## 2019-08-05 ENCOUNTER — OUTPATIENT (OUTPATIENT)
Dept: OUTPATIENT SERVICES | Facility: HOSPITAL | Age: 84
LOS: 1 days | End: 2019-08-05
Payer: MEDICARE

## 2019-08-05 PROCEDURE — 76770 US EXAM ABDO BACK WALL COMP: CPT | Mod: 26

## 2019-08-05 PROCEDURE — 93970 EXTREMITY STUDY: CPT | Mod: 26

## 2019-08-05 PROCEDURE — 93970 EXTREMITY STUDY: CPT

## 2019-08-05 PROCEDURE — 76770 US EXAM ABDO BACK WALL COMP: CPT

## 2019-08-07 ENCOUNTER — MESSAGE (OUTPATIENT)
Age: 84
End: 2019-08-07

## 2019-08-08 RX ORDER — FERUMOXYTOL 510 MG/17ML
510 INJECTION INTRAVENOUS ONCE
Refills: 0 | Status: COMPLETED | OUTPATIENT
Start: 2019-08-14 | End: 2019-08-14

## 2019-08-09 ENCOUNTER — APPOINTMENT (OUTPATIENT)
Age: 84
End: 2019-08-09

## 2019-08-09 ENCOUNTER — OUTPATIENT (OUTPATIENT)
Dept: OUTPATIENT SERVICES | Facility: HOSPITAL | Age: 84
LOS: 1 days | End: 2019-08-09
Payer: MEDICARE

## 2019-08-09 VITALS
HEART RATE: 69 BPM | HEIGHT: 67 IN | TEMPERATURE: 98 F | DIASTOLIC BLOOD PRESSURE: 44 MMHG | SYSTOLIC BLOOD PRESSURE: 105 MMHG | OXYGEN SATURATION: 98 % | WEIGHT: 125.66 LBS | RESPIRATION RATE: 18 BRPM

## 2019-08-09 VITALS — SYSTOLIC BLOOD PRESSURE: 104 MMHG | HEART RATE: 64 BPM | DIASTOLIC BLOOD PRESSURE: 47 MMHG

## 2019-08-09 DIAGNOSIS — D50.9 IRON DEFICIENCY ANEMIA, UNSPECIFIED: ICD-10-CM

## 2019-08-09 PROCEDURE — 96365 THER/PROPH/DIAG IV INF INIT: CPT

## 2019-08-09 RX ORDER — FERUMOXYTOL 510 MG/17ML
510 INJECTION INTRAVENOUS ONCE
Refills: 0 | Status: COMPLETED | OUTPATIENT
Start: 2019-08-09 | End: 2019-08-09

## 2019-08-09 RX ADMIN — FERUMOXYTOL 117 MILLIGRAM(S): 510 INJECTION INTRAVENOUS at 12:24

## 2019-08-09 RX ADMIN — FERUMOXYTOL 510 MILLIGRAM(S): 510 INJECTION INTRAVENOUS at 13:24

## 2019-08-12 ENCOUNTER — RESULT REVIEW (OUTPATIENT)
Age: 84
End: 2019-08-12

## 2019-08-12 DIAGNOSIS — K90.9 INTESTINAL MALABSORPTION, UNSPECIFIED: ICD-10-CM

## 2019-08-12 DIAGNOSIS — N13.30 UNSPECIFIED HYDRONEPHROSIS: ICD-10-CM

## 2019-08-14 ENCOUNTER — APPOINTMENT (OUTPATIENT)
Age: 84
End: 2019-08-14

## 2019-08-14 ENCOUNTER — OUTPATIENT (OUTPATIENT)
Dept: OUTPATIENT SERVICES | Facility: HOSPITAL | Age: 84
LOS: 1 days | End: 2019-08-14
Payer: MEDICARE

## 2019-08-14 VITALS
DIASTOLIC BLOOD PRESSURE: 56 MMHG | SYSTOLIC BLOOD PRESSURE: 113 MMHG | HEIGHT: 66 IN | TEMPERATURE: 98 F | OXYGEN SATURATION: 96 % | RESPIRATION RATE: 18 BRPM | HEART RATE: 96 BPM | WEIGHT: 149.03 LBS

## 2019-08-14 DIAGNOSIS — D50.9 IRON DEFICIENCY ANEMIA, UNSPECIFIED: ICD-10-CM

## 2019-08-14 PROCEDURE — 96365 THER/PROPH/DIAG IV INF INIT: CPT

## 2019-08-14 RX ADMIN — FERUMOXYTOL 510 MILLIGRAM(S): 510 INJECTION INTRAVENOUS at 13:10

## 2019-08-14 RX ADMIN — FERUMOXYTOL 117 MILLIGRAM(S): 510 INJECTION INTRAVENOUS at 12:10

## 2019-08-15 DIAGNOSIS — K90.9 INTESTINAL MALABSORPTION, UNSPECIFIED: ICD-10-CM

## 2019-08-16 ENCOUNTER — FORM ENCOUNTER (OUTPATIENT)
Age: 84
End: 2019-08-16

## 2019-08-17 ENCOUNTER — OUTPATIENT (OUTPATIENT)
Dept: OUTPATIENT SERVICES | Facility: HOSPITAL | Age: 84
LOS: 1 days | End: 2019-08-17
Payer: MEDICARE

## 2019-08-17 ENCOUNTER — APPOINTMENT (OUTPATIENT)
Dept: CT IMAGING | Facility: HOSPITAL | Age: 84
End: 2019-08-17
Payer: MEDICARE

## 2019-08-17 PROCEDURE — 74176 CT ABD & PELVIS W/O CONTRAST: CPT | Mod: 26

## 2019-08-17 PROCEDURE — 74176 CT ABD & PELVIS W/O CONTRAST: CPT

## 2019-09-05 ENCOUNTER — APPOINTMENT (OUTPATIENT)
Dept: HEMATOLOGY ONCOLOGY | Facility: CLINIC | Age: 84
End: 2019-09-05
Payer: MEDICARE

## 2019-09-05 VITALS
WEIGHT: 131 LBS | SYSTOLIC BLOOD PRESSURE: 136 MMHG | OXYGEN SATURATION: 99 % | RESPIRATION RATE: 14 BRPM | HEART RATE: 69 BPM | HEIGHT: 67 IN | DIASTOLIC BLOOD PRESSURE: 65 MMHG | BODY MASS INDEX: 20.56 KG/M2 | TEMPERATURE: 97.8 F

## 2019-09-05 PROCEDURE — 36415 COLL VENOUS BLD VENIPUNCTURE: CPT

## 2019-09-05 PROCEDURE — 99214 OFFICE O/P EST MOD 30 MIN: CPT | Mod: 25

## 2019-09-05 RX ORDER — ISOPROPYL ALCOHOL 700 MG/ML
LIQUID TOPICAL
Refills: 0 | Status: COMPLETED | COMMUNITY
End: 2019-09-05

## 2019-09-05 RX ORDER — VIT A AND D3 IN COD LIVER OIL 1250-135
1000 CAPSULE ORAL
Refills: 0 | Status: COMPLETED | COMMUNITY
End: 2019-09-05

## 2019-09-06 NOTE — HISTORY OF PRESENT ILLNESS
[de-identified] : Patient with CLL here for follow up  [de-identified] : He is tolerating Eliquis well without any bruising or bleeding. CBC is stable, Hb and platelet is stable. 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 He is scheduled to see Dr. Muniz from urology tomorrow. \par \par He is on iron tablets, tolerating well. \par \par Received IV Iron x2...

## 2019-09-06 NOTE — ASSESSMENT
[FreeTextEntry1] : 1) CLL, asymptomatic\par 2) Right popliteal DVT\par \par -CBC with diff, D-dimers today\par -Continue with Eliquis until end of October \par -RTC in first week of November

## 2019-09-10 ENCOUNTER — APPOINTMENT (OUTPATIENT)
Dept: UROLOGY | Facility: CLINIC | Age: 84
End: 2019-09-10
Payer: MEDICARE

## 2019-09-10 VITALS — TEMPERATURE: 97.8 F | DIASTOLIC BLOOD PRESSURE: 63 MMHG | SYSTOLIC BLOOD PRESSURE: 139 MMHG | HEART RATE: 77 BPM

## 2019-09-10 PROCEDURE — 99213 OFFICE O/P EST LOW 20 MIN: CPT | Mod: 25

## 2019-09-10 PROCEDURE — 51798 US URINE CAPACITY MEASURE: CPT

## 2019-09-10 NOTE — ASSESSMENT
[FreeTextEntry1] : BPH\par UA UCX \par PVR 41\par increase tamsulosin 0.8\par cystoscopy\par f/u 1 month

## 2019-09-10 NOTE — HISTORY OF PRESENT ILLNESS
[FreeTextEntry1] : completed ct scan for microhematuria\par no urothelial lesions noted\par multiple comorbidites\par enuresis has improved\par nocturia 3-4\par reports voiding 600cc at a time (using urinal)\par generally reports improvmeent

## 2019-09-11 LAB
APPEARANCE: CLEAR
BACTERIA: NEGATIVE
BILIRUBIN URINE: NEGATIVE
BLOOD URINE: NORMAL
COLOR: YELLOW
GLUCOSE QUALITATIVE U: NEGATIVE
HYALINE CASTS: 1 /LPF
KETONES URINE: NEGATIVE
LEUKOCYTE ESTERASE URINE: NEGATIVE
MICROSCOPIC-UA: NORMAL
NITRITE URINE: NEGATIVE
PH URINE: 5.5
PROTEIN URINE: NORMAL
RED BLOOD CELLS URINE: 7 /HPF
SPECIFIC GRAVITY URINE: 1.02
SQUAMOUS EPITHELIAL CELLS: 0 /HPF
UROBILINOGEN URINE: NORMAL
WHITE BLOOD CELLS URINE: 1 /HPF

## 2019-09-16 LAB — BACTERIA UR CULT: NORMAL

## 2019-09-19 ENCOUNTER — APPOINTMENT (OUTPATIENT)
Dept: VASCULAR SURGERY | Facility: CLINIC | Age: 84
End: 2019-09-19
Payer: MEDICARE

## 2019-09-19 VITALS — SYSTOLIC BLOOD PRESSURE: 133 MMHG | DIASTOLIC BLOOD PRESSURE: 76 MMHG | OXYGEN SATURATION: 98 % | HEART RATE: 76 BPM

## 2019-09-19 PROCEDURE — 99214 OFFICE O/P EST MOD 30 MIN: CPT

## 2019-09-19 PROCEDURE — 93970 EXTREMITY STUDY: CPT

## 2019-09-23 NOTE — ASSESSMENT
[FreeTextEntry1] : 89 yo M with BLE DVT. Left completely resolved and right, only with DVT in the popliteal vein. He does have some swelling, he can try wearing compression stockings for comfort. Hematologist is planning on discontinuing the Eliquis in October which is reasonable. Will have him return 1-2 weeks after he stops the Eliquis and will rescan him.

## 2019-09-23 NOTE — HISTORY OF PRESENT ILLNESS
[FreeTextEntry1] : 89 yo M w/ hx CLL, PUD , gout, CAD, s/p 2 stents(last in 2010 ),colon polyps presenting to office for FU  b/l LE DVT. Right (distal femoral/pop/peroneal) left leg (pop/PT), left leg DVT resolved. He is currently taking Eliquis twice daily. He is doing well, some mild edema that comes and goes on the right leg. not wearing compression stockings. Has a history of CLL and is monitored by Dr. Oliveira. They are planning on discontinuing the Eliquis in October.

## 2019-09-23 NOTE — PHYSICAL EXAM
[JVD] : no jugular venous distention  [Normal Breath Sounds] : Normal breath sounds [Normal Heart Sounds] : normal heart sounds [2+] : left 2+ [Ankle Swelling (On Exam)] : present [Ankle Swelling On The Right] : mild [Varicose Veins Of Lower Extremities] : not present [] : not present [Abdomen Tenderness] : ~T ~M No abdominal tenderness [No Rash or Lesion] : No rash or lesion [Alert] : alert [Calm] : calm [de-identified] : WN/WD, NAD [de-identified] : NC/AT [de-identified] : supple [de-identified] : FROM

## 2019-09-23 NOTE — REVIEW OF SYSTEMS
[As noted in HPI] : as noted in HPI [Leg Claudication] : no intermittent leg claudication [Lower Ext Edema] : lower extremity edema [As Noted in HPI] : as noted in HPI [Arthralgias] : arthralgias [Joint Pain] : joint pain [Limb Pain] : limb pain [Limb Swelling] : limb swelling [Negative] : Heme/Lymph

## 2019-10-11 ENCOUNTER — APPOINTMENT (OUTPATIENT)
Dept: UROLOGY | Facility: CLINIC | Age: 84
End: 2019-10-11
Payer: MEDICARE

## 2019-10-11 VITALS — SYSTOLIC BLOOD PRESSURE: 147 MMHG | DIASTOLIC BLOOD PRESSURE: 65 MMHG | HEART RATE: 73 BPM | TEMPERATURE: 98 F

## 2019-10-11 LAB
BASOPHILS # BLD AUTO: 0.03 K/UL
BASOPHILS NFR BLD AUTO: 0.5 %
DEPRECATED D DIMER PPP IA-ACNC: 739 NG/ML DDU
EOSINOPHIL # BLD AUTO: 0.14 K/UL
EOSINOPHIL NFR BLD AUTO: 2.2 %
HCT VFR BLD CALC: 31.9 %
HGB BLD-MCNC: 9.8 G/DL
IMM GRANULOCYTES NFR BLD AUTO: 0.3 %
LYMPHOCYTES # BLD AUTO: 4.01 K/UL
LYMPHOCYTES NFR BLD AUTO: 61.8 %
MAN DIFF?: NORMAL
MCHC RBC-ENTMCNC: 29.6 PG
MCHC RBC-ENTMCNC: 30.7 GM/DL
MCV RBC AUTO: 96.4 FL
MONOCYTES # BLD AUTO: 0.36 K/UL
MONOCYTES NFR BLD AUTO: 5.5 %
NEUTROPHILS # BLD AUTO: 1.93 K/UL
NEUTROPHILS NFR BLD AUTO: 29.7 %
PLATELET # BLD AUTO: 125 K/UL
RBC # BLD: 3.31 M/UL
RBC # FLD: 18 %
WBC # FLD AUTO: 6.49 K/UL

## 2019-10-11 PROCEDURE — 51798 US URINE CAPACITY MEASURE: CPT

## 2019-10-11 PROCEDURE — 99213 OFFICE O/P EST LOW 20 MIN: CPT | Mod: 25

## 2019-10-11 NOTE — ASSESSMENT
[FreeTextEntry1] : BPH \par PVR 66 today\par will hold off on cysto given comorbidities\par continue flomax 0.8\par f/u 6 months

## 2019-10-11 NOTE — HISTORY OF PRESENT ILLNESS
[FreeTextEntry1] : flomax 0.8\par improving symptoms \par IPSS 11\par significant comorbidities\par nocturnal enuresis has resolved \par now nocturia x 3\par hx microhematuria \par negative CT scan\par no cysto

## 2019-10-18 ENCOUNTER — RX RENEWAL (OUTPATIENT)
Age: 84
End: 2019-10-18

## 2019-11-05 ENCOUNTER — APPOINTMENT (OUTPATIENT)
Dept: HEMATOLOGY ONCOLOGY | Facility: CLINIC | Age: 84
End: 2019-11-05
Payer: MEDICARE

## 2019-11-05 ENCOUNTER — LABORATORY RESULT (OUTPATIENT)
Age: 84
End: 2019-11-05

## 2019-11-05 VITALS
BODY MASS INDEX: 21.97 KG/M2 | SYSTOLIC BLOOD PRESSURE: 139 MMHG | HEART RATE: 67 BPM | DIASTOLIC BLOOD PRESSURE: 62 MMHG | RESPIRATION RATE: 14 BRPM | TEMPERATURE: 97.7 F | OXYGEN SATURATION: 99 % | HEIGHT: 67 IN | WEIGHT: 140 LBS

## 2019-11-05 PROCEDURE — 99214 OFFICE O/P EST MOD 30 MIN: CPT | Mod: 25

## 2019-11-05 PROCEDURE — 36415 COLL VENOUS BLD VENIPUNCTURE: CPT

## 2019-11-07 LAB
25(OH)D3 SERPL-MCNC: 59.7 NG/ML
ALBUMIN SERPL ELPH-MCNC: 3.9 G/DL
ALP BLD-CCNC: 66 U/L
ALT SERPL-CCNC: 9 U/L
ANION GAP SERPL CALC-SCNC: 12 MMOL/L
AST SERPL-CCNC: 17 U/L
BASOPHILS # BLD AUTO: 0.04 K/UL
BASOPHILS NFR BLD AUTO: 0.7 %
BILIRUB SERPL-MCNC: 0.4 MG/DL
BUN SERPL-MCNC: 32 MG/DL
CALCIUM SERPL-MCNC: 8.9 MG/DL
CHLORIDE SERPL-SCNC: 106 MMOL/L
CO2 SERPL-SCNC: 24 MMOL/L
CREAT SERPL-MCNC: 1.35 MG/DL
EOSINOPHIL # BLD AUTO: 0.08 K/UL
EOSINOPHIL NFR BLD AUTO: 1.3 %
ERYTHROCYTE [SEDIMENTATION RATE] IN BLOOD BY WESTERGREN METHOD: 14 MM/HR
FERRITIN SERPL-MCNC: 601 NG/ML
GLUCOSE SERPL-MCNC: 83 MG/DL
HAPTOGLOB SERPL-MCNC: <20 MG/DL
HCT VFR BLD CALC: 30.1 %
HGB BLD-MCNC: 9.8 G/DL
IMM GRANULOCYTES NFR BLD AUTO: 0.2 %
IRON SATN MFR SERPL: 29 %
IRON SERPL-MCNC: 56 UG/DL
LDH SERPL-CCNC: 234 U/L
LYMPHOCYTES # BLD AUTO: 3.79 K/UL
LYMPHOCYTES NFR BLD AUTO: 61.8 %
MAN DIFF?: NORMAL
MCHC RBC-ENTMCNC: 31.7 PG
MCHC RBC-ENTMCNC: 32.6 GM/DL
MCV RBC AUTO: 97.4 FL
MONOCYTES # BLD AUTO: 0.34 K/UL
MONOCYTES NFR BLD AUTO: 5.5 %
NEUTROPHILS # BLD AUTO: 1.87 K/UL
NEUTROPHILS NFR BLD AUTO: 30.5 %
PLATELET # BLD AUTO: 88 K/UL
POTASSIUM SERPL-SCNC: 4.8 MMOL/L
PROT SERPL-MCNC: 6.1 G/DL
RBC # BLD: 3.09 M/UL
RBC # FLD: 15.6 %
SODIUM SERPL-SCNC: 142 MMOL/L
TIBC SERPL-MCNC: 193 UG/DL
UIBC SERPL-MCNC: 137 UG/DL
VIT B12 SERPL-MCNC: 514 PG/ML
WBC # FLD AUTO: 6.13 K/UL

## 2019-11-07 NOTE — HISTORY OF PRESENT ILLNESS
[de-identified] : Patient with CLL here for follow up  [de-identified] : He is tolerating Eliquis well without any bruising or bleeding. CBC is stable, Hb and platelet is stable. 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 He is scheduled to see Dr. Muniz from urology tomorrow. \par \par He is on iron tablets, tolerating well. \par \par Received IV Iron x2...\par \par 11-5-2019 getting pains in his feet when he takes the support stockings off...

## 2019-11-07 NOTE — ASSESSMENT
[FreeTextEntry1] : 1) CLL, asymptomatic, thrombocytopenic now...\par \par 2) Right popliteal DVT off Eliquis now..\par \par -Repeat CBC with Dr. Chairez.\par \par -RTC in January

## 2019-11-07 NOTE — CONSULT LETTER
[Dear  ___] : Dear  [unfilled], [Consult Letter:] : I had the pleasure of evaluating your patient, [unfilled]. [Please see my note below.] : Please see my note below. [Consult Closing:] : Thank you very much for allowing me to participate in the care of this patient.  If you have any questions, please do not hesitate to contact me. [Sincerely,] : Sincerely, [FreeTextEntry3] : Brandy Oliveira MD\par

## 2019-11-14 ENCOUNTER — LABORATORY RESULT (OUTPATIENT)
Age: 84
End: 2019-11-14

## 2019-11-14 ENCOUNTER — APPOINTMENT (OUTPATIENT)
Dept: VASCULAR SURGERY | Facility: CLINIC | Age: 84
End: 2019-11-14
Payer: MEDICARE

## 2019-11-14 PROCEDURE — 99214 OFFICE O/P EST MOD 30 MIN: CPT

## 2019-11-14 PROCEDURE — 93971 EXTREMITY STUDY: CPT

## 2019-11-15 RX ORDER — APIXABAN 2.5 MG/1
2.5 TABLET, FILM COATED ORAL
Qty: 180 | Refills: 3 | Status: DISCONTINUED | COMMUNITY
Start: 2019-05-28 | End: 2019-11-15

## 2019-11-15 NOTE — ASSESSMENT
[FreeTextEntry1] : 89 yo M with BLE DVT. Left completely resolved and right, only with DVT in the popliteal vein. 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 Overall doing well, Continue to FU with Dr. Oliveira.

## 2019-11-15 NOTE — REVIEW OF SYSTEMS
[Leg Claudication] : no intermittent leg claudication [As noted in HPI] : as noted in HPI [As Noted in HPI] : as noted in HPI [Lower Ext Edema] : lower extremity edema [Arthralgias] : arthralgias [Joint Pain] : joint pain [Limb Pain] : limb pain [Limb Swelling] : limb swelling [Negative] : Endocrine

## 2019-11-15 NOTE — HISTORY OF PRESENT ILLNESS
[FreeTextEntry1] : 89 yo M w/ hx CLL, PUD , gout, CAD, s/p 2 stents(last in 2010 ),colon polyps presenting to office for FU  b/l LE DVT. Right (distal femoral/pop/peroneal) left leg (pop/PT), left leg DVT resolved. He is currently taking Eliquis twice daily. He is doing well, some mild edema that comes and goes on the right leg. Wearing zip up compression stockings from [employer] which he states is really helping the the edema. He stopped his Eliquis early November

## 2019-11-15 NOTE — PHYSICAL EXAM
[Normal Breath Sounds] : Normal breath sounds [JVD] : no jugular venous distention  [Normal Heart Sounds] : normal heart sounds [2+] : right 2+ [Ankle Swelling Bilaterally] : bilaterally  [Ankle Swelling On The Right] : mild [Ankle Swelling (On Exam)] : not present [] : not present [Varicose Veins Of Lower Extremities] : not present [Alert] : alert [No Rash or Lesion] : No rash or lesion [Abdomen Tenderness] : ~T ~M No abdominal tenderness [Calm] : calm [de-identified] : NC/AT [de-identified] : WN/WD, NAD [de-identified] : FROM

## 2019-11-23 LAB
BASOPHILS # BLD AUTO: 0.06 K/UL
BASOPHILS NFR BLD AUTO: 0.8 %
EOSINOPHIL # BLD AUTO: 0.08 K/UL
EOSINOPHIL NFR BLD AUTO: 1 %
HCT VFR BLD CALC: 33.5 %
HGB BLD-MCNC: 11.2 G/DL
IMM GRANULOCYTES NFR BLD AUTO: 0.1 %
LYMPHOCYTES # BLD AUTO: 5.11 K/UL
LYMPHOCYTES NFR BLD AUTO: 64.9 %
MAN DIFF?: NORMAL
MCHC RBC-ENTMCNC: 32.4 PG
MCHC RBC-ENTMCNC: 33.4 GM/DL
MCV RBC AUTO: 96.8 FL
MONOCYTES # BLD AUTO: 0.41 K/UL
MONOCYTES NFR BLD AUTO: 5.2 %
NEUTROPHILS # BLD AUTO: 2.2 K/UL
NEUTROPHILS NFR BLD AUTO: 28 %
PLATELET # BLD AUTO: 90 K/UL
RBC # BLD: 3.46 M/UL
RBC # FLD: 15.5 %
WBC # FLD AUTO: 7.87 K/UL

## 2019-12-05 ENCOUNTER — APPOINTMENT (OUTPATIENT)
Dept: HEART AND VASCULAR | Facility: CLINIC | Age: 84
End: 2019-12-05
Payer: MEDICARE

## 2019-12-05 VITALS
BODY MASS INDEX: 21.97 KG/M2 | HEIGHT: 67 IN | WEIGHT: 140 LBS | SYSTOLIC BLOOD PRESSURE: 154 MMHG | RESPIRATION RATE: 14 BRPM | DIASTOLIC BLOOD PRESSURE: 70 MMHG | TEMPERATURE: 97.6 F | HEART RATE: 67 BPM | OXYGEN SATURATION: 97 %

## 2019-12-05 VITALS — DIASTOLIC BLOOD PRESSURE: 80 MMHG | SYSTOLIC BLOOD PRESSURE: 134 MMHG

## 2019-12-05 PROCEDURE — 93000 ELECTROCARDIOGRAM COMPLETE: CPT

## 2019-12-05 PROCEDURE — 99214 OFFICE O/P EST MOD 30 MIN: CPT

## 2019-12-05 NOTE — REVIEW OF SYSTEMS
[Eyeglasses] : currently wearing eyeglasses [Dyspnea on exertion] : dyspnea during exertion [Cough] : cough [Negative] : Gastrointestinal [Shortness Of Breath] : no shortness of breath [Leg Claudication] : no intermittent leg claudication [Chest  Pressure] : no chest pressure [Chest Pain] : no chest pain [Palpitations] : no palpitations

## 2019-12-05 NOTE — DISCUSSION/SUMMARY
[FreeTextEntry1] : AS, MR, pulmonary hypertension--I asked him to return in February for Echocardiogram

## 2019-12-05 NOTE — PHYSICAL EXAM
[General Appearance - Well Developed] : well developed [General Appearance - Well Nourished] : well nourished [Well Groomed] : well groomed [Normal Appearance] : normal appearance [General Appearance - In No Acute Distress] : no acute distress [No Deformities] : no deformities [Normal Conjunctiva] : the conjunctiva exhibited no abnormalities [Respiration, Rhythm And Depth] : normal respiratory rhythm and effort [Exaggerated Use Of Accessory Muscles For Inspiration] : no accessory muscle use [] : no respiratory distress [Abnormal Walk] : normal gait [Heart Sounds] : normal S1 and S2 [Auscultation Breath Sounds / Voice Sounds] : lungs were clear to auscultation bilaterally [Affect] : the affect was normal [Skin Turgor] : normal skin turgor [FreeTextEntry1] : no edema [Oriented To Time, Place, And Person] : oriented to person, place, and time [Mood] : the mood was normal [No Anxiety] : not feeling anxious

## 2019-12-05 NOTE — HISTORY OF PRESENT ILLNESS
[FreeTextEntry1] : 89 year male who with DVT who reports wearing his compression socks. He is off of Lisinopril for months. He notes plans to see Dr Oliveira and Dr Cox in followup. No chest pain, SOB, palpitations, PND or orthopnea. He is troubled by arthritis and is going for PT. He has a morning cough due to allergies

## 2019-12-18 ENCOUNTER — LABORATORY RESULT (OUTPATIENT)
Age: 84
End: 2019-12-18

## 2019-12-19 ENCOUNTER — APPOINTMENT (OUTPATIENT)
Dept: INTERNAL MEDICINE | Facility: CLINIC | Age: 84
End: 2019-12-19
Payer: MEDICARE

## 2019-12-19 VITALS
SYSTOLIC BLOOD PRESSURE: 140 MMHG | OXYGEN SATURATION: 97 % | WEIGHT: 139 LBS | BODY MASS INDEX: 21.77 KG/M2 | HEART RATE: 71 BPM | DIASTOLIC BLOOD PRESSURE: 80 MMHG | TEMPERATURE: 97.7 F

## 2019-12-19 PROCEDURE — 36415 COLL VENOUS BLD VENIPUNCTURE: CPT

## 2019-12-19 PROCEDURE — 99397 PER PM REEVAL EST PAT 65+ YR: CPT

## 2019-12-19 NOTE — PLAN
[FreeTextEntry1] : \par \par BPH  -rec trial of alfuzosin instead of tamsulosin and monitor\par  - encourage f/up with Dr Hoyos\par \par CLL - f/up with Dr Oliveira\par \par CKD - check labs today\par \par CAD - routine f/up with Dr holland\par \par

## 2019-12-19 NOTE — PHYSICAL EXAM
[Normal Rate] : normal rate  [Regular Rhythm] : with a regular rhythm [No Edema] : there was no peripheral edema [de-identified] : slow gait [Normal] : affect was normal and insight and judgment were intact

## 2019-12-19 NOTE — HISTORY OF PRESENT ILLNESS
[FreeTextEntry1] : wellness [de-identified] : 89 yo M w/ hx CLL, DVT, PUD , gout, CAD, s/p 2 stents(last in 2010 ),colon polyps \par Getting PT for his neck and shoulder\par Wearing compression socks\par Planning on going to [facility] in February\par Flu shot this year\par

## 2019-12-20 LAB
ALBUMIN SERPL ELPH-MCNC: 4.6 G/DL
ALP BLD-CCNC: 78 U/L
ALT SERPL-CCNC: 9 U/L
ANION GAP SERPL CALC-SCNC: 14 MMOL/L
AST SERPL-CCNC: 18 U/L
BASOPHILS # BLD AUTO: 0.04 K/UL
BASOPHILS NFR BLD AUTO: 0.6 %
BILIRUB SERPL-MCNC: 0.5 MG/DL
BUN SERPL-MCNC: 30 MG/DL
CALCIUM SERPL-MCNC: 9.3 MG/DL
CHLORIDE SERPL-SCNC: 106 MMOL/L
CHOLEST SERPL-MCNC: 166 MG/DL
CHOLEST/HDLC SERPL: 4.3 RATIO
CO2 SERPL-SCNC: 25 MMOL/L
CREAT SERPL-MCNC: 1.25 MG/DL
EOSINOPHIL # BLD AUTO: 0.07 K/UL
EOSINOPHIL NFR BLD AUTO: 1 %
ESTIMATED AVERAGE GLUCOSE: 97 MG/DL
GLUCOSE SERPL-MCNC: 85 MG/DL
HBA1C MFR BLD HPLC: 5 %
HCT VFR BLD CALC: 37.1 %
HDLC SERPL-MCNC: 39 MG/DL
HGB BLD-MCNC: 11.7 G/DL
IMM GRANULOCYTES NFR BLD AUTO: 0.3 %
LDLC SERPL CALC-MCNC: 97 MG/DL
LYMPHOCYTES # BLD AUTO: 4.48 K/UL
LYMPHOCYTES NFR BLD AUTO: 62.7 %
MAN DIFF?: NORMAL
MCHC RBC-ENTMCNC: 31.5 GM/DL
MCHC RBC-ENTMCNC: 32.1 PG
MCV RBC AUTO: 101.9 FL
MONOCYTES # BLD AUTO: 0.42 K/UL
MONOCYTES NFR BLD AUTO: 5.9 %
NEUTROPHILS # BLD AUTO: 2.11 K/UL
NEUTROPHILS NFR BLD AUTO: 29.5 %
PLATELET # BLD AUTO: 87 K/UL
POTASSIUM SERPL-SCNC: 4.6 MMOL/L
PROT SERPL-MCNC: 6.9 G/DL
RBC # BLD: 3.64 M/UL
RBC # FLD: 14.9 %
SODIUM SERPL-SCNC: 145 MMOL/L
TRIGL SERPL-MCNC: 150 MG/DL
VIT B12 SERPL-MCNC: 629 PG/ML
WBC # FLD AUTO: 7.14 K/UL

## 2020-01-17 ENCOUNTER — APPOINTMENT (OUTPATIENT)
Dept: UROLOGY | Facility: CLINIC | Age: 85
End: 2020-01-17
Payer: MEDICARE

## 2020-01-17 VITALS — DIASTOLIC BLOOD PRESSURE: 68 MMHG | HEART RATE: 82 BPM | SYSTOLIC BLOOD PRESSURE: 169 MMHG | TEMPERATURE: 97.9 F

## 2020-01-17 PROCEDURE — 99213 OFFICE O/P EST LOW 20 MIN: CPT | Mod: 25

## 2020-01-17 PROCEDURE — 51798 US URINE CAPACITY MEASURE: CPT

## 2020-01-17 RX ORDER — TAMSULOSIN HYDROCHLORIDE 0.4 MG/1
0.4 CAPSULE ORAL
Qty: 180 | Refills: 3 | Status: DISCONTINUED | COMMUNITY
Start: 2019-07-18 | End: 2020-01-17

## 2020-01-17 NOTE — DISCHARGE NOTE PROVIDER - [id number]_NEU_ALL_CORE
No Detail Level: Detailed Quality 137: Melanoma: Continuity Of Care - Recall System: Patient information entered into a recall system that includes: target date for the next exam specified AND a process to follow up with patients regarding missed or unscheduled appointments Detail Level: Generalized Detail Level: Simple

## 2020-01-17 NOTE — ASSESSMENT
[FreeTextEntry1] : BPH\par resume to alfuzosin\par Im hesitant to start anticholinergic in a 89 year old\par will start finasteride\par PVR 0

## 2020-01-17 NOTE — HISTORY OF PRESENT ILLNESS
[FreeTextEntry1] : eprsistent bothersome LUTS and urgency despite flomax 0.8\par was given rx for alfuzosin\par PVR 0

## 2020-01-28 ENCOUNTER — APPOINTMENT (OUTPATIENT)
Dept: HEMATOLOGY ONCOLOGY | Facility: CLINIC | Age: 85
End: 2020-01-28
Payer: MEDICARE

## 2020-01-28 ENCOUNTER — RX RENEWAL (OUTPATIENT)
Age: 85
End: 2020-01-28

## 2020-01-28 VITALS
SYSTOLIC BLOOD PRESSURE: 139 MMHG | RESPIRATION RATE: 14 BRPM | TEMPERATURE: 97.7 F | OXYGEN SATURATION: 98 % | DIASTOLIC BLOOD PRESSURE: 69 MMHG | BODY MASS INDEX: 21.82 KG/M2 | HEART RATE: 67 BPM | HEIGHT: 67 IN | WEIGHT: 139 LBS

## 2020-01-28 PROCEDURE — 99214 OFFICE O/P EST MOD 30 MIN: CPT

## 2020-01-28 NOTE — CONSULT LETTER
[Consult Letter:] : I had the pleasure of evaluating your patient, [unfilled]. [Dear  ___] : Dear  [unfilled], [Please see my note below.] : Please see my note below. [Sincerely,] : Sincerely, [Consult Closing:] : Thank you very much for allowing me to participate in the care of this patient.  If you have any questions, please do not hesitate to contact me. [FreeTextEntry3] : Brandy Oliveira MD\par

## 2020-01-28 NOTE — HISTORY OF PRESENT ILLNESS
[de-identified] : Patient with CLL here for follow up  [de-identified] : He is tolerating Eliquis well without any bruising or bleeding. CBC is stable, Hb and platelet is stable. 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 He is scheduled to see Dr. Muniz from urology tomorrow. \par \par He is on iron tablets, tolerating well. \par \par Received IV Iron x2...\par \par 11-5-2019 getting pains in his feet when he takes the support stockings off...\par \par 1/28/2020 pt returns for f/u...seeing multiple docs...[facility], even so his total cholesterol is 164 by their results (which are scanned in...) was also told to go back on Aspirin, but pt has low plt, and had 2 bleeding ulcers...

## 2020-01-28 NOTE — REASON FOR VISIT
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 Curiously, she had odd sensations in her mouth which PRECEDED the diagnosis of breast cancer. These sensations were atypical for olfactory seizures in that she had them after eating very hot soup or hot pizza and the sensation was a persistence of the odd burning feeling. It happened innumerable times until she received Taxol chemotherapy. She had a bad allergic reaction to the Taxol and the oral sensations went away. More recently, she was switched to 5FU and these sensations have somewhat returned. She reports trying brushing her teeth, using lots of mouthwash, and other manouvers to resolve the sensations, but nothing seems to help. She has noticed that when she is hyperglycemic, her mouth becomes dry and the sensations re-appear.\par \par There are no complaints of weakness, visual change, alfonso seizure or other seizure-like activity.\par  [Follow-Up Visit] : a follow-up visit for

## 2020-02-10 ENCOUNTER — APPOINTMENT (OUTPATIENT)
Dept: HEART AND VASCULAR | Facility: CLINIC | Age: 85
End: 2020-02-10
Payer: MEDICARE

## 2020-02-10 VITALS
WEIGHT: 140 LBS | OXYGEN SATURATION: 96 % | BODY MASS INDEX: 21.97 KG/M2 | SYSTOLIC BLOOD PRESSURE: 134 MMHG | RESPIRATION RATE: 14 BRPM | TEMPERATURE: 97.2 F | HEIGHT: 67 IN | HEART RATE: 68 BPM | DIASTOLIC BLOOD PRESSURE: 60 MMHG

## 2020-02-10 PROCEDURE — 99212 OFFICE O/P EST SF 10 MIN: CPT

## 2020-02-10 PROCEDURE — 93306 TTE W/DOPPLER COMPLETE: CPT

## 2020-02-10 NOTE — DISCUSSION/SUMMARY
[FreeTextEntry1] : At the time of the patient's visit an Echocardiogram was performed to evaluate LV function. At the time of the visit the results were reviewed with patient \par \par I informed the patient that I did not find a Cardiovascular contraindication to travel to [facility] at this time

## 2020-02-10 NOTE — HISTORY OF PRESENT ILLNESS
[FreeTextEntry1] : 89 year male who comes for an Echocardiogram. He is planning to travel to [facility]

## 2020-02-10 NOTE — PHYSICAL EXAM
[Normal Appearance] : normal appearance [General Appearance - Well Developed] : well developed [Well Groomed] : well groomed [General Appearance - Well Nourished] : well nourished [No Deformities] : no deformities [General Appearance - In No Acute Distress] : no acute distress [Normal Conjunctiva] : the conjunctiva exhibited no abnormalities [] : no respiratory distress [Respiration, Rhythm And Depth] : normal respiratory rhythm and effort [Exaggerated Use Of Accessory Muscles For Inspiration] : no accessory muscle use [Abnormal Walk] : normal gait [Skin Turgor] : normal skin turgor [Oriented To Time, Place, And Person] : oriented to person, place, and time [Affect] : the affect was normal [Mood] : the mood was normal [No Anxiety] : not feeling anxious

## 2020-05-05 ENCOUNTER — APPOINTMENT (OUTPATIENT)
Dept: HEMATOLOGY ONCOLOGY | Facility: CLINIC | Age: 85
End: 2020-05-05
Payer: MEDICARE

## 2020-05-05 PROCEDURE — 99443: CPT

## 2020-05-12 ENCOUNTER — LABORATORY RESULT (OUTPATIENT)
Age: 85
End: 2020-05-12

## 2020-05-14 LAB
25(OH)D3 SERPL-MCNC: 63.7 NG/ML
ALBUMIN MFR SERPL ELPH: 65.2 %
ALBUMIN SERPL ELPH-MCNC: 4.4 G/DL
ALBUMIN SERPL-MCNC: 4 G/DL
ALBUMIN/GLOB SERPL: 1.8 RATIO
ALP BLD-CCNC: 68 U/L
ALPHA1 GLOB MFR SERPL ELPH: 4.5 %
ALPHA1 GLOB SERPL ELPH-MCNC: 0.3 G/DL
ALPHA2 GLOB MFR SERPL ELPH: 8.4 %
ALPHA2 GLOB SERPL ELPH-MCNC: 0.5 G/DL
ALT SERPL-CCNC: 9 U/L
ANION GAP SERPL CALC-SCNC: 13 MMOL/L
AST SERPL-CCNC: 13 U/L
B-GLOBULIN MFR SERPL ELPH: 8.8 %
B-GLOBULIN SERPL ELPH-MCNC: 0.5 G/DL
B2 MICROGLOB SERPL-MCNC: 6.3 MG/L
BASOPHILS # BLD AUTO: 0.09 K/UL
BASOPHILS NFR BLD AUTO: 1 %
BILIRUB SERPL-MCNC: 0.4 MG/DL
BUN SERPL-MCNC: 38 MG/DL
CALCIUM SERPL-MCNC: 9.2 MG/DL
CHLORIDE SERPL-SCNC: 104 MMOL/L
CO2 SERPL-SCNC: 24 MMOL/L
CREAT SERPL-MCNC: 1.67 MG/DL
DEPRECATED KAPPA LC FREE/LAMBDA SER: 0.36 RATIO
EOSINOPHIL # BLD AUTO: 0.27 K/UL
EOSINOPHIL NFR BLD AUTO: 3 %
ERYTHROCYTE [SEDIMENTATION RATE] IN BLOOD BY WESTERGREN METHOD: 8 MM/HR
GAMMA GLOB FLD ELPH-MCNC: 0.8 G/DL
GAMMA GLOB MFR SERPL ELPH: 13.1 %
GLUCOSE SERPL-MCNC: 109 MG/DL
HCT VFR BLD CALC: 33.1 %
HGB BLD-MCNC: 11.4 G/DL
IGA SER QL IEP: 92 MG/DL
IGG SER QL IEP: 857 MG/DL
IGM SER QL IEP: 40 MG/DL
INTERPRETATION SERPL IEP-IMP: NORMAL
KAPPA LC CSF-MCNC: 9.57 MG/DL
KAPPA LC SERPL-MCNC: 3.46 MG/DL
LYMPHOCYTES # BLD AUTO: 6.88 K/UL
LYMPHOCYTES NFR BLD AUTO: 76 %
M PROTEIN SPEC IFE-MCNC: NORMAL
MAN DIFF?: YES
MCHC RBC-ENTMCNC: 32.8 PG
MCHC RBC-ENTMCNC: 34.4 GM/DL
MCV RBC AUTO: 95.1 FL
MONOCYTES # BLD AUTO: 0.72 K/UL
MONOCYTES NFR BLD AUTO: 8 %
NEUTROPHILS # BLD AUTO: 0.91 K/UL
NEUTROPHILS NFR BLD AUTO: 10 %
PLATELET # BLD AUTO: 87 K/UL
POTASSIUM SERPL-SCNC: 4.9 MMOL/L
PROT SERPL-MCNC: 6.2 G/DL
RBC # BLD: 3.48 M/UL
RBC # FLD: 14.1 %
SODIUM SERPL-SCNC: 141 MMOL/L
WBC # FLD AUTO: 9.05 K/UL

## 2020-05-19 ENCOUNTER — APPOINTMENT (OUTPATIENT)
Dept: HEMATOLOGY ONCOLOGY | Facility: CLINIC | Age: 85
End: 2020-05-19
Payer: MEDICARE

## 2020-05-19 PROCEDURE — 99214 OFFICE O/P EST MOD 30 MIN: CPT

## 2020-05-19 NOTE — HISTORY OF PRESENT ILLNESS
[de-identified] : Patient with CLL here for follow up  [de-identified] : He is tolerating Eliquis well without any bruising or bleeding. CBC is stable, Hb and platelet is stable. 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 He is scheduled to see Dr. Muniz from urology tomorrow. \par \par He is on iron tablets, tolerating well. \par \par Received IV Iron x2...\par \par 11-5-2019 getting pains in his feet when he takes the support stockings off...\par \par May 19, 2020 patient requested a follow-up appointment since he had blood work done last week and he wanted to discuss his results\par \par 1/28/2020 pt returns for f/u...seeing multiple docs...[facility], even so his total cholesterol is 164 by their results (which are scanned in...) was also told to go back on Aspirin, but pt has low plt, and had 2 bleeding ulcers...

## 2020-05-19 NOTE — ASSESSMENT
[FreeTextEntry1] : 1) CLL, asymptomatic, thrombocytopenic now... But feeling fine...\par \par 2) Right popliteal DVT remains  off Eliquis now..\par \par \par \par -RTC in August

## 2020-06-02 ENCOUNTER — APPOINTMENT (OUTPATIENT)
Dept: HEART AND VASCULAR | Facility: CLINIC | Age: 85
End: 2020-06-02
Payer: MEDICARE

## 2020-06-02 PROCEDURE — 99441: CPT

## 2020-07-23 ENCOUNTER — RX RENEWAL (OUTPATIENT)
Age: 85
End: 2020-07-23

## 2020-07-23 ENCOUNTER — APPOINTMENT (OUTPATIENT)
Dept: UROLOGY | Facility: CLINIC | Age: 85
End: 2020-07-23
Payer: MEDICARE

## 2020-07-23 ENCOUNTER — APPOINTMENT (OUTPATIENT)
Dept: VASCULAR SURGERY | Facility: CLINIC | Age: 85
End: 2020-07-23
Payer: MEDICARE

## 2020-07-23 VITALS — TEMPERATURE: 98.1 F | DIASTOLIC BLOOD PRESSURE: 79 MMHG | SYSTOLIC BLOOD PRESSURE: 159 MMHG

## 2020-07-23 PROCEDURE — 93880 EXTRACRANIAL BILAT STUDY: CPT

## 2020-07-23 PROCEDURE — 99213 OFFICE O/P EST LOW 20 MIN: CPT | Mod: 25

## 2020-07-23 PROCEDURE — 51798 US URINE CAPACITY MEASURE: CPT

## 2020-07-23 PROCEDURE — 99214 OFFICE O/P EST MOD 30 MIN: CPT

## 2020-07-24 NOTE — REVIEW OF SYSTEMS
[As noted in HPI] : as noted in HPI [Lower Ext Edema] : lower extremity edema [As Noted in HPI] : as noted in HPI [Arthralgias] : arthralgias [Joint Pain] : joint pain [Limb Pain] : limb pain [Limb Swelling] : limb swelling [Negative] : Heme/Lymph [Leg Claudication] : no intermittent leg claudication

## 2020-07-24 NOTE — ASSESSMENT
[FreeTextEntry1] : 89 yo M with BLE DVT. Left completely resolved and right, only with chronic DVT in the popliteal vein no increase in pain or swelling, wearing compression stockings. \par \par Would like to transfer care for his carotid to our office. US was done today showing L /60. Stable, will continue to monitor every 6 months, not taking aspirin because of stomach ulcers, not taking statin per patient because of his liver, will discuss with Dr. Hodge.

## 2020-07-24 NOTE — HISTORY OF PRESENT ILLNESS
[FreeTextEntry1] : 89 yo M w/ hx CLL, PUD , gout, CAD, s/p 2 stents(last in 2010 ),colon polyps presenting to office for FU  b/l LE DVT. Right (distal femoral/pop/peroneal) left leg (pop/PT), left leg DVT resolved. Wearing compressions tockings, no issues. \par \par He is here to FU for his carotid US. he was seen by Dr. Douglass, last US was done on 1/9/2020, left /44, right /21. he would like us to follow the carotid disease from now on to consolidate his care.

## 2020-07-24 NOTE — PHYSICAL EXAM
[Normal Breath Sounds] : Normal breath sounds [Normal Heart Sounds] : normal heart sounds [2+] : right 2+ [Ankle Swelling On The Right] : mild [Ankle Swelling Bilaterally] : bilaterally  [No Rash or Lesion] : No rash or lesion [Alert] : alert [Calm] : calm [JVD] : no jugular venous distention  [] : not present [Varicose Veins Of Lower Extremities] : not present [Ankle Swelling (On Exam)] : not present [de-identified] : NC/AT [Abdomen Tenderness] : ~T ~M No abdominal tenderness [de-identified] : WN/WD, NAD [de-identified] : FROM

## 2020-07-27 NOTE — PHYSICAL EXAM
[General Appearance - Well Developed] : well developed [General Appearance - Well Nourished] : well nourished [Normal Appearance] : normal appearance [Well Groomed] : well groomed [General Appearance - In No Acute Distress] : no acute distress [Abdomen Soft] : soft [Abdomen Tenderness] : non-tender [Costovertebral Angle Tenderness] : no ~M costovertebral angle tenderness [Urinary Bladder Findings] : the bladder was normal on palpation [Respiration, Rhythm And Depth] : normal respiratory rhythm and effort [] : no respiratory distress [Exaggerated Use Of Accessory Muscles For Inspiration] : no accessory muscle use [Oriented To Time, Place, And Person] : oriented to person, place, and time [Affect] : the affect was normal [Mood] : the mood was normal [Not Anxious] : not anxious [FreeTextEntry1] : uses a cane

## 2020-07-27 NOTE — HISTORY OF PRESENT ILLNESS
[FreeTextEntry1] : Still experiencing frequency and urgency despite taking Alfuzosin and Finasteride\par Rare urinary incontinence, wears 1 depends a day \par Decrease in nocturia down to 3-4, was previous 5-6\par \par Still bothered by urgency, which he notices is worse when he is at home " just sitting around"\par Denies any hematuria or dysuria

## 2020-07-27 NOTE — ASSESSMENT
[FreeTextEntry1] : BPH\par -PVR today was 51 cc\par -will stop Finasteride as patient has noticed little relief of LUTS.  Maintain Alfuzosin\par -Trial of Myrbetriq for urinary urgency, goals of medication reviewed with patient \par \par \par RTC in 2 months

## 2020-08-10 ENCOUNTER — APPOINTMENT (OUTPATIENT)
Dept: HEMATOLOGY ONCOLOGY | Facility: CLINIC | Age: 85
End: 2020-08-10
Payer: MEDICARE

## 2020-08-10 VITALS
SYSTOLIC BLOOD PRESSURE: 159 MMHG | WEIGHT: 144.6 LBS | HEIGHT: 67 IN | TEMPERATURE: 98.1 F | DIASTOLIC BLOOD PRESSURE: 74 MMHG | BODY MASS INDEX: 22.7 KG/M2 | OXYGEN SATURATION: 96 % | HEART RATE: 76 BPM

## 2020-08-10 DIAGNOSIS — Z11.59 ENCOUNTER FOR SCREENING FOR OTHER VIRAL DISEASES: ICD-10-CM

## 2020-08-10 PROCEDURE — 36415 COLL VENOUS BLD VENIPUNCTURE: CPT

## 2020-08-10 PROCEDURE — 99214 OFFICE O/P EST MOD 30 MIN: CPT | Mod: 25

## 2020-08-10 NOTE — HISTORY OF PRESENT ILLNESS
[de-identified] : He is tolerating Eliquis well without any bruising or bleeding. CBC is stable, Hb and platelet is stable. 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 He is scheduled to see Dr. Muniz from urology tomorrow. \par \par He is on iron tablets, tolerating well. \par \par Received IV Iron x2...\par \par 11-5-2019 getting pains in his feet when he takes the support stockings off...\par \par May 19, 2020 patient requested a follow-up appointment since he had blood work done last week and he wanted to discuss his results\par \par 1/28/2020 pt returns for f/u...seeing multiple docs...[facility], even so his total cholesterol is 164 by their results (which are scanned in...) was also told to go back on Aspirin, but pt has low plt, and had 2 bleeding ulcers...\par \par August 10, 2020 returning for follow-up has no complaints... Unable to visit [facility] this summer because of the pandemic... Keeping social distance for most part\par  [de-identified] : Patient with CLL here for follow up

## 2020-08-10 NOTE — ASSESSMENT
[FreeTextEntry1] :  CLL, asymptomatic, thrombocytopenic now... But feeling fine... We will do repeat blood work\par \par \par \par \par \par -RTC in 6 months or sooner if needed

## 2020-08-10 NOTE — CONSULT LETTER
[Consult Letter:] : I had the pleasure of evaluating your patient, [unfilled]. [Dear  ___] : Dear  [unfilled], [Sincerely,] : Sincerely, [Consult Closing:] : Thank you very much for allowing me to participate in the care of this patient.  If you have any questions, please do not hesitate to contact me. [Please see my note below.] : Please see my note below. [FreeTextEntry3] : Brandy Oliveira MD\par

## 2020-08-11 LAB
25(OH)D3 SERPL-MCNC: 75.2 NG/ML
ALBUMIN SERPL ELPH-MCNC: 4.5 G/DL
ALP BLD-CCNC: 63 U/L
ALT SERPL-CCNC: 9 U/L
ANION GAP SERPL CALC-SCNC: 14 MMOL/L
AST SERPL-CCNC: 17 U/L
B2 MICROGLOB SERPL-MCNC: 5.9 MG/L
BASOPHILS # BLD AUTO: 0.04 K/UL
BASOPHILS NFR BLD AUTO: 0.5 %
BILIRUB SERPL-MCNC: 0.5 MG/DL
BUN SERPL-MCNC: 34 MG/DL
CALCIUM SERPL-MCNC: 9.2 MG/DL
CHLORIDE SERPL-SCNC: 107 MMOL/L
CHOLEST SERPL-MCNC: 175 MG/DL
CHOLEST/HDLC SERPL: 4.9 RATIO
CO2 SERPL-SCNC: 22 MMOL/L
CREAT SERPL-MCNC: 1.43 MG/DL
EOSINOPHIL # BLD AUTO: 0.1 K/UL
EOSINOPHIL NFR BLD AUTO: 1.2 %
ERYTHROCYTE [SEDIMENTATION RATE] IN BLOOD BY WESTERGREN METHOD: 5 MM/HR
GLUCOSE SERPL-MCNC: 88 MG/DL
HCT VFR BLD CALC: 35.6 %
HDLC SERPL-MCNC: 36 MG/DL
HGB BLD-MCNC: 11.3 G/DL
IMM GRANULOCYTES NFR BLD AUTO: 0.1 %
LDLC SERPL CALC-MCNC: 104 MG/DL
LYMPHOCYTES # BLD AUTO: 5.58 K/UL
LYMPHOCYTES NFR BLD AUTO: 68.1 %
MAN DIFF?: NORMAL
MCHC RBC-ENTMCNC: 31.7 GM/DL
MCHC RBC-ENTMCNC: 32.2 PG
MCV RBC AUTO: 101.4 FL
MONOCYTES # BLD AUTO: 0.4 K/UL
MONOCYTES NFR BLD AUTO: 4.9 %
NEUTROPHILS # BLD AUTO: 2.06 K/UL
NEUTROPHILS NFR BLD AUTO: 25.2 %
PLATELET # BLD AUTO: 81 K/UL
POTASSIUM SERPL-SCNC: 4.6 MMOL/L
PROT SERPL-MCNC: 6.4 G/DL
RBC # BLD: 3.51 M/UL
RBC # FLD: 14.2 %
SARS-COV-2 IGG SERPL IA-ACNC: 0.08 INDEX
SARS-COV-2 IGG SERPL QL IA: NEGATIVE
SODIUM SERPL-SCNC: 144 MMOL/L
TRIGL SERPL-MCNC: 172 MG/DL
WBC # FLD AUTO: 8.19 K/UL

## 2020-08-17 ENCOUNTER — APPOINTMENT (OUTPATIENT)
Dept: HEART AND VASCULAR | Facility: CLINIC | Age: 85
End: 2020-08-17
Payer: MEDICARE

## 2020-08-17 ENCOUNTER — RX RENEWAL (OUTPATIENT)
Age: 85
End: 2020-08-17

## 2020-08-17 VITALS
HEART RATE: 68 BPM | SYSTOLIC BLOOD PRESSURE: 136 MMHG | DIASTOLIC BLOOD PRESSURE: 54 MMHG | BODY MASS INDEX: 22.6 KG/M2 | HEIGHT: 67 IN | RESPIRATION RATE: 14 BRPM | OXYGEN SATURATION: 96 % | WEIGHT: 144 LBS | TEMPERATURE: 98.6 F

## 2020-08-17 PROCEDURE — 99214 OFFICE O/P EST MOD 30 MIN: CPT

## 2020-08-17 PROCEDURE — 93000 ELECTROCARDIOGRAM COMPLETE: CPT

## 2020-08-17 NOTE — PHYSICAL EXAM
[Normal Appearance] : normal appearance [General Appearance - Well Developed] : well developed [Well Groomed] : well groomed [General Appearance - Well Nourished] : well nourished [No Deformities] : no deformities [Normal Conjunctiva] : the conjunctiva exhibited no abnormalities [General Appearance - In No Acute Distress] : no acute distress [Respiration, Rhythm And Depth] : normal respiratory rhythm and effort [] : no respiratory distress [Heart Sounds] : normal S1 and S2 [Exaggerated Use Of Accessory Muscles For Inspiration] : no accessory muscle use [Auscultation Breath Sounds / Voice Sounds] : lungs were clear to auscultation bilaterally [Skin Turgor] : normal skin turgor [Affect] : the affect was normal [Oriented To Time, Place, And Person] : oriented to person, place, and time [Mood] : the mood was normal [No Anxiety] : not feeling anxious [FreeTextEntry1] : walking with cane

## 2020-08-17 NOTE — HISTORY OF PRESENT ILLNESS
[FreeTextEntry1] : 89 year male who is planning to travel to [facility] in September or October. He denies having any chest pain, SOB or palpitation. Recent labs reviewed

## 2020-08-18 ENCOUNTER — APPOINTMENT (OUTPATIENT)
Dept: HEMATOLOGY ONCOLOGY | Facility: CLINIC | Age: 85
End: 2020-08-18
Payer: MEDICARE

## 2020-08-18 DIAGNOSIS — D69.6 THROMBOCYTOPENIA, UNSPECIFIED: ICD-10-CM

## 2020-08-18 PROCEDURE — 99442: CPT

## 2020-08-18 NOTE — CONSULT LETTER
[Dear  ___] : Dear  [unfilled], [Consult Letter:] : I had the pleasure of evaluating your patient, [unfilled]. [Please see my note below.] : Please see my note below. [Consult Closing:] : Thank you very much for allowing me to participate in the care of this patient.  If you have any questions, please do not hesitate to contact me. [FreeTextEntry3] : Brandy Oliveira MD\par  [Sincerely,] : Sincerely,

## 2020-08-18 NOTE — ASSESSMENT
[FreeTextEntry1] :  CLL, asymptomatic, thrombocytopenic ... I went over patient's medication very carefully... He takes no new meds... I advised him to stop the probiotic, and I will make arrangement for him to have repeat blood work in September when he visits Dr. Crawford's office...\par \par Follow-up here in February or sooner if needed.

## 2020-08-18 NOTE — HISTORY OF PRESENT ILLNESS
[de-identified] : Patient with CLL here for follow up  [de-identified] : He is tolerating Eliquis well without any bruising or bleeding. CBC is stable, Hb and platelet is stable. 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 He is scheduled to see Dr. Muniz from urology tomorrow. \par \par He is on iron tablets, tolerating well. \par \par Received IV Iron x2...\par \par 11-5-2019 getting pains in his feet when he takes the support stockings off...\par \par May 19, 2020 patient requested a follow-up appointment since he had blood work done last week and he wanted to discuss his results\par \par 1/28/2020 pt returns for f/u...seeing multiple docs...[facility], even so his total cholesterol is 164 by their results (which are scanned in...) was also told to go back on Aspirin, but pt has low plt, and had 2 bleeding ulcers...\par \par August 10, 2020 returning for follow-up has no complaints... Unable to visit [facility] this summer because of the pandemic... Keeping social distance for most part\par \par August 18, 2020 patient was found to have worsening thrombocytopenia... I do not believe the thrombocytopenia is secondary to his CLL... Therefore went very carefully over his medication list..\par

## 2020-08-19 LAB
ALBUMIN MFR SERPL ELPH: 65.7 %
ALBUMIN SERPL-MCNC: 4.2 G/DL
ALBUMIN/GLOB SERPL: 1.9 RATIO
ALPHA1 GLOB MFR SERPL ELPH: 4.5 %
ALPHA1 GLOB SERPL ELPH-MCNC: 0.3 G/DL
ALPHA2 GLOB MFR SERPL ELPH: 8.3 %
ALPHA2 GLOB SERPL ELPH-MCNC: 0.5 G/DL
B-GLOBULIN MFR SERPL ELPH: 8.8 %
B-GLOBULIN SERPL ELPH-MCNC: 0.6 G/DL
DEPRECATED KAPPA LC FREE/LAMBDA SER: 0.26 RATIO
GAMMA GLOB FLD ELPH-MCNC: 0.8 G/DL
GAMMA GLOB MFR SERPL ELPH: 12.7 %
IGA SER QL IEP: 96 MG/DL
IGG SER QL IEP: 822 MG/DL
IGM SER QL IEP: 30 MG/DL
INTERPRETATION SERPL IEP-IMP: NORMAL
KAPPA LC CSF-MCNC: 10.69 MG/DL
KAPPA LC SERPL-MCNC: 2.78 MG/DL
M PROTEIN SPEC IFE-MCNC: NORMAL
PROT SERPL-MCNC: 6.4 G/DL
PROT SERPL-MCNC: 6.4 G/DL

## 2020-09-17 ENCOUNTER — LABORATORY RESULT (OUTPATIENT)
Age: 85
End: 2020-09-17

## 2020-09-17 ENCOUNTER — APPOINTMENT (OUTPATIENT)
Dept: UROLOGY | Facility: CLINIC | Age: 85
End: 2020-09-17
Payer: MEDICARE

## 2020-09-17 VITALS — DIASTOLIC BLOOD PRESSURE: 83 MMHG | TEMPERATURE: 97.4 F | HEART RATE: 76 BPM | SYSTOLIC BLOOD PRESSURE: 189 MMHG

## 2020-09-17 VITALS — SYSTOLIC BLOOD PRESSURE: 177 MMHG | DIASTOLIC BLOOD PRESSURE: 82 MMHG

## 2020-09-17 PROCEDURE — 99213 OFFICE O/P EST LOW 20 MIN: CPT

## 2020-09-17 NOTE — ASSESSMENT
[FreeTextEntry1] : BPH\par -PVR today is 14 cc\par -BP elevated, advised to stop Myrbetriq due to elevation in BP\par -will go back on Finasteride and will continue Alfuzosin\par -discussion of Rezum procedure, will need to follow up with cystoscopy after patient returns from [facility]\par

## 2020-09-17 NOTE — HISTORY OF PRESENT ILLNESS
[FreeTextEntry1] : Currently on Alfuzosin and Myrbetriq\par Notices improvement since starting Myrbetriq 3 weeks ago\par Nocturia x 2 now\par Mild decrease in daytime frequency/urgency\par increasing HTN on mrybetriq\par Denies any dysuria or hematuria\par

## 2020-09-17 NOTE — PHYSICAL EXAM
[General Appearance - Well Developed] : well developed [General Appearance - Well Nourished] : well nourished [Normal Appearance] : normal appearance [Well Groomed] : well groomed [General Appearance - In No Acute Distress] : no acute distress [Abdomen Soft] : soft [Abdomen Tenderness] : non-tender [Costovertebral Angle Tenderness] : no ~M costovertebral angle tenderness [Urinary Bladder Findings] : the bladder was normal on palpation [] : no respiratory distress [Respiration, Rhythm And Depth] : normal respiratory rhythm and effort [Exaggerated Use Of Accessory Muscles For Inspiration] : no accessory muscle use [Oriented To Time, Place, And Person] : oriented to person, place, and time [Affect] : the affect was normal [Mood] : the mood was normal [Not Anxious] : not anxious [FreeTextEntry1] : uses a cane

## 2021-01-06 ENCOUNTER — APPOINTMENT (OUTPATIENT)
Dept: HEART AND VASCULAR | Facility: CLINIC | Age: 86
End: 2021-01-06
Payer: MEDICARE

## 2021-01-06 VITALS
HEART RATE: 78 BPM | WEIGHT: 146.03 LBS | OXYGEN SATURATION: 97 % | HEIGHT: 67 IN | BODY MASS INDEX: 22.92 KG/M2 | RESPIRATION RATE: 14 BRPM | SYSTOLIC BLOOD PRESSURE: 134 MMHG | DIASTOLIC BLOOD PRESSURE: 70 MMHG | TEMPERATURE: 98.3 F

## 2021-01-06 PROCEDURE — 93000 ELECTROCARDIOGRAM COMPLETE: CPT

## 2021-01-06 PROCEDURE — 99214 OFFICE O/P EST MOD 30 MIN: CPT

## 2021-01-06 PROCEDURE — 99072 ADDL SUPL MATRL&STAF TM PHE: CPT

## 2021-01-06 NOTE — ASSESSMENT
[FreeTextEntry1] : I encouraged continued risk factor reduction and gradual increase in aerobic activity as tolerated

## 2021-01-06 NOTE — HISTORY OF PRESENT ILLNESS
[FreeTextEntry1] : 89 year male who traveled to [facility] in November without complications. He quarantined in [place] for 2 weeks and had a nasal PCR upon return which was negative. [facility]. He has no chest pain, SOB or dizziness. He notes having baseline palpitations

## 2021-01-07 ENCOUNTER — APPOINTMENT (OUTPATIENT)
Dept: INTERNAL MEDICINE | Facility: CLINIC | Age: 86
End: 2021-01-07
Payer: MEDICARE

## 2021-01-07 VITALS
RESPIRATION RATE: 14 BRPM | HEART RATE: 82 BPM | SYSTOLIC BLOOD PRESSURE: 148 MMHG | DIASTOLIC BLOOD PRESSURE: 80 MMHG | TEMPERATURE: 98.1 F | HEIGHT: 67 IN | WEIGHT: 146 LBS | BODY MASS INDEX: 22.91 KG/M2 | OXYGEN SATURATION: 97 %

## 2021-01-07 PROCEDURE — 99072 ADDL SUPL MATRL&STAF TM PHE: CPT

## 2021-01-07 PROCEDURE — 99397 PER PM REEVAL EST PAT 65+ YR: CPT

## 2021-01-07 NOTE — PLAN
[FreeTextEntry1] : Wellness complete\par labs reviewed from summer and fall.\par CLL- will follow up with Dr Oliveira\par Carotid stenosis- will f/up with Dr Chairez - no asa no statin\par DVT- stable\par BPH- continue 2 drug regimen

## 2021-01-07 NOTE — HISTORY OF PRESENT ILLNESS
[FreeTextEntry1] : wellness [de-identified] : 89 yo M w/ hx CLL, DVT, PUD , gout, CAD, s/p 2 stents(last in 2010 ),colon polyps \par reports intermittent joint pain\par s/p eval with Dr Hodge\par  eval tomorrow with Dr Osborne. Nocturia- gets up 3-4 times a night\par remains on eliquis\par Was in [place], no issues.\[facility]  \par flu shot in September.

## 2021-01-07 NOTE — PHYSICAL EXAM
[Normal Rate] : normal rate  [Regular Rhythm] : with a regular rhythm [No Edema] : there was no peripheral edema [Normal] : affect was normal and insight and judgment were intact [de-identified] : slow gait

## 2021-01-08 ENCOUNTER — APPOINTMENT (OUTPATIENT)
Dept: UROLOGY | Facility: CLINIC | Age: 86
End: 2021-01-08
Payer: MEDICARE

## 2021-01-08 VITALS
DIASTOLIC BLOOD PRESSURE: 68 MMHG | TEMPERATURE: 98.1 F | HEIGHT: 67 IN | SYSTOLIC BLOOD PRESSURE: 148 MMHG | HEART RATE: 80 BPM

## 2021-01-08 PROCEDURE — 52000 CYSTOURETHROSCOPY: CPT

## 2021-01-08 PROCEDURE — 99072 ADDL SUPL MATRL&STAF TM PHE: CPT

## 2021-01-08 PROCEDURE — 99213 OFFICE O/P EST LOW 20 MIN: CPT | Mod: 25

## 2021-01-11 NOTE — HISTORY OF PRESENT ILLNESS
[FreeTextEntry1] : returns for follow up\par see attached cysto\par nocturia x 4\par +bother\par interested in outlet procedure\par good urolfit candidate

## 2021-01-11 NOTE — ASSESSMENT
[FreeTextEntry1] : BPH\par We spoke at length about the role of prostatic urethral lift today. He understands that this is a minimally invasive procedure which may help significantly with his lower urinary tract symptoms and get him off of medications. Risks, including pelvic pain, gross hematuria, dysuria and short lived urinary urgency were discussed at length. He understands that he is highly unlikely to require a postoperative catheter and that he is likely to return to work within a few days.\par he is strongly interested\par will discuss with his family\par to OR

## 2021-01-14 ENCOUNTER — APPOINTMENT (OUTPATIENT)
Dept: INTERNAL MEDICINE | Facility: CLINIC | Age: 86
End: 2021-01-14
Payer: MEDICARE

## 2021-01-14 ENCOUNTER — APPOINTMENT (OUTPATIENT)
Dept: VASCULAR SURGERY | Facility: CLINIC | Age: 86
End: 2021-01-14
Payer: MEDICARE

## 2021-01-14 PROCEDURE — 99213 OFFICE O/P EST LOW 20 MIN: CPT

## 2021-01-14 PROCEDURE — 99072 ADDL SUPL MATRL&STAF TM PHE: CPT

## 2021-01-14 PROCEDURE — 93880 EXTRACRANIAL BILAT STUDY: CPT

## 2021-01-14 NOTE — HISTORY OF PRESENT ILLNESS
[FreeTextEntry1] : 89yoM w/h/o CLL, PUD, gout, CAD, s/p PTCA x 2 (last in 2010 ), colon polyps presenting to office for FU b/l LE DVT. Right (distal femoral/pop/peroneal) left leg (pop/PT), left leg DVT resolved. Wearing compression stockings, no issues. Pt returns today for reevaluation of his asymptomatic b/l ICA stenosis.

## 2021-01-14 NOTE — PROCEDURE
[FreeTextEntry1] : Carotid duplex today reveals L ICA stenosis close to 70%, fibrocalcific plaque, R ICA 50-69%

## 2021-01-14 NOTE — ASSESSMENT
[FreeTextEntry1] : 89yoM w/h/o CLL, PUD, gout, CAD, s/p PTCA x 2 (last in 2010 ), colon polyps presenting to office for FU b/l LE DVT. Right (distal femoral/pop/peroneal) left leg (pop/PT), left leg DVT resolved. Wearing compression stockings, no issues. Pt returns today for reevaluation of his asymptomatic b/l ICA stenosis.\par \par Neuro exam WNL today, and carotid duplex today reveals L ICA stenosis close to 70%, fibrocalcific plaque, R ICA 50-69%.  Will continue to monitor carotid disease q6mos w/duplex; he may require L CEA if L ICA stenosis continues to progress.  Recommend he continue his current medical regimen.

## 2021-01-14 NOTE — PHYSICAL EXAM
[Normal Breath Sounds] : Normal breath sounds [Normal Heart Sounds] : normal heart sounds [2+] : left 2+ [Ankle Swelling Bilaterally] : bilaterally  [Ankle Swelling On The Right] : mild [No Rash or Lesion] : No rash or lesion [Alert] : alert [Calm] : calm [JVD] : no jugular venous distention  [Carotid Bruits] : no carotid bruits [Ankle Swelling (On Exam)] : not present [Varicose Veins Of Lower Extremities] : not present [] : not present [Abdomen Tenderness] : ~T ~M No abdominal tenderness [de-identified] : WN/WD, NAD [de-identified] : NC/AT, EOMIx6 [de-identified] : FROM throughout, strength 5/5x4 [de-identified] : CNII-XII/HEYDI grossly intact

## 2021-01-21 ENCOUNTER — APPOINTMENT (OUTPATIENT)
Dept: HEMATOLOGY ONCOLOGY | Facility: CLINIC | Age: 86
End: 2021-01-21
Payer: MEDICARE

## 2021-01-21 DIAGNOSIS — R35.1 NOCTURIA: ICD-10-CM

## 2021-01-21 PROCEDURE — 99443: CPT

## 2021-01-21 NOTE — ASSESSMENT
[FreeTextEntry1] :  CLL, asymptomatic, thrombocytopenic ... I will arrange for patient to have repeat blood work via home draw, and assess his suitability for surgery.\par \par \par Case discussed with Dr. Crawford , plan is to have the surgery in 1 months... Procedure assured and is not usually involved with excessive bleed...\par \par I ordered urine analysis and urine culture per Dr. Crawford request.

## 2021-01-21 NOTE — HISTORY OF PRESENT ILLNESS
[de-identified] : Patient with CLL here for follow up  [de-identified] : He is tolerating Eliquis well without any bruising or bleeding. CBC is stable, Hb and platelet is stable. 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 He is scheduled to see Dr. Muniz from urology tomorrow. \par \par He is on iron tablets, tolerating well. \par \par Received IV Iron x2...\par \par 11-5-2019 getting pains in his feet when he takes the support stockings off...\par \par May 19, 2020 patient requested a follow-up appointment since he had blood work done last week and he wanted to discuss his results\par \par 1/28/2020 pt returns for f/u...seeing multiple docs...[facility] Sim[facility], even so his total cholesterol is 164 by their results (which are scanned in...) was also told to go back on Aspirin, but pt has low plt, and had 2 bleeding ulcers...\par \par August 10, 2020 returning for follow-up has no complaints... Unable to visit [facility] this summer because of the pandemic... Keeping social distance for most part\par \par August 18, 2020 patient was found to have worsening thrombocytopenia... I do not believe the thrombocytopenia is secondary to his CLL... Therefore went very carefully over his medication list..\par \par January 21, 2021 patient requested a follow-up appointment to discuss his urinary symptoms... He was seen by urology and he was proposed procedure to improve his nocturia... At this point patient has nocturia x4-5 times and is unable to get a good night sleep...\par

## 2021-02-04 ENCOUNTER — NON-APPOINTMENT (OUTPATIENT)
Age: 86
End: 2021-02-04

## 2021-02-04 DIAGNOSIS — R30.0 DYSURIA: ICD-10-CM

## 2021-02-10 ENCOUNTER — LABORATORY RESULT (OUTPATIENT)
Age: 86
End: 2021-02-10

## 2021-02-11 ENCOUNTER — RX RENEWAL (OUTPATIENT)
Age: 86
End: 2021-02-11

## 2021-02-11 ENCOUNTER — APPOINTMENT (OUTPATIENT)
Dept: HEMATOLOGY ONCOLOGY | Facility: CLINIC | Age: 86
End: 2021-02-11
Payer: MEDICARE

## 2021-02-11 PROCEDURE — 99443: CPT

## 2021-02-18 LAB
ALBUMIN MFR SERPL ELPH: 63.2 %
ALBUMIN SERPL ELPH-MCNC: 4.3 G/DL
ALBUMIN SERPL-MCNC: 4 G/DL
ALBUMIN/GLOB SERPL: 1.7 RATIO
ALP BLD-CCNC: 81 U/L
ALPHA1 GLOB MFR SERPL ELPH: 5.1 %
ALPHA1 GLOB SERPL ELPH-MCNC: 0.3 G/DL
ALPHA2 GLOB MFR SERPL ELPH: 9.3 %
ALPHA2 GLOB SERPL ELPH-MCNC: 0.6 G/DL
ALT SERPL-CCNC: 11 U/L
ANION GAP SERPL CALC-SCNC: 12 MMOL/L
APPEARANCE: ABNORMAL
APTT BLD: 31.8 SEC
AST SERPL-CCNC: 17 U/L
B-GLOBULIN MFR SERPL ELPH: 9 %
B-GLOBULIN SERPL ELPH-MCNC: 0.6 G/DL
BACTERIA UR CULT: ABNORMAL
BASOPHILS # BLD AUTO: 0.04 K/UL
BASOPHILS NFR BLD AUTO: 0.5 %
BILIRUB SERPL-MCNC: 0.4 MG/DL
BILIRUBIN URINE: NEGATIVE
BLOOD URINE: ABNORMAL
BUN SERPL-MCNC: 36 MG/DL
CALCIUM SERPL-MCNC: 9.4 MG/DL
CHLORIDE SERPL-SCNC: 109 MMOL/L
CO2 SERPL-SCNC: 23 MMOL/L
COLOR: YELLOW
CREAT SERPL-MCNC: 1.48 MG/DL
DEPRECATED KAPPA LC FREE/LAMBDA SER: 0.28 RATIO
EOSINOPHIL # BLD AUTO: 0.11 K/UL
EOSINOPHIL NFR BLD AUTO: 1.4 %
ERYTHROCYTE [SEDIMENTATION RATE] IN BLOOD BY WESTERGREN METHOD: 10 MM/HR
GAMMA GLOB FLD ELPH-MCNC: 0.9 G/DL
GAMMA GLOB MFR SERPL ELPH: 13.4 %
GLUCOSE QUALITATIVE U: NEGATIVE
GLUCOSE SERPL-MCNC: 93 MG/DL
HCT VFR BLD CALC: 35 %
HGB BLD-MCNC: 11.4 G/DL
IGA SER QL IEP: 95 MG/DL
IGG SER QL IEP: 943 MG/DL
IGM SER QL IEP: 29 MG/DL
IMM GRANULOCYTES NFR BLD AUTO: 0.2 %
INR PPP: 0.96 RATIO
INTERPRETATION SERPL IEP-IMP: NORMAL
KAPPA LC CSF-MCNC: 11.6 MG/DL
KAPPA LC SERPL-MCNC: 3.29 MG/DL
KETONES URINE: NEGATIVE
LDH SERPL-CCNC: 205 U/L
LEUKOCYTE ESTERASE URINE: ABNORMAL
LYMPHOCYTES # BLD AUTO: 5.36 K/UL
LYMPHOCYTES NFR BLD AUTO: 66.5 %
M PROTEIN SPEC IFE-MCNC: NORMAL
MAN DIFF?: NORMAL
MCHC RBC-ENTMCNC: 31.9 PG
MCHC RBC-ENTMCNC: 32.6 GM/DL
MCV RBC AUTO: 98 FL
MONOCYTES # BLD AUTO: 0.47 K/UL
MONOCYTES NFR BLD AUTO: 5.8 %
NEUTROPHILS # BLD AUTO: 2.06 K/UL
NEUTROPHILS NFR BLD AUTO: 25.6 %
NITRITE URINE: POSITIVE
PH URINE: 5
PLATELET # BLD AUTO: 85 K/UL
POTASSIUM SERPL-SCNC: 4.9 MMOL/L
PROT SERPL-MCNC: 6.4 G/DL
PROTEIN URINE: ABNORMAL
PT BLD: 11.4 SEC
RBC # BLD: 3.57 M/UL
RBC # FLD: 14.2 %
SODIUM SERPL-SCNC: 144 MMOL/L
SPECIFIC GRAVITY URINE: 1.01
UROBILINOGEN URINE: NORMAL
VIT B12 SERPL-MCNC: 1485 PG/ML
WBC # FLD AUTO: 8.06 K/UL

## 2021-02-18 NOTE — HISTORY OF PRESENT ILLNESS
[Home] : at home, [unfilled] , at the time of the visit. [Other Location: e.g. Home (Enter Location, City,State)___] : at [unfilled] [Verbal consent obtained from patient] : the patient, [unfilled] [de-identified] : Patient with CLL here for follow up  [de-identified] : He is tolerating Eliquis well without any bruising or bleeding. CBC is stable, Hb and platelet is stable. 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 He is scheduled to see Dr. Muniz from urology tomorrow. \par \par He is on iron tablets, tolerating well. \par \par Received IV Iron x2...\par \par 11-5-2019 getting pains in his feet when he takes the support stockings off...\par \par May 19, 2020 patient requested a follow-up appointment since he had blood work done last week and he wanted to discuss his results\par \par 1/28/2020 pt returns for f/u...seeing multiple docs...[facility] Simvastatin, even so his total cholesterol is 164 by their results (which are scanned in...) was also told to go back on Aspirin, but pt has low plt, and had 2 bleeding ulcers...\par \par August 10, 2020 returning for follow-up has no complaints... Unable to visit [facility] this summer because of the pandemic... Keeping social distance for most part\par \par August 18, 2020 patient was found to have worsening thrombocytopenia... I do not believe the thrombocytopenia is secondary to his CLL... Therefore went very carefully over his medication list..\par \par January 21, 2021 patient requested a follow-up appointment to discuss his urinary symptoms... He was seen by urology and he was proposed procedure to improve his nocturia... At this point patient has nocturia x4-5 times and is unable to get a good night sleep...\par \par \par February 11,2021...had repeat blood work yesterday...plt low...

## 2021-02-18 NOTE — ASSESSMENT
[FreeTextEntry1] :  CLL, asymptomatic, thrombocytopenic ... I will arrange for patient to have repeat blood work via home draw, and assess his suitability for surgery.\par \par \par Case discussed with Dr. Crawford , plan is to have the surgery in 1 months... Procedure assured and is not usually involved with excessive bleed...\par \par I ordered urine analysis and urine culture per Dr. Crawford request.\par \par February 18, 2021 discussed with Dr. Crawford, patient was treated for his UTI... He is going to have the procedure in 2 weeks... Dr. Charisse alicea with platelet count of 85,000

## 2021-03-15 ENCOUNTER — APPOINTMENT (OUTPATIENT)
Dept: INTERNAL MEDICINE | Facility: CLINIC | Age: 86
End: 2021-03-15

## 2021-03-16 ENCOUNTER — APPOINTMENT (OUTPATIENT)
Dept: HEART AND VASCULAR | Facility: CLINIC | Age: 86
End: 2021-03-16
Payer: MEDICARE

## 2021-03-16 VITALS
SYSTOLIC BLOOD PRESSURE: 134 MMHG | DIASTOLIC BLOOD PRESSURE: 64 MMHG | OXYGEN SATURATION: 97 % | BODY MASS INDEX: 21.98 KG/M2 | RESPIRATION RATE: 14 BRPM | HEART RATE: 71 BPM | HEIGHT: 67 IN | TEMPERATURE: 98.1 F | WEIGHT: 140.04 LBS

## 2021-03-16 DIAGNOSIS — I27.20 PULMONARY HYPERTENSION, UNSPECIFIED: ICD-10-CM

## 2021-03-16 PROCEDURE — 93000 ELECTROCARDIOGRAM COMPLETE: CPT

## 2021-03-16 PROCEDURE — 99072 ADDL SUPL MATRL&STAF TM PHE: CPT

## 2021-03-16 PROCEDURE — 99214 OFFICE O/P EST MOD 30 MIN: CPT

## 2021-03-16 PROCEDURE — 36415 COLL VENOUS BLD VENIPUNCTURE: CPT

## 2021-03-16 NOTE — ASSESSMENT
[FreeTextEntry1] : I informed the patient that I did not find a Cardiovascular contraindication to the proposed surgery at this time. Preop labs were sent for Dr Cox to review, who plans a TeleHealth medical evaluation pre-op. Followup for Echocardiogram in Spring

## 2021-03-16 NOTE — HISTORY OF PRESENT ILLNESS
[FreeTextEntry1] : 89 year male with CAD, MR and pulmonary hypertension who comes for Cardiac evaluation prior to  procedure for nocturia with Dr Crawford at [facility] on March 22, 2021. At the time of his visit we reviewed his labs from mid-February and his CXR results. He denies having any chest pain, SOB, orthopnea, PND or dizziness. He notes brief palpitations when rushing to go to the bathroom to urinate in the morning without any dizziness. It does not occur in the street. He check his home BP and temperature daily. His BP is OK at home.

## 2021-03-17 ENCOUNTER — APPOINTMENT (OUTPATIENT)
Dept: INTERNAL MEDICINE | Facility: CLINIC | Age: 86
End: 2021-03-17
Payer: MEDICARE

## 2021-03-17 DIAGNOSIS — B96.20 URINARY TRACT INFECTION, SITE NOT SPECIFIED: ICD-10-CM

## 2021-03-17 DIAGNOSIS — N39.0 URINARY TRACT INFECTION, SITE NOT SPECIFIED: ICD-10-CM

## 2021-03-17 PROCEDURE — 99443: CPT

## 2021-03-19 NOTE — REVIEW OF SYSTEMS
[Frequency] : frequency [Joint Pain] : joint pain [Back Pain] : back pain [Negative] : Psychiatric [Dysuria] : no dysuria [Hematuria] : no hematuria

## 2021-03-19 NOTE — HISTORY OF PRESENT ILLNESS
[Home] : at home, [unfilled] , at the time of the visit. [Medical Office: ([facility])___] : at the medical office located in  [Verbal consent obtained from patient] : the patient, [unfilled] [de-identified] : 89 yo M w/ hx CLL, DVT, PUD ,CKD 3,  gout, CAD, s/p 2 stents(last in 2010 ),colon polyps \par Plan for surgery for enlarged prostate, urinary frequency LUTS. \par Seen by Dr Hodge, cardiology- yesterday\par Feeling well without c/o

## 2021-03-19 NOTE — PLAN
[FreeTextEntry1] : Labs reviewed.  EKG and CXR reviewed.  Note reviewed from Dr Hodge. Patient is medically optimized to proceed with surgery.  Plan to continue bactrim -with eGFR > 30 no dose adjustment necessary

## 2021-03-21 ENCOUNTER — TRANSCRIPTION ENCOUNTER (OUTPATIENT)
Age: 86
End: 2021-03-21

## 2021-03-21 LAB — SARS-COV-2 N GENE NPH QL NAA+PROBE: NOT DETECTED

## 2021-03-22 ENCOUNTER — APPOINTMENT (OUTPATIENT)
Dept: UROLOGY | Facility: AMBULATORY SURGERY CENTER | Age: 86
End: 2021-03-22

## 2021-03-22 ENCOUNTER — OUTPATIENT (OUTPATIENT)
Dept: OUTPATIENT SERVICES | Facility: HOSPITAL | Age: 86
LOS: 1 days | Discharge: HOME | End: 2021-03-22
Payer: MEDICARE

## 2021-03-22 PROCEDURE — 52441 CYSTO INSJ TRNSPRSTC 1 IMPLT: CPT

## 2021-03-22 PROCEDURE — 52442 CYSTO INS TRNSPRSTC IMPLT EA: CPT

## 2021-03-22 RX ORDER — AZTREONAM 2 G
1 VIAL (EA) INJECTION
Qty: 10 | Refills: 0
Start: 2021-03-22 | End: 2021-03-26

## 2021-03-22 RX ORDER — ACETAMINOPHEN WITH CODEINE 300MG-30MG
1 TABLET ORAL
Qty: 10 | Refills: 0
Start: 2021-03-22

## 2021-03-22 NOTE — BRIEF OPERATIVE NOTE - ESTIMATED BLOOD LOSS
Assisted Dr. Pandya with MD consult for patients left foot. Tawana RICKS changed foot dressing. Measurements and pictures taken.    2

## 2021-03-23 ENCOUNTER — NON-APPOINTMENT (OUTPATIENT)
Age: 86
End: 2021-03-23

## 2021-03-23 LAB
ALBUMIN SERPL ELPH-MCNC: 4.6 G/DL
ALP BLD-CCNC: 80 U/L
ALT SERPL-CCNC: 10 U/L
ANION GAP SERPL CALC-SCNC: 11 MMOL/L
APPEARANCE: ABNORMAL
APTT BLD: 32.6 SEC
AST SERPL-CCNC: 22 U/L
BACTERIA UR CULT: ABNORMAL
BACTERIA: ABNORMAL
BASOPHILS # BLD AUTO: 0.05 K/UL
BASOPHILS NFR BLD AUTO: 0.5 %
BILIRUB SERPL-MCNC: 0.4 MG/DL
BILIRUBIN URINE: NEGATIVE
BLOOD URINE: ABNORMAL
BUN SERPL-MCNC: 38 MG/DL
CALCIUM SERPL-MCNC: 9.6 MG/DL
CHLORIDE SERPL-SCNC: 109 MMOL/L
CO2 SERPL-SCNC: 23 MMOL/L
COLOR: YELLOW
CREAT SERPL-MCNC: 1.41 MG/DL
EOSINOPHIL # BLD AUTO: 0.09 K/UL
EOSINOPHIL NFR BLD AUTO: 1 %
GLUCOSE QUALITATIVE U: NEGATIVE
GLUCOSE SERPL-MCNC: 111 MG/DL
HCT VFR BLD CALC: 37.1 %
HGB BLD-MCNC: 11.9 G/DL
HYALINE CASTS: 0 /LPF
IMM GRANULOCYTES NFR BLD AUTO: 0.2 %
INR PPP: 0.99 RATIO
KETONES URINE: NEGATIVE
LEUKOCYTE ESTERASE URINE: ABNORMAL
LYMPHOCYTES # BLD AUTO: 6.34 K/UL
LYMPHOCYTES NFR BLD AUTO: 67.4 %
MAN DIFF?: NORMAL
MCHC RBC-ENTMCNC: 32.1 GM/DL
MCHC RBC-ENTMCNC: 32.3 PG
MCV RBC AUTO: 100.8 FL
MICROSCOPIC-UA: NORMAL
MONOCYTES # BLD AUTO: 0.44 K/UL
MONOCYTES NFR BLD AUTO: 4.7 %
NEUTROPHILS # BLD AUTO: 2.47 K/UL
NEUTROPHILS NFR BLD AUTO: 26.2 %
NITRITE URINE: NEGATIVE
PH URINE: 5
PLATELET # BLD AUTO: 78 K/UL
POTASSIUM SERPL-SCNC: 4.9 MMOL/L
PROT SERPL-MCNC: 7.1 G/DL
PROTEIN URINE: NORMAL
PT BLD: 11.7 SEC
RBC # BLD: 3.68 M/UL
RBC # FLD: 14.6 %
RED BLOOD CELLS URINE: 5 /HPF
SODIUM SERPL-SCNC: 142 MMOL/L
SPECIFIC GRAVITY URINE: 1.02
SQUAMOUS EPITHELIAL CELLS: 0 /HPF
UROBILINOGEN URINE: NORMAL
WBC # FLD AUTO: 9.41 K/UL
WHITE BLOOD CELLS URINE: 81 /HPF

## 2021-03-24 ENCOUNTER — APPOINTMENT (OUTPATIENT)
Dept: UROLOGY | Facility: AMBULATORY SURGERY CENTER | Age: 86
End: 2021-03-24

## 2021-03-24 ENCOUNTER — APPOINTMENT (OUTPATIENT)
Dept: UROLOGY | Facility: CLINIC | Age: 86
End: 2021-03-24
Payer: MEDICARE

## 2021-03-24 VITALS — HEART RATE: 90 BPM | SYSTOLIC BLOOD PRESSURE: 141 MMHG | DIASTOLIC BLOOD PRESSURE: 71 MMHG | TEMPERATURE: 98.2 F

## 2021-03-24 PROCEDURE — 99024 POSTOP FOLLOW-UP VISIT: CPT

## 2021-03-24 PROCEDURE — 51702 INSERT TEMP BLADDER CATH: CPT

## 2021-03-24 NOTE — ASSESSMENT
[FreeTextEntry1] : 89 year old male POD # 2 from urolift\par -gross hematuria noted, catheter increased to 22 Fr\par -Irrigated an removed 5-10 dime size clots, some bladder spams\par -bladder US in office shows catheter within bladder, no clots seen on US\par -reviewed s/s to monitor for with patient \par \par Will follow up on Friday for trial void

## 2021-03-24 NOTE — HISTORY OF PRESENT ILLNESS
[FreeTextEntry1] : 89 year old male who underwent urolyft on 3/22/21\par Doing well since surgery \par Called office yesterday to report some hematuria\par \par Presents today for trial void\par Urine in leg bag noted to be dark red, unable to see through.  small clots noted\par Patient denies any suprapubic tenderness, flank pain, or fevers\par \par

## 2021-03-24 NOTE — PHYSICAL EXAM
[General Appearance - Well Developed] : well developed [General Appearance - Well Nourished] : well nourished [Normal Appearance] : normal appearance [Well Groomed] : well groomed [General Appearance - In No Acute Distress] : no acute distress [Abdomen Soft] : soft [Abdomen Tenderness] : non-tender [Costovertebral Angle Tenderness] : no ~M costovertebral angle tenderness [Oriented To Time, Place, And Person] : oriented to person, place, and time [Affect] : the affect was normal [Mood] : the mood was normal [Not Anxious] : not anxious [Normal Station and Gait] : the gait and station were normal for the patient's age

## 2021-03-25 ENCOUNTER — NON-APPOINTMENT (OUTPATIENT)
Age: 86
End: 2021-03-25

## 2021-03-26 ENCOUNTER — APPOINTMENT (OUTPATIENT)
Dept: UROLOGY | Facility: CLINIC | Age: 86
End: 2021-03-26
Payer: MEDICARE

## 2021-03-26 VITALS — SYSTOLIC BLOOD PRESSURE: 158 MMHG | HEART RATE: 76 BPM | DIASTOLIC BLOOD PRESSURE: 66 MMHG | TEMPERATURE: 98.2 F

## 2021-03-26 LAB
CULTURE RESULTS: NO GROWTH — SIGNIFICANT CHANGE UP
SPECIMEN SOURCE: SIGNIFICANT CHANGE UP

## 2021-03-26 PROCEDURE — 99212 OFFICE O/P EST SF 10 MIN: CPT

## 2021-03-26 PROCEDURE — 99072 ADDL SUPL MATRL&STAF TM PHE: CPT

## 2021-03-26 NOTE — HISTORY OF PRESENT ILLNESS
[FreeTextEntry1] : returns for follow up s/p urolift\par urine now clear\par for voiding trial today\par mulitple spasms on attmept to fill

## 2021-03-29 ENCOUNTER — NON-APPOINTMENT (OUTPATIENT)
Age: 86
End: 2021-03-29

## 2021-03-31 ENCOUNTER — NON-APPOINTMENT (OUTPATIENT)
Age: 86
End: 2021-03-31

## 2021-04-01 ENCOUNTER — APPOINTMENT (OUTPATIENT)
Dept: HEART AND VASCULAR | Facility: CLINIC | Age: 86
End: 2021-04-01

## 2021-04-06 ENCOUNTER — APPOINTMENT (OUTPATIENT)
Dept: UROLOGY | Facility: CLINIC | Age: 86
End: 2021-04-06
Payer: MEDICARE

## 2021-04-06 PROCEDURE — 99213 OFFICE O/P EST LOW 20 MIN: CPT

## 2021-04-06 PROCEDURE — 51798 US URINE CAPACITY MEASURE: CPT

## 2021-04-06 PROCEDURE — 99072 ADDL SUPL MATRL&STAF TM PHE: CPT

## 2021-04-06 NOTE — ASSESSMENT
[FreeTextEntry1] : BPH\par PE after urolift\par due to see Dr Hodge this week\par advised to see Dr Oliveira to review anticoagulation\par urinary symptoms are improved\par PVR 23cc\par f/u here in 6 weeks

## 2021-04-06 NOTE — HISTORY OF PRESENT ILLNESS
[FreeTextEntry1] : returns for follow up s/p urolift\par was admitted last week to [facility] x 5 days\par per report - likely PE and DVT\par +SOB\par hematuria resolved\par urinary symptoms are improved\par nocturia x 1\par now on anticoagulation

## 2021-04-07 ENCOUNTER — APPOINTMENT (OUTPATIENT)
Dept: HEART AND VASCULAR | Facility: CLINIC | Age: 86
End: 2021-04-07
Payer: MEDICARE

## 2021-04-07 VITALS
HEIGHT: 67 IN | HEART RATE: 86 BPM | RESPIRATION RATE: 14 BRPM | WEIGHT: 140 LBS | DIASTOLIC BLOOD PRESSURE: 64 MMHG | SYSTOLIC BLOOD PRESSURE: 120 MMHG | OXYGEN SATURATION: 97 % | TEMPERATURE: 97.2 F | BODY MASS INDEX: 21.97 KG/M2

## 2021-04-07 DIAGNOSIS — I26.99 OTHER PULMONARY EMBOLISM W/OUT ACUTE COR PULMONALE: ICD-10-CM

## 2021-04-07 PROCEDURE — 99072 ADDL SUPL MATRL&STAF TM PHE: CPT

## 2021-04-07 PROCEDURE — 99214 OFFICE O/P EST MOD 30 MIN: CPT

## 2021-04-07 NOTE — HISTORY OF PRESENT ILLNESS
[FreeTextEntry1] : 89 year male who reports having DVT/PE and hematuria last week. He is still planning to travel to [place] later this month.

## 2021-04-07 NOTE — ASSESSMENT
[FreeTextEntry1] : CLL, DVT/PE post-op--I urged the patient not to travel unless cleared by Pulmonary and Hematology. He has an appointment to see Dr Oliveira next week and agrees to meet a [facility] Pulmonologist. Obtain hospital records

## 2021-04-07 NOTE — PHYSICAL EXAM
[General Appearance - Well Developed] : well developed [Normal Appearance] : normal appearance [Well Groomed] : well groomed [General Appearance - Well Nourished] : well nourished [No Deformities] : no deformities [General Appearance - In No Acute Distress] : no acute distress [Normal Conjunctiva] : the conjunctiva exhibited no abnormalities [] : no respiratory distress [Respiration, Rhythm And Depth] : normal respiratory rhythm and effort [Exaggerated Use Of Accessory Muscles For Inspiration] : no accessory muscle use [Auscultation Breath Sounds / Voice Sounds] : lungs were clear to auscultation bilaterally [Heart Sounds] : normal S1 and S2 [FreeTextEntry1] : walking with a cane [Skin Turgor] : normal skin turgor [Oriented To Time, Place, And Person] : oriented to person, place, and time [Affect] : the affect was normal [Mood] : the mood was normal [No Anxiety] : not feeling anxious

## 2021-04-13 ENCOUNTER — INPATIENT (INPATIENT)
Facility: HOSPITAL | Age: 86
LOS: 3 days | Discharge: HOME | DRG: 186 | End: 2021-04-17
Attending: STUDENT IN AN ORGANIZED HEALTH CARE EDUCATION/TRAINING PROGRAM | Admitting: STUDENT IN AN ORGANIZED HEALTH CARE EDUCATION/TRAINING PROGRAM
Payer: MEDICARE

## 2021-04-13 ENCOUNTER — APPOINTMENT (OUTPATIENT)
Dept: HEMATOLOGY ONCOLOGY | Facility: CLINIC | Age: 86
End: 2021-04-13
Payer: MEDICARE

## 2021-04-13 VITALS
SYSTOLIC BLOOD PRESSURE: 89 MMHG | TEMPERATURE: 97.3 F | HEART RATE: 89 BPM | WEIGHT: 136 LBS | HEIGHT: 67 IN | BODY MASS INDEX: 21.35 KG/M2 | OXYGEN SATURATION: 98 % | RESPIRATION RATE: 16 BRPM | DIASTOLIC BLOOD PRESSURE: 52 MMHG

## 2021-04-13 VITALS
HEART RATE: 110 BPM | SYSTOLIC BLOOD PRESSURE: 97 MMHG | OXYGEN SATURATION: 96 % | HEIGHT: 66 IN | TEMPERATURE: 98 F | DIASTOLIC BLOOD PRESSURE: 61 MMHG | RESPIRATION RATE: 22 BRPM | WEIGHT: 138.01 LBS

## 2021-04-13 DIAGNOSIS — R06.02 SHORTNESS OF BREATH: ICD-10-CM

## 2021-04-13 DIAGNOSIS — I82.409 ACUTE EMBOLISM AND THROMBOSIS OF UNSPECIFIED DEEP VEINS OF UNSPECIFIED LOWER EXTREMITY: ICD-10-CM

## 2021-04-13 DIAGNOSIS — N17.9 ACUTE KIDNEY FAILURE, UNSPECIFIED: ICD-10-CM

## 2021-04-13 DIAGNOSIS — I26.99 OTHER PULMONARY EMBOLISM WITHOUT ACUTE COR PULMONALE: ICD-10-CM

## 2021-04-13 DIAGNOSIS — K27.9 PEPTIC ULCER, SITE UNSPECIFIED, UNSPECIFIED AS ACUTE OR CHRONIC, WITHOUT HEMORRHAGE OR PERFORATION: ICD-10-CM

## 2021-04-13 DIAGNOSIS — N40.0 BENIGN PROSTATIC HYPERPLASIA WITHOUT LOWER URINARY TRACT SYMPTOMS: ICD-10-CM

## 2021-04-13 DIAGNOSIS — D64.9 ANEMIA, UNSPECIFIED: ICD-10-CM

## 2021-04-13 DIAGNOSIS — C91.10 CHRONIC LYMPHOCYTIC LEUKEMIA OF B-CELL TYPE NOT HAVING ACHIEVED REMISSION: ICD-10-CM

## 2021-04-13 DIAGNOSIS — I10 ESSENTIAL (PRIMARY) HYPERTENSION: ICD-10-CM

## 2021-04-13 DIAGNOSIS — Z29.9 ENCOUNTER FOR PROPHYLACTIC MEASURES, UNSPECIFIED: ICD-10-CM

## 2021-04-13 LAB
ALBUMIN SERPL ELPH-MCNC: 3.5 G/DL — SIGNIFICANT CHANGE UP (ref 3.3–5)
ALP SERPL-CCNC: 63 U/L — SIGNIFICANT CHANGE UP (ref 40–120)
ALT FLD-CCNC: 10 U/L — SIGNIFICANT CHANGE UP (ref 10–45)
ANION GAP SERPL CALC-SCNC: 14 MMOL/L — SIGNIFICANT CHANGE UP (ref 5–17)
APTT BLD: 26.6 SEC — LOW (ref 27.5–35.5)
AST SERPL-CCNC: 17 U/L — SIGNIFICANT CHANGE UP (ref 10–40)
BASOPHILS # BLD AUTO: 0.02 K/UL — SIGNIFICANT CHANGE UP (ref 0–0.2)
BASOPHILS NFR BLD AUTO: 0.3 % — SIGNIFICANT CHANGE UP (ref 0–2)
BILIRUB SERPL-MCNC: 0.5 MG/DL — SIGNIFICANT CHANGE UP (ref 0.2–1.2)
BUN SERPL-MCNC: 43 MG/DL — HIGH (ref 7–23)
CALCIUM SERPL-MCNC: 9 MG/DL — SIGNIFICANT CHANGE UP (ref 8.4–10.5)
CHLORIDE SERPL-SCNC: 108 MMOL/L — SIGNIFICANT CHANGE UP (ref 96–108)
CK MB CFR SERPL CALC: 2.8 NG/ML — SIGNIFICANT CHANGE UP (ref 0–6.7)
CK SERPL-CCNC: 81 U/L — SIGNIFICANT CHANGE UP (ref 30–200)
CO2 SERPL-SCNC: 17 MMOL/L — LOW (ref 22–31)
CREAT SERPL-MCNC: 1.92 MG/DL — HIGH (ref 0.5–1.3)
EOSINOPHIL # BLD AUTO: 0.02 K/UL — SIGNIFICANT CHANGE UP (ref 0–0.5)
EOSINOPHIL NFR BLD AUTO: 0.3 % — SIGNIFICANT CHANGE UP (ref 0–6)
GLUCOSE SERPL-MCNC: 111 MG/DL — HIGH (ref 70–99)
HCT VFR BLD CALC: 25.7 % — LOW (ref 39–50)
HGB BLD-MCNC: 8.4 G/DL — LOW (ref 13–17)
IMM GRANULOCYTES NFR BLD AUTO: 0.5 % — SIGNIFICANT CHANGE UP (ref 0–1.5)
INR BLD: 1.86 — HIGH (ref 0.88–1.16)
LACTATE SERPL-SCNC: 0.7 MMOL/L — SIGNIFICANT CHANGE UP (ref 0.5–2)
LYMPHOCYTES # BLD AUTO: 3 K/UL — SIGNIFICANT CHANGE UP (ref 1–3.3)
LYMPHOCYTES # BLD AUTO: 46.2 % — HIGH (ref 13–44)
MCHC RBC-ENTMCNC: 31.7 PG — SIGNIFICANT CHANGE UP (ref 27–34)
MCHC RBC-ENTMCNC: 32.7 GM/DL — SIGNIFICANT CHANGE UP (ref 32–36)
MCV RBC AUTO: 97 FL — SIGNIFICANT CHANGE UP (ref 80–100)
MONOCYTES # BLD AUTO: 0.33 K/UL — SIGNIFICANT CHANGE UP (ref 0–0.9)
MONOCYTES NFR BLD AUTO: 5.1 % — SIGNIFICANT CHANGE UP (ref 2–14)
NEUTROPHILS # BLD AUTO: 3.1 K/UL — SIGNIFICANT CHANGE UP (ref 1.8–7.4)
NEUTROPHILS NFR BLD AUTO: 47.6 % — SIGNIFICANT CHANGE UP (ref 43–77)
NRBC # BLD: 0 /100 WBCS — SIGNIFICANT CHANGE UP (ref 0–0)
PLATELET # BLD AUTO: 165 K/UL — SIGNIFICANT CHANGE UP (ref 150–400)
POTASSIUM SERPL-MCNC: 4.5 MMOL/L — SIGNIFICANT CHANGE UP (ref 3.5–5.3)
POTASSIUM SERPL-SCNC: 4.5 MMOL/L — SIGNIFICANT CHANGE UP (ref 3.5–5.3)
PROT SERPL-MCNC: 6.4 G/DL — SIGNIFICANT CHANGE UP (ref 6–8.3)
PROTHROM AB SERPL-ACNC: 21.7 SEC — HIGH (ref 10.6–13.6)
RBC # BLD: 2.65 M/UL — LOW (ref 4.2–5.8)
RBC # FLD: 13.4 % — SIGNIFICANT CHANGE UP (ref 10.3–14.5)
SARS-COV-2 RNA SPEC QL NAA+PROBE: SIGNIFICANT CHANGE UP
SODIUM SERPL-SCNC: 139 MMOL/L — SIGNIFICANT CHANGE UP (ref 135–145)
TROPONIN T SERPL-MCNC: 0.03 NG/ML — HIGH (ref 0–0.01)
WBC # BLD: 6.5 K/UL — SIGNIFICANT CHANGE UP (ref 3.8–10.5)
WBC # FLD AUTO: 6.5 K/UL — SIGNIFICANT CHANGE UP (ref 3.8–10.5)

## 2021-04-13 PROCEDURE — 99285 EMERGENCY DEPT VISIT HI MDM: CPT | Mod: 25

## 2021-04-13 PROCEDURE — 93010 ELECTROCARDIOGRAM REPORT: CPT

## 2021-04-13 PROCEDURE — 74176 CT ABD & PELVIS W/O CONTRAST: CPT | Mod: 26,MA

## 2021-04-13 PROCEDURE — 71045 X-RAY EXAM CHEST 1 VIEW: CPT | Mod: 26

## 2021-04-13 PROCEDURE — 99072 ADDL SUPL MATRL&STAF TM PHE: CPT

## 2021-04-13 PROCEDURE — 71250 CT THORAX DX C-: CPT | Mod: 26,MA

## 2021-04-13 PROCEDURE — 99223 1ST HOSP IP/OBS HIGH 75: CPT | Mod: GC

## 2021-04-13 PROCEDURE — 99214 OFFICE O/P EST MOD 30 MIN: CPT | Mod: 25

## 2021-04-13 RX ORDER — APIXABAN 2.5 MG/1
2.5 TABLET, FILM COATED ORAL EVERY 12 HOURS
Refills: 0 | Status: DISCONTINUED | OUTPATIENT
Start: 2021-04-14 | End: 2021-04-14

## 2021-04-13 RX ORDER — PANTOPRAZOLE SODIUM 20 MG/1
40 TABLET, DELAYED RELEASE ORAL
Refills: 0 | Status: DISCONTINUED | OUTPATIENT
Start: 2021-04-13 | End: 2021-04-17

## 2021-04-13 RX ORDER — AZITHROMYCIN 500 MG/1
500 TABLET, FILM COATED ORAL EVERY 24 HOURS
Refills: 0 | Status: COMPLETED | OUTPATIENT
Start: 2021-04-14 | End: 2021-04-16

## 2021-04-13 RX ORDER — PREGABALIN 225 MG/1
1000 CAPSULE ORAL DAILY
Refills: 0 | Status: DISCONTINUED | OUTPATIENT
Start: 2021-04-13 | End: 2021-04-17

## 2021-04-13 RX ORDER — CEFTRIAXONE 500 MG/1
1000 INJECTION, POWDER, FOR SOLUTION INTRAMUSCULAR; INTRAVENOUS ONCE
Refills: 0 | Status: COMPLETED | OUTPATIENT
Start: 2021-04-13 | End: 2021-04-13

## 2021-04-13 RX ORDER — SODIUM CHLORIDE 9 MG/ML
500 INJECTION INTRAMUSCULAR; INTRAVENOUS; SUBCUTANEOUS ONCE
Refills: 0 | Status: COMPLETED | OUTPATIENT
Start: 2021-04-13 | End: 2021-04-13

## 2021-04-13 RX ORDER — IRON,CARBONYL 45 MG
1 TABLET ORAL
Qty: 0 | Refills: 0 | DISCHARGE

## 2021-04-13 RX ORDER — METOPROLOL TARTRATE 50 MG
10 TABLET ORAL ONCE
Refills: 0 | Status: DISCONTINUED | OUTPATIENT
Start: 2021-04-13 | End: 2021-04-13

## 2021-04-13 RX ORDER — CEFTRIAXONE 500 MG/1
1000 INJECTION, POWDER, FOR SOLUTION INTRAMUSCULAR; INTRAVENOUS EVERY 24 HOURS
Refills: 0 | Status: DISCONTINUED | OUTPATIENT
Start: 2021-04-14 | End: 2021-04-17

## 2021-04-13 RX ORDER — AZITHROMYCIN 500 MG/1
500 TABLET, FILM COATED ORAL ONCE
Refills: 0 | Status: COMPLETED | OUTPATIENT
Start: 2021-04-13 | End: 2021-04-13

## 2021-04-13 RX ORDER — FUROSEMIDE 40 MG
40 TABLET ORAL ONCE
Refills: 0 | Status: DISCONTINUED | OUTPATIENT
Start: 2021-04-13 | End: 2021-04-13

## 2021-04-13 RX ORDER — SODIUM CHLORIDE 9 MG/ML
1000 INJECTION INTRAMUSCULAR; INTRAVENOUS; SUBCUTANEOUS ONCE
Refills: 0 | Status: COMPLETED | OUTPATIENT
Start: 2021-04-13 | End: 2021-04-13

## 2021-04-13 RX ADMIN — AZITHROMYCIN 255 MILLIGRAM(S): 500 TABLET, FILM COATED ORAL at 19:11

## 2021-04-13 RX ADMIN — CEFTRIAXONE 100 MILLIGRAM(S): 500 INJECTION, POWDER, FOR SOLUTION INTRAMUSCULAR; INTRAVENOUS at 18:29

## 2021-04-13 RX ADMIN — SODIUM CHLORIDE 1000 MILLILITER(S): 9 INJECTION INTRAMUSCULAR; INTRAVENOUS; SUBCUTANEOUS at 15:36

## 2021-04-13 RX ADMIN — SODIUM CHLORIDE 500 MILLILITER(S): 9 INJECTION INTRAMUSCULAR; INTRAVENOUS; SUBCUTANEOUS at 19:10

## 2021-04-13 NOTE — H&P ADULT - NSHPPHYSICALEXAM_GEN_ALL_CORE
Vital Signs Last 12 Hrs  T(F): 97.3 (04-13-21 @ 19:46), Max: 98.2 (04-13-21 @ 14:48)  HR: 65 (04-13-21 @ 19:46) (65 - 110)  BP: 104/55 (04-13-21 @ 19:46) (95/51 - 107/53)  BP(mean): --  RR: 18 (04-13-21 @ 19:46) (18 - 22)  SpO2: 96% (04-13-21 @ 19:46) (96% - 100%)    PHYSICAL EXAM:  Constitutional: elderly male, NAD, comfortable in bed.  HEENT: NC/AT, PERRLA, EOMI, no conjunctival pallor or scleral icterus, MMM  Neck: Supple, no JVD  Respiratory: Diminished lung sounds on L lung base. No w/r/r.   Cardiovascular: RRR, normal S1 and S2, no m/r/g.   Gastrointestinal: +BS, soft NTND, no guarding or rebound tenderness, no palpable masses   Extremities: wwp; no cyanosis, clubbing or edema.   Vascular: Pulses equal and strong throughout.   Neurological: AAOx3, no CN deficits, strength and sensation intact throughout.   Skin: No gross skin abnormalities or rashes

## 2021-04-13 NOTE — H&P ADULT - PROBLEM SELECTOR PLAN 3
Continue iron supplementation  Consult Dr. Oliveira history of DVT in bilateral lower extremities in 2019.  - continue on Eliquis 2.5mg BID  - f/u with Dr. Oliveira

## 2021-04-13 NOTE — H&P ADULT - NSICDXPASTSURGICALHX_GEN_ALL_CORE_FT
PAST SURGICAL HISTORY:  No significant past surgical history      PAST SURGICAL HISTORY:  History of prostate surgery     S/P partial colectomy

## 2021-04-13 NOTE — CHART NOTE - NSCHARTNOTEFT_GEN_A_CORE
Patient seen and examined by myself, senior house officer, at 7 pm. Patient had just been admitted to medicine and RN paged medical team to eval at bedside for slight hypotension without tachycardia, stable from time of admission.     Vital Signs Last 24 Hrs  T(C): 36.8 (13 Apr 2021 14:48), Max: 36.8 (13 Apr 2021 14:48)  T(F): 98.2 (13 Apr 2021 14:48), Max: 98.2 (13 Apr 2021 14:48)  HR: 88 (13 Apr 2021 17:56) (70 - 110)  BP: 95/51 (13 Apr 2021 17:56) (95/51 - 107/53)  BP(mean): --  RR: 18 (13 Apr 2021 17:56) (18 - 22)  SpO2: 97% (13 Apr 2021 17:56) (96% - 100%) on 2L NC.     Patient is a very pleasant adult White male, appearing much younger than his stated 89 years; he denies any subjective concerns or complaints other than the dry cough and weight loss which have been a problem for several weeks. Comfortable-appearing, AOx3, cooperative, with good mind for historical detail such [facility]. Euvolemic on exam.     Agree at this time with plan to admit to regional medical floor, will continue to monitor.   - Patient received additional 500 cc of NS while awaiting my eval, will hold off on any further given he appears euvolemic and lactate wnl

## 2021-04-13 NOTE — H&P ADULT - NSICDXFAMILYHX_GEN_ALL_CORE_FT
FAMILY HISTORY:  Father  Still living? Unknown  FH: CAD (coronary artery disease), Age at diagnosis: Age Unknown

## 2021-04-13 NOTE — ED ADULT TRIAGE NOTE - CHIEF COMPLAINT QUOTE
Pt brought in by EMS for new onset Afib. Pt states he was recently dx with PE, has been on Eliquis, but started to feel weak and short of breath over last few days. Denies chest pain, abdominal pain, n/v/d, fevers. Speaking clearly in short sentences.

## 2021-04-13 NOTE — ED ADULT NURSE NOTE - PMH
BPH (benign prostatic hyperplasia)    CAD (coronary artery disease)    CLL (chronic lymphocytic leukemia)    H/O radiculopathy    HTN (hypertension)    PUD (peptic ulcer disease)    Upper GI bleed

## 2021-04-13 NOTE — ED ADULT NURSE NOTE - OBJECTIVE STATEMENT
Pt brought in by EMS for new onset Afib. Pt states he was recently dx with PE in lungs and lower extremities from the [facility]. Pt was placed on Eliquis, but started to feel weak and short of breath over last few days. Pt states SOB becoming worst since his d/c from [facility]. Pt also complains of upper abdomen "discomfort." Discomfort is constant and severity is 3/10. Pt also complains of nausea that comes and goes. no vomiting. Last BM three days ago. Pt also states they performed a colonoscopy during his hospitalization. Unknown results. Speaking clearly in short sentences.

## 2021-04-13 NOTE — H&P ADULT - PROBLEM SELECTOR PLAN 4
Stable  Monitor history of hypertension  - holding home Amlodipine 5mg daily, metoprolol ER 25mg daily in the setting of hypotension, restart at tolerated history of CKD stage 3, Cr on admission 1.92 (baseline 1.3), likely in the setting of infection/hypovolemia. Appropriate UOP. S/p 1.5L NS  - continue to monitor  - avoid nephrotoxic medications

## 2021-04-13 NOTE — ED PROVIDER NOTE - PHYSICAL EXAMINATION
VITAL SIGNS: I have reviewed nursing notes and confirm.  CONSTITUTIONAL: Well-developed; well-nourished; in no acute distress.   SKIN:  Warm and dry, no acute rash.   HEAD:  normocephalic, atraumatic.  EYES: PERRL.  EOM intact; conjunctiva and sclera clear.  ENT: No nasal discharge; airway clear.   NECK: Supple; non tender.  CARD: S1, S2 normal; no murmurs, gallops, or rubs. Regular rate and rhythm.   RESP:  Clear to auscultation b/l, no wheezes, rales or rhonchi.  ABD: Normal bowel sounds; soft; non-distended; non-tender; no guarding/rebound.  EXT: Normal ROM. No clubbing, cyanosis or edema. 2+ pulses to b/l ue/le.  NEURO: Alert, oriented, grossly unremarkable.  5/5 strength x 4 extremities against gravity and external force.  No drift x 4 extremities.  Sensation intact and symmetric x 4 extremities.  No facial asymmetry.    PSYCH: Cooperative, mood and affect appropriate. VITAL SIGNS: I have reviewed nursing notes and confirm.  CONSTITUTIONAL: Well-developed; thin appearing; in no acute distress.   SKIN:  Warm and dry, no acute rash.   HEAD:  normocephalic, atraumatic.  EYES: PERRL.  EOM intact; conjunctiva and sclera clear.  ENT: No nasal discharge; airway clear.   NECK: Supple; non tender.  CARD: S1, S2 normal; no murmurs, gallops, or rubs. Regular rate and rhythm.   RESP:  Clear to auscultation b/l, no wheezes, rales or rhonchi.  ABD: Normal bowel sounds; soft; non-distended; non-tender; no guarding/rebound.  EXT: Normal ROM. No clubbing, cyanosis or edema. 2+ pulses to b/l ue/le.  NEURO: Alert, oriented, grossly unremarkable.  5/5 strength x 4 extremities against gravity and external force.  No drift x 4 extremities.  Sensation intact and symmetric x 4 extremities.  No facial asymmetry.    PSYCH: Cooperative, mood and affect appropriate.

## 2021-04-13 NOTE — HISTORY OF PRESENT ILLNESS
[de-identified] : Patient with CLL here for follow up  [de-identified] : He is tolerating Eliquis well without any bruising or bleeding. CBC is stable, Hb and platelet is stable. 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 He is scheduled to see Dr. Muniz from urology tomorrow. \par \par He is on iron tablets, tolerating well. \par \par Received IV Iron x2...\par \par 11-5-2019 getting pains in his feet when he takes the support stockings off...\par \par May 19, 2020 patient requested a follow-up appointment since he had blood work done last week and he wanted to discuss his results\par \par 1/28/2020 pt returns for f/u...seeing multiple docs...[facility] Sim[facility], even so his total cholesterol is 164 by their results (which are scanned in...) was also told to go back on Aspirin, but pt has low plt, and had 2 bleeding ulcers...\par \par August 10, 2020 returning for follow-up has no complaints... Unable to visit [facility] this summer because of the pandemic... Keeping social distance for most part\par \par August 18, 2020 patient was found to have worsening thrombocytopenia... I do not believe the thrombocytopenia is secondary to his CLL... Therefore went very carefully over his medication list..\par \par January 21, 2021 patient requested a follow-up appointment to discuss his urinary symptoms... He was seen by urology and he was proposed procedure to improve his nocturia... At this point patient has nocturia x4-5 times and is unable to get a good night sleep...\par \par \par February 11,2021...had repeat blood work yesterday...plt low...\par \par March 13, 2021 patient returns to the office stating "total collapse of the systems"... Unable to eat, short of breath, constipated [Other Location: e.g. Home (Enter Location, City,State)___] : at [unfilled]

## 2021-04-13 NOTE — H&P ADULT - NSHPSOCIALHISTORY_GEN_ALL_CORE
Pt denies drug use, social cigarette use 40 years ago but has stopped since then, pt endorses social alcohol use.    Since discharge the patient has had to use a walker to get around and gets easily fatigued walking around his house, whereas previously he was able to go 6-10 blocks with a cane or the assistance of his home health aides, who normally assist him 3x/week. Pt denies drug use. The pt stopped smoking 40 years ago and endorses social smoking before that time; pt endorses social alcohol use.    Since discharge the patient has had to use a walker to get around and gets easily fatigued walking around his house, whereas previously he was able to go 6-10 blocks with a cane or the assistance of his home health aides, who normally assist him 3x/week.

## 2021-04-13 NOTE — ED ADULT NURSE NOTE - NSIMPLEMENTINTERV_GEN_ALL_ED
Implemented All Fall with Harm Risk Interventions:  [place] to call system. Call bell, personal items and telephone within reach. Instruct patient to call for assistance. Room bathroom lighting operational. Non-slip footwear when patient is off stretcher. Physically safe environment: no spills, clutter or unnecessary equipment. Stretcher in lowest position, wheels locked, appropriate side rails in place. Provide visual cue, wrist band, yellow gown, etc. Monitor gait and stability. Monitor for mental status changes and reorient to person, place, and time. Review medications for side effects contributing to fall risk. Reinforce activity limits and safety measures with patient and family. Provide visual clues: red socks.

## 2021-04-13 NOTE — H&P ADULT - ASSESSMENT
Pt is a 89 yo male with a history of CLL, PUD, gout, anemia, CAD, s/p 2 stents (2010), b/l DVT, and PE (on Eliquis), cervical radiculopathy, and colon polyps presenting with acute shortness of breath and new onset atrial fibrillation. Given the positive chest CT, diminished L sided breath sounds, and productive cough, the patient likely has pneumonia in addition to new onset atrial fibrillation as seen on EKG. Pt is a 89 yo male with a history of CLL, PUD, gout, anemia, CAD, s/p 2 stents (2010), b/l DVT, and PE (on Eliquis), cervical radiculopathy, and colon polyps presenting from Dr. Oliveira  with new shortness of breath, loss of appetite, and weight loss for the past. CT chest showing LLL consolidation, admitted for treatment of pneumonia. Pt is a 89 yo male with a history of CLL, PUD, gout, anemia, CAD, s/p 2 stents (2010), b/l DVT, and PE (on Eliquis), cervical radiculopathy, and colon polyps presenting from Dr. Oliveira with new shortness of breath, loss of appetite, and weight loss for the past week. CT chest showing LLL consolidation, admitted for treatment of pneumonia.

## 2021-04-13 NOTE — H&P ADULT - PROBLEM SELECTOR PLAN 7
C/w eliquis 2.5 mg BID history of BPH, follows with urology Dr. Crawford. S/p Urolift procedure for urinary retention due to BPH on 3/22/2021  - holding home finasteride 5mg daily and alfuzosin 10mg daily due to hypotension, restart at tolerated history of anemia, follows with Dr. Oliveira, received multiple transfusions in the past including IV iron, last being in 2019. Hb on admission 8.4 with no signs and symptoms of bleeding.  - f/u iron panel  - Trend H/H and keep active T&S, transfuse if hb<7  - f/u with Dr. Oliveira in the AM history of anemia, follows with Dr. Oliveira, received multiple transfusions in the past including IV iron, last being in 2019. Hb on admission 8.4 (~11 last month) with no signs and symptoms of bleeding.  - f/u iron panel  - Trend H/H and keep active T&S, transfuse if hb<7  - f/u with Dr. Oliveira in the AM

## 2021-04-13 NOTE — H&P ADULT - PROBLEM SELECTOR PLAN 6
No signs of active bleeding.  Monitor. history of anemia, follows with Dr. Oliveira, received multiple transfusions in the past, last being in 2019. Hb on admission 8.4 with no signs and symptoms of bleeding.  - Trend H/H and keep active T&S, transfuse >7. history of CLL, follows with Dr. Oliveira. Currently no receiving any treatment.   - consult Dr. Oliveira in the AM

## 2021-04-13 NOTE — H&P ADULT - HISTORY OF PRESENT ILLNESS
Pt is a 89 yo male with a history of CLL, PUD, gout, anemia, CAD, s/p 2 stents (2010), b/l DVT, and PE (on Eliquis), cervical radiculopathy, and colon polyps who was referred to the ED by his Heme/Onc provider Dr. Oliveira after she noted new onset atrial fibrillation and the patient endorsed new onset SOB for the past few days.    For the [facility]. Pt received a colonscopy and abdominal work up. Since then the patient has endorsed diffulty in mobility as well as an unitentional 4lb weight loss      Pt denies associated chest pain, fever, chills, headache, abdominal pain, nausea, emesis, changes to bowel movements, peripheral edema, rashes, recent travel, known sick contacts or any additional acute complaints or concerns at this time, Pt is a 89 yo male with a history of CLL, PUD, gout, anemia, CAD, s/p 2 stents (2010), b/l DVT, and PE (on Eliquis), cervical radiculopathy, and colon polyps who was referred to the ED by his Heme/Onc provider Dr. Oliveira after she noted new onset atrial fibrillation and the patient endorsed new onset SOB for the past few days.    For the past week [facility]. Pt received a colonscopy and abdominal work up. Since then the patient has endorsed diffulty in mobility as well as an unitentional 4lb weight loss      Pt denies associated chest pain, fever, chills, headache, abdominal pain, nausea, emesis, changes to bowel movements, peripheral edema, rashes, recent travel, known sick contacts or any additional acute complaints or concerns at this time,    ED Course:  Vitals:   Labs:   EKG:   Interventions: Pt is a 89 yo male with a history of CLL, PUD, gout, anemia, CAD, s/p 2 stents (2010), b/l DVT, and PE (on Eliquis), cervical radiculopathy, and colon polyps who was referred to the ED by his Heme/Onc provider Dr. Oliveira after she noted new onset atrial fibrillation and the patient endorsed new onset SOB for the past few days.    The [facility] due to periumbilical pain. Pt received a colonoscopy and abdominal work up which were benign. Since then the patient has endorsed dry cough, difficulty with mobility, and an unintentional 4lb weight loss.    Pt denies associated chest pain, fever, chills, headache, abdominal pain, nausea, emesis, changes to bowel movements, peripheral edema, rashes, recent travel, known sick contacts or any additional acute complaints or concerns at this time,    ED Course:  Vitals: 97/61, , RR 22, T(F) 98.2, SpO2 96%   Labs: HCO3 17, BUN 43, Cr 1.92, Glu 111, Hgb 8.4, Hct 25.7, aPTT 26.6, PTT 21.7, INR 1.86, BNP 2696, Troponin T 0.04  EKG: Atrial fibrilation, rate-limited, Rate 82, Axis normal, NSST changes  Interventions: Patient received 1500mL normal saline and abx (azithromycin 500mg, ceftriaxone 1000mg) during ED course. CXR appears clear without infiltrate or volume overload. CT chest concerning for possible left sided mass.     Pt is a 89 yo male with a history of CLL, PUD, gout, anemia, CAD, s/p 2 stents (2010), b/l DVT, and PE (on Eliquis), cervical radiculopathy, and colon polyps who was referred to the ED by his Heme/Onc provider Dr. Oliveira after she noted new onset atrial fibrillation and the patient endorsed new onset SOB for the past few days.    [facility] due to periumbilical pain. Pt received a colonoscopy and abdominal work up which were unrevealing. Since then the patient has endorsed wet cough with white/yellow sputum, shortness of breath, decreased mobility, and an unintentional 4lb weight loss.    Pt denies associated chest pain, fever, chills, headache, abdominal pain, nausea, emesis, changes to bowel movements, peripheral edema, rashes, recent travel, known sick contacts or any additional acute complaints or concerns at this time,    ED Course:  Vitals: 97/61, , RR 22, T(F) 98.2, SpO2 96%   Labs: HCO3 17, BUN 43, Cr 1.92, Glu 111, Hgb 8.4, Hct 25.7, aPTT 26.6, PTT 21.7, INR 1.86, BNP 2696, Troponin T 0.04  EKG: Atrial fibrilation, rate-limited, Rate 82, Axis normal, NSST changes  Interventions: Patient received 1500mL normal saline and abx (azithromycin 500mg, ceftriaxone 1000mg) during ED course. CXR appears clear without infiltrate or volume overload. CT chest concerning for possible left sided mass.     89M PMH CLL, PUD, anemia, CAD, s/p 2 stents (2010), b/l DVT (2019), on Eliquis, cervical radiculopathy, and colon polyps who was referred to the ED by his Heme/Onc provider Dr. Oliveira after she noted new onset atrial fibrillation and new onset shortness of breath reported by the patient. The [facility] due to periumbilical pain. Pt received a colonoscopy and abdominal work up which were unrevealing. Since then the patient has endorsed wet cough with white/yellow sputum, shortness of breath, decreased mobility, and an unintentional 4lb weight loss. Pt denies associated chest pain, fever, chills, headache, abdominal pain, nausea, emesis, changes to bowel movements, peripheral edema, rashes, recent travel, known sick contacts or any additional acute complaints or concerns at this time. He reports he lives alone and ambulates with a cane. He is compliant with all his medications.     ED Course:  Vitals: T 98.2, , BP 97/61, RR 22, SpO2 96% room air  Labs: wbc 6.5, hb 8.4, plts 165, BUN/Cr 43/1.92, trops peaked 0.04, BNP 2696  EKG: Irregular rhythm, rate controlled, HR 82, qtc 450  CXR clear  CT chest/abdomen: Loculated mild to moderate left pleural effusion. Suspect left lower lobe consolidation superimposed on atelectasis. Focal stenosis of a left lower lobe segmental bronchus also seen. The differential includes rounded atelectasis, acute on chronic aspiration pneumonia and neoplasm. Small-moderate pericardial effusion. Small right pleural effusion.  Interventions: Azithromycin 500mg IV, Ceftriaxone 1000mg IV, and 1.5L NS     89M PMH CLL, PUD, anemia, CAD, s/p 2 stents (2010), b/l DVT (2019) and recent PE (after urolift procedure) on Eliquis, cervical radiculopathy, and colon polyps who was referred to the ED by his Heme/Onc provider Dr. Oliveira after she noted new onset atrial fibrillation and new onset shortness of breath reported by the patient. [facility] due to periumbilical pain. Pt received a colonoscopy and abdominal work up which were unrevealing. Since then the patient has endorsed wet cough with white/yellow sputum, shortness of breath, decreased mobility, and an unintentional 4lb weight loss. Pt denies associated chest pain, fever, chills, headache, abdominal pain, nausea, emesis, changes to bowel movements, peripheral edema, rashes, recent travel, known sick contacts or any additional acute complaints or concerns at this time. He reports he lives alone and ambulates with a cane. He is compliant with all his medications.     ED Course:  Vitals: T 98.2, , BP 97/61, RR 22, SpO2 96% room air  Labs: wbc 6.5, hb 8.4, plts 165, BUN/Cr 43/1.92, trops peaked 0.04, BNP 2696  EKG: Irregular rhythm, rate controlled, HR 82, qtc 450  CXR clear  CT chest/abdomen: Loculated mild to moderate left pleural effusion. Suspect left lower lobe consolidation superimposed on atelectasis. Focal stenosis of a left lower lobe segmental bronchus also seen. The differential includes rounded atelectasis, acute on chronic aspiration pneumonia and neoplasm. Small-moderate pericardial effusion. Small right pleural effusion.  Interventions: Azithromycin 500mg IV, Ceftriaxone 1000mg IV, and 1.5L NS     89M PMH CLL, PUD, anemia, CAD, s/p 2 stents (2010), b/l DVT (2019) and recent PE (after urolift procedure) on Eliquis, cervical radiculopathy, and colon polyps was referred to the ED by his heme/onc provider Dr. Oliveira after she noted new onset atrial fibrillation and shortness of breath. The [facility] due to periumbilical pain. Pt received a colonoscopy and abdominal work up which were unrevealing. Since then the patient has endorsed wet cough with white/yellow sputum, shortness of breath, decreased mobility, and  PO intake associated with his reported 4lb weight loss. Pt denies associated chest pain, fever, chills, headache, abdominal pain, nausea, emesis, changes to bowel movements, peripheral edema, rashes, recent travel, known sick contacts or any additional acute complaints or concerns at this time. He reports he lives alone with part-time home health aides and ambulates with a cane. He is compliant with all his medications.     ED Course:  Vitals: T 98.2, , BP 97/61, RR 22, SpO2 96% room air  Labs: wbc 6.5, hb 8.4, plts 165, BUN/Cr 43/1.92, trops peaked 0.04, BNP 2696  EKG: Irregular rhythm, rate controlled, HR 82, qtc 450  CXR clear  CT chest/abdomen: Loculated mild to moderate left pleural effusion. Suspect left lower lobe consolidation superimposed on atelectasis. Focal stenosis of a left lower lobe segmental bronchus also seen. The differential includes rounded atelectasis, acute on chronic aspiration pneumonia and neoplasm. Small-moderate pericardial effusion. Small right pleural effusion.  Interventions: Azithromycin 500mg IV, Ceftriaxone 1000mg IV, and 1.5L NS     89M PMH CLL, PUD, anemia, CAD, s/p 2 stents (2010), b/l DVT (2019) and recent PE (after urolift procedure) on Eliquis, cervical radiculopathy, and colon polyps was referred to the ED by his heme/onc provider Dr. Oliveira after she noted new onset atrial fibrillation and shortness of breath. The [facility] due to periumbilical pain. Pt received a colonoscopy and abdominal work up which were unrevealing. Since then the patient has endorsed wet cough with white/yellow sputum, shortness of breath, decreased mobility, and  PO intake associated with his reported 4lb weight loss. Pt denies associated chest pain, fever, chills, headache, abdominal pain, nausea, emesis, changes to bowel movements, peripheral edema, rashes, recent travel, known sick contacts or any additional acute complaints or concerns at this time. He reports he lives alone with part-time home health aides and ambulates with a cane. He is compliant with all his medications.     ED Course:  Vitals: T 98.2, , BP 97/61, RR 22, SpO2 96% room air  Labs: wbc 6.5, hb 8.4, plts 165, BUN/Cr 43/1.92, trops peaked 0.04, BNP 2696  EKG: Irregular rhythm, rate controlled, HR 82, qtc 450  CXR: Cardiomegaly, thoracic aortic calcification. Bilateral opacities/pleural effusions. Thoracic spine and bilateral AC joint degenerative changes.  CT chest/abdomen: Loculated mild to moderate left pleural effusion. Suspect left lower lobe consolidation superimposed on atelectasis. Focal stenosis of a left lower lobe segmental bronchus also seen. The differential includes rounded atelectasis, acute on chronic aspiration pneumonia and neoplasm. Small-moderate pericardial effusion. Small right pleural effusion.  Interventions: Azithromycin 500mg IV, Ceftriaxone 1000mg IV, and 1.5L NS

## 2021-04-13 NOTE — H&P ADULT - REASON FOR ADMISSION
atrial fibrillation with rapid ventricular response and shortness of breath Shortness of breath, atrial fibrillation Shortness of breath

## 2021-04-13 NOTE — ED PROVIDER NOTE - OBJECTIVE STATEMENT
89 year old male with history of CLL, PUD , gout, CAD, s/p 2 stents(last in 2010 ), DVT and PE (on Eliquis), cervical radiculopathy , colon polyps presents to ED from Dr. Oliveira's office secondary to concern for new onset atrial fibrillation with rapid ventricular response.  Patient states he has experienced onset of shortness of breath over the past few days, which is new for him.  He denies associated chest pain, fever, chills, headache, abdominal pain, nausea, emesis, changes to bowel movements, peripheral edema, rashes, recent travel, known sick contacts or any additional acute complaints or concerns at this time,

## 2021-04-13 NOTE — H&P ADULT - NSHPLABSRESULTS_GEN_ALL_CORE
LABS:                         8.4    6.50  )-----------( 165      ( 13 Apr 2021 15:17 )             25.7     04-13    139  |  108  |  43<H>  ----------------------------<  111<H>  4.5   |  17<L>  |  1.92<H>    Ca    9.0      13 Apr 2021 15:17  Mg     1.8     04-13    TPro  6.4  /  Alb  3.5  /  TBili  0.5  /  DBili  x   /  AST  17  /  ALT  10  /  AlkPhos  63  04-13    PT/INR - ( 13 Apr 2021 15:17 )   PT: 21.7 sec;   INR: 1.86          PTT - ( 13 Apr 2021 15:17 )  PTT:26.6 sec    CARDIAC MARKERS ( 13 Apr 2021 15:17 )  x     / 0.04 ng/mL / x     / x     / x          Serum Pro-Brain Natriuretic Peptide: 2696 pg/mL (04-13 @ 15:17)    Lactate, Blood: 0.7 mmol/L (04-13 @ 18:35)      RADIOLOGY, EKG & ADDITIONAL TESTS:

## 2021-04-13 NOTE — PATIENT PROFILE ADULT - ABILITY TO HEAR (WITH HEARING AID OR HEARING APPLIANCE IF NORMALLY USED):
with blake aid left and right/Mildly to Moderately Impaired: difficulty hearing in some environments or speaker may need to increase volume or speak distinctly

## 2021-04-13 NOTE — H&P ADULT - PROBLEM SELECTOR PLAN 2
Continue home medication patient diagnosed with PE s/p urolift procedure on 3/22 for BPH with Dr. Crawford. Started on Eliquis. Follows with Dr. Oliveira. Currently breathing well on room air, not tachycardic or hypotensive.  - continue Eliquis 2.5mg BID  - follow up with Dr. Oliveira  - f/u pulm recs patient diagnosed with PE s/p urolift procedure on 3/22 for BPH with Dr. Crawford. Started on Eliquis. Follows with Dr. Oliveira. Currently breathing well on room air, not tachycardic or hypotensive.  - continue Eliquis 2.5mg BID  - f/u d-dimer  - follow up with Dr. Oliveira  - f/u pulm recs

## 2021-04-13 NOTE — H&P ADULT - PROBLEM SELECTOR PLAN 5
C/w finasteride 5mg, alfuzosin 10mg history of CLL, follows with Dr. Oliveira. Currently no receiving any treatment.   - consult Dr. Oliveira in the AM history of hypertension  - holding home Amlodipine 5mg daily, metoprolol ER 25mg daily in the setting of hypotension, restart at tolerated

## 2021-04-13 NOTE — H&P ADULT - NSICDXPASTMEDICALHX_GEN_ALL_CORE_FT
PAST MEDICAL HISTORY:  BPH (benign prostatic hyperplasia)     CAD (coronary artery disease)     CLL (chronic lymphocytic leukemia)     H/O radiculopathy     History of DVT of lower extremity     HTN (hypertension)     PUD (peptic ulcer disease)     Pulmonary embolism     Upper GI bleed

## 2021-04-13 NOTE — ED PROVIDER NOTE - ADMIT DISPOSITION PRESENT ON ADMISSION SEPSIS
Problem: Patient Care Overview  Goal: Plan of Care Review  Outcome: Ongoing (interventions implemented as appropriate)  Patient stable VS over the night, afebrile. Cough medicine administered. Telemetry no ectopy. Free from fall. Will endorse patient to day shift Nurse.        No

## 2021-04-13 NOTE — ED PROVIDER NOTE - CLINICAL SUMMARY MEDICAL DECISION MAKING FREE TEXT BOX
Patient presents to ED with concern for new onset rapid atrial fibrillation detected at Dr. Oliveira's office today.  Per report from charge nurse, patient with rhythm strip showing atrial fibrillation to 110s, also hypotensive on arrival to ED to 80s systolic.  Patient awake, alert and in NAD.  Notes development of SOB over the past several days.  CXR appears clear without infiltrate or volume overload.  EKG showing NSR.  Labs reviewed.  Cr noted to be elevated.  BNP and trop also elevated - likely 2/2 elevated Cr.  CTA chest initially ordered, however unable to obtain due to acute renal insuff.  Given patient's age, concern for new onset atrial fibrillation, sob case is discussed with cardiologist on call, Dr. Villanueva, who accepts patient to cardiac tele.  Patient is aware of plan for admission and in agreement.  Will admit at this time. Patient presents to ED with concern for new onset rapid atrial fibrillation detected at Dr. Oliveira's office today.  Per report from charge nurse, patient with rhythm strip showing atrial fibrillation to 110s, also hypotensive on arrival to ED to 80s systolic.  Patient awake, alert and in NAD.  Notes development of SOB over the past several days.  CXR appears clear without infiltrate or volume overload.  EKG showing NSR.  Labs reviewed.  Cr noted to be elevated.  BNP and trop also elevated - likely 2/2 elevated Cr.  CTA chest initially ordered, however unable to obtain due to acute renal insuff.  Given patient's age, concern for new onset atrial fibrillation, sob case is discussed with cardiologist on call, Dr. Villanueva, who accepts patient to cardiac tele.  Patient is aware of plan for admission and in agreement.  Will admit at this time.    Update @ 1811 - CT chest resulted and concerning for possible left sided mass.  Dr. Villanueva prefers med admission at this time with cardiology team to follow.  Patient remains stable, in NSR on monitor and in NAD.  Saturating 96% on room air.  Will place call to pulm team and admit to medicine, regional with cardiology to consult.  Patient is aware of plan and in agreement.  Will switch to med admission at this time.

## 2021-04-13 NOTE — ED PROVIDER NOTE - NS ED ROS FT
Constitutional: No fever or chills.   Eyes: No pain, blurry vision, or discharge.  ENMT: No hearing changes, pain, discharge or infections. No neck pain or stiffness.  Cardiac: No chest pain, + SOB, no edema. No chest pain with exertion.  Respiratory: + SOB.  No cough or respiratory distress. No hemoptysis. No history of asthma or RAD.  GI: No nausea, vomiting, diarrhea or abdominal pain.  : No dysuria, frequency or burning.  MS: No myalgia, muscle weakness, joint pain or back pain.  Neuro: No headache or weakness. No LOC.  Skin: No skin rash.   Endocrine: No history of thyroid disease or diabetes.  Except as documented in the HPI, all other systems are negative.

## 2021-04-13 NOTE — H&P ADULT - ATTENDING COMMENTS
reviewed pertinent data , h&p  patient seen and examined overnight     1.        rest of  plan as above reviewed pertinent data , h&p  patient seen and examined overnight   Recently dcd from [facility]  dxd with PE /DVT s/p recent prostate procedure     elderly , jovial male NAD  Dry tongue   S1S2  Diminished breath sounds at left base  Abdomen nt/nd/soft  No lower ext edema tenderness b/l    1. loculated pleural effusion: on ceftriaxone/ azithromycin, followup pulm consult recs  2. PE/DVT : c/w AC  3. ISAURA on CKD: s/p 1.5 L NS in ED; encourage PO intake  4. agree w/ holding BP meds, restart prn   5. monitor h/h, transfuse prn, followup anemia workup   rest of  plan as above

## 2021-04-13 NOTE — H&P ADULT - PROBLEM SELECTOR PLAN 8
history of gastric ulcers. Denies any melena or hematochezia  - continue home omeprazole 40mg daily (pantoprazole) history of BPH, follows with urology Dr. Crawford. S/p Urolift procedure for urinary retention due to BPH on 3/22/2021  - holding home finasteride 5mg daily and alfuzosin 10mg daily due to hypotension, restart at tolerated

## 2021-04-13 NOTE — ASSESSMENT
[FreeTextEntry1] :  Patient with CLL, developed PE post his urological procedure... Was placed on Eliquis, and developed severe hematuria...\par \par Most recent blood work from Dr. Hodge's office, all stable...\par \par Patient returns today complaining of severe weakness, short of breath, inability to eat, constipation... We will send patient to [facility] ER for evaluation.

## 2021-04-13 NOTE — H&P ADULT - PROBLEM SELECTOR PLAN 9
F: s/p 1.5L NS  E: Replete K<4, Mg<2  N: DASH/TLC  DVT ppx: Elquis history of gastric ulcers. Denies any melena or hematochezia  - continue home omeprazole 40mg daily (pantoprazole)

## 2021-04-14 ENCOUNTER — NON-APPOINTMENT (OUTPATIENT)
Age: 86
End: 2021-04-14

## 2021-04-14 DIAGNOSIS — Z98.890 OTHER SPECIFIED POSTPROCEDURAL STATES: Chronic | ICD-10-CM

## 2021-04-14 DIAGNOSIS — J90 PLEURAL EFFUSION, NOT ELSEWHERE CLASSIFIED: ICD-10-CM

## 2021-04-14 DIAGNOSIS — Z90.49 ACQUIRED ABSENCE OF OTHER SPECIFIED PARTS OF DIGESTIVE TRACT: Chronic | ICD-10-CM

## 2021-04-14 DIAGNOSIS — D69.6 THROMBOCYTOPENIA, UNSPECIFIED: ICD-10-CM

## 2021-04-14 DIAGNOSIS — D62 ACUTE POSTHEMORRHAGIC ANEMIA: ICD-10-CM

## 2021-04-14 LAB
ALBUMIN SERPL ELPH-MCNC: 3 G/DL — LOW (ref 3.3–5)
ALP SERPL-CCNC: 57 U/L — SIGNIFICANT CHANGE UP (ref 40–120)
ALT FLD-CCNC: 9 U/L — LOW (ref 10–45)
ANION GAP SERPL CALC-SCNC: 13 MMOL/L — SIGNIFICANT CHANGE UP (ref 5–17)
APPEARANCE UR: CLEAR — SIGNIFICANT CHANGE UP
AST SERPL-CCNC: 13 U/L — SIGNIFICANT CHANGE UP (ref 10–40)
BASOPHILS # BLD AUTO: 0.02 K/UL — SIGNIFICANT CHANGE UP (ref 0–0.2)
BASOPHILS NFR BLD AUTO: 0.4 % — SIGNIFICANT CHANGE UP (ref 0–2)
BILIRUB SERPL-MCNC: 0.3 MG/DL — SIGNIFICANT CHANGE UP (ref 0.2–1.2)
BILIRUB UR-MCNC: NEGATIVE — SIGNIFICANT CHANGE UP
BLD GP AB SCN SERPL QL: NEGATIVE — SIGNIFICANT CHANGE UP
BLD GP AB SCN SERPL QL: NEGATIVE — SIGNIFICANT CHANGE UP
BUN SERPL-MCNC: 36 MG/DL — HIGH (ref 7–23)
CALCIUM SERPL-MCNC: 8.6 MG/DL — SIGNIFICANT CHANGE UP (ref 8.4–10.5)
CHLORIDE SERPL-SCNC: 110 MMOL/L — HIGH (ref 96–108)
CO2 SERPL-SCNC: 15 MMOL/L — LOW (ref 22–31)
COLOR SPEC: YELLOW — SIGNIFICANT CHANGE UP
COVID-19 SPIKE DOMAIN AB INTERP: NEGATIVE — SIGNIFICANT CHANGE UP
COVID-19 SPIKE DOMAIN ANTIBODY RESULT: 0.4 U/ML — SIGNIFICANT CHANGE UP
CREAT SERPL-MCNC: 1.61 MG/DL — HIGH (ref 0.5–1.3)
D DIMER BLD IA.RAPID-MCNC: 1979 NG/ML DDU — HIGH
DIFF PNL FLD: NEGATIVE — SIGNIFICANT CHANGE UP
EOSINOPHIL # BLD AUTO: 0.04 K/UL — SIGNIFICANT CHANGE UP (ref 0–0.5)
EOSINOPHIL NFR BLD AUTO: 0.8 % — SIGNIFICANT CHANGE UP (ref 0–6)
FERRITIN SERPL-MCNC: 779 NG/ML — HIGH (ref 30–400)
GLUCOSE SERPL-MCNC: 89 MG/DL — SIGNIFICANT CHANGE UP (ref 70–99)
GLUCOSE UR QL: NEGATIVE — SIGNIFICANT CHANGE UP
HCT VFR BLD CALC: 23.7 % — LOW (ref 39–50)
HGB BLD-MCNC: 7.6 G/DL — LOW (ref 13–17)
IMM GRANULOCYTES NFR BLD AUTO: 0.2 % — SIGNIFICANT CHANGE UP (ref 0–1.5)
IRON SATN MFR SERPL: 10 % — LOW (ref 16–55)
IRON SATN MFR SERPL: 16 UG/DL — LOW (ref 45–165)
KETONES UR-MCNC: ABNORMAL MG/DL
LEUKOCYTE ESTERASE UR-ACNC: NEGATIVE — SIGNIFICANT CHANGE UP
LYMPHOCYTES # BLD AUTO: 2.59 K/UL — SIGNIFICANT CHANGE UP (ref 1–3.3)
LYMPHOCYTES # BLD AUTO: 51.6 % — HIGH (ref 13–44)
MAGNESIUM SERPL-MCNC: 1.8 MG/DL — SIGNIFICANT CHANGE UP (ref 1.6–2.6)
MCHC RBC-ENTMCNC: 31.7 PG — SIGNIFICANT CHANGE UP (ref 27–34)
MCHC RBC-ENTMCNC: 32.1 GM/DL — SIGNIFICANT CHANGE UP (ref 32–36)
MCV RBC AUTO: 98.8 FL — SIGNIFICANT CHANGE UP (ref 80–100)
MONOCYTES # BLD AUTO: 0.28 K/UL — SIGNIFICANT CHANGE UP (ref 0–0.9)
MONOCYTES NFR BLD AUTO: 5.6 % — SIGNIFICANT CHANGE UP (ref 2–14)
NEUTROPHILS # BLD AUTO: 2.08 K/UL — SIGNIFICANT CHANGE UP (ref 1.8–7.4)
NEUTROPHILS NFR BLD AUTO: 41.4 % — LOW (ref 43–77)
NITRITE UR-MCNC: NEGATIVE — SIGNIFICANT CHANGE UP
NRBC # BLD: 0 /100 WBCS — SIGNIFICANT CHANGE UP (ref 0–0)
PH UR: 5.5 — SIGNIFICANT CHANGE UP (ref 5–8)
PHOSPHATE SERPL-MCNC: 3.5 MG/DL — SIGNIFICANT CHANGE UP (ref 2.5–4.5)
PLATELET # BLD AUTO: 130 K/UL — LOW (ref 150–400)
POTASSIUM SERPL-MCNC: 4.4 MMOL/L — SIGNIFICANT CHANGE UP (ref 3.5–5.3)
POTASSIUM SERPL-SCNC: 4.4 MMOL/L — SIGNIFICANT CHANGE UP (ref 3.5–5.3)
PROT SERPL-MCNC: 5.6 G/DL — LOW (ref 6–8.3)
PROT UR-MCNC: NEGATIVE MG/DL — SIGNIFICANT CHANGE UP
RBC # BLD: 2.4 M/UL — LOW (ref 4.2–5.8)
RBC # FLD: 13.2 % — SIGNIFICANT CHANGE UP (ref 10.3–14.5)
RH IG SCN BLD-IMP: NEGATIVE — SIGNIFICANT CHANGE UP
RH IG SCN BLD-IMP: NEGATIVE — SIGNIFICANT CHANGE UP
SARS-COV-2 IGG+IGM SERPL QL IA: 0.4 U/ML — SIGNIFICANT CHANGE UP
SARS-COV-2 IGG+IGM SERPL QL IA: NEGATIVE — SIGNIFICANT CHANGE UP
SODIUM SERPL-SCNC: 138 MMOL/L — SIGNIFICANT CHANGE UP (ref 135–145)
SP GR SPEC: 1.02 — SIGNIFICANT CHANGE UP (ref 1–1.03)
TIBC SERPL-MCNC: 163 UG/DL — LOW (ref 220–430)
UIBC SERPL-MCNC: 147 UG/DL — SIGNIFICANT CHANGE UP (ref 110–370)
UROBILINOGEN FLD QL: 0.2 E.U./DL — SIGNIFICANT CHANGE UP
WBC # BLD: 5.02 K/UL — SIGNIFICANT CHANGE UP (ref 3.8–10.5)
WBC # FLD AUTO: 5.02 K/UL — SIGNIFICANT CHANGE UP (ref 3.8–10.5)

## 2021-04-14 PROCEDURE — 93010 ELECTROCARDIOGRAM REPORT: CPT

## 2021-04-14 PROCEDURE — 99223 1ST HOSP IP/OBS HIGH 75: CPT | Mod: GC

## 2021-04-14 PROCEDURE — 99233 SBSQ HOSP IP/OBS HIGH 50: CPT | Mod: GC

## 2021-04-14 PROCEDURE — 99222 1ST HOSP IP/OBS MODERATE 55: CPT

## 2021-04-14 RX ORDER — MILK THISTLE 150 MG
400 CAPSULE ORAL
Qty: 0 | Refills: 0 | DISCHARGE

## 2021-04-14 RX ORDER — OXYBUTYNIN CHLORIDE 5 MG
1 TABLET ORAL
Qty: 0 | Refills: 0 | DISCHARGE

## 2021-04-14 RX ORDER — FINASTERIDE 5 MG/1
1 TABLET, FILM COATED ORAL
Qty: 0 | Refills: 0 | DISCHARGE

## 2021-04-14 RX ORDER — APIXABAN 2.5 MG/1
1 TABLET, FILM COATED ORAL
Qty: 0 | Refills: 0 | DISCHARGE

## 2021-04-14 RX ORDER — PREGABALIN 225 MG/1
1 CAPSULE ORAL
Qty: 0 | Refills: 0 | DISCHARGE

## 2021-04-14 RX ORDER — LORATADINE 10 MG/1
1 TABLET ORAL
Qty: 0 | Refills: 0 | DISCHARGE

## 2021-04-14 RX ORDER — OMEPRAZOLE 10 MG/1
1 CAPSULE, DELAYED RELEASE ORAL
Qty: 0 | Refills: 0 | DISCHARGE

## 2021-04-14 RX ORDER — ALFUZOSIN HYDROCHLORIDE 10 MG/1
1 TABLET, EXTENDED RELEASE ORAL
Qty: 0 | Refills: 0 | DISCHARGE

## 2021-04-14 RX ORDER — ENOXAPARIN SODIUM 100 MG/ML
50 INJECTION SUBCUTANEOUS EVERY 24 HOURS
Refills: 0 | Status: DISCONTINUED | OUTPATIENT
Start: 2021-04-14 | End: 2021-04-15

## 2021-04-14 RX ORDER — CHOLECALCIFEROL (VITAMIN D3) 125 MCG
0 CAPSULE ORAL
Qty: 0 | Refills: 0 | DISCHARGE

## 2021-04-14 RX ORDER — AMLODIPINE BESYLATE 2.5 MG/1
1 TABLET ORAL
Qty: 0 | Refills: 0 | DISCHARGE

## 2021-04-14 RX ORDER — METOPROLOL TARTRATE 50 MG
1 TABLET ORAL
Qty: 0 | Refills: 0 | DISCHARGE

## 2021-04-14 RX ADMIN — PANTOPRAZOLE SODIUM 40 MILLIGRAM(S): 20 TABLET, DELAYED RELEASE ORAL at 01:22

## 2021-04-14 RX ADMIN — ENOXAPARIN SODIUM 50 MILLIGRAM(S): 100 INJECTION SUBCUTANEOUS at 17:55

## 2021-04-14 RX ADMIN — AZITHROMYCIN 255 MILLIGRAM(S): 500 TABLET, FILM COATED ORAL at 18:37

## 2021-04-14 RX ADMIN — Medication 1 TABLET(S): at 11:12

## 2021-04-14 RX ADMIN — PREGABALIN 1000 MICROGRAM(S): 225 CAPSULE ORAL at 11:12

## 2021-04-14 RX ADMIN — APIXABAN 2.5 MILLIGRAM(S): 2.5 TABLET, FILM COATED ORAL at 05:52

## 2021-04-14 RX ADMIN — CEFTRIAXONE 100 MILLIGRAM(S): 500 INJECTION, POWDER, FOR SOLUTION INTRAMUSCULAR; INTRAVENOUS at 17:55

## 2021-04-14 RX ADMIN — Medication 1 TABLET(S): at 01:21

## 2021-04-14 RX ADMIN — PREGABALIN 1000 MICROGRAM(S): 225 CAPSULE ORAL at 01:21

## 2021-04-14 RX ADMIN — PANTOPRAZOLE SODIUM 40 MILLIGRAM(S): 20 TABLET, DELAYED RELEASE ORAL at 07:36

## 2021-04-14 NOTE — CONSULT NOTE ADULT - PROBLEM SELECTOR RECOMMENDATION 9
Transfuse 1 u of PRBC...GI to consult to origin of blood loss...will start pt on IV Iron Venofer, to minimize need for transfusion...

## 2021-04-14 NOTE — DIETITIAN INITIAL EVALUATION ADULT. - PROBLEM SELECTOR PLAN 6
history of CLL, follows with Dr. Oliveira. Currently no receiving any treatment.   - consult Dr. Oliveira in the AM

## 2021-04-14 NOTE — PROGRESS NOTE ADULT - PROBLEM SELECTOR PLAN 3
History of DVT in bilateral lower extremities in 2019.  - continue on Eliquis 2.5mg BID  - f/u with Dr. Oliveira History of DVT in bilateral lower extremities in 2019.   - continue on Eliquis 2.5mg BID  - f/u with Dr. Oliveira  - f/u LE doppler

## 2021-04-14 NOTE — CONSULT NOTE ADULT - ASSESSMENT
89 y/o [place] man, Hx of CLL on no therapy, had recent bladder surgery, complicated by PE and Hematuria, placed on AC with Eliquis 2.5 mg BID...Presnted here yesterday with extreme fatigue, hypotensive...found to have Hb of 8.5gr/dl, later 7,4...cause of Hb drop unclear...

## 2021-04-14 NOTE — CONSULT NOTE ADULT - ASSESSMENT
per Internal Medicine    89 yo male with a history of CLL, PUD, gout, anemia, CAD, s/p 2 stents (2010), b/l DVT, and PE (on Eliquis), cervical radiculopathy, and colon polyps presenting from Dr. Oliveira with new shortness of breath, loss of appetite, and weight loss for the past week. CT chest showing LLL consolidation, admitted for treatment of pneumonia.    Problem/Plan - 1:  ·  Problem: Loculated pleural effusion.  Plan: Presenting with symptoms of shortness of breath for the past week. Endorses difficulty breathing while ambulating and cough with yellow phlegm. Denies fevers and chills. Afebrile on admission with tachycardia to 110, hypotensive to 97/61. S/p 1.5L NS and ceftriaxone and azithromycin.  - CXR with cardiomegaly, thoracic aortic calcification and bilateral opacities/pleural effusions  - CT chest showing loculated mild to moderate left pleural effusion. Suspect left lower lobe consolidation superimposed on atelectasis. Focal stenosis of a left lower lobe segmental bronchus also seen. Differentials included atelectasis, pneumonia and neoplasm.  - low concern for PE given patient on Eliquis, not hypoxic, not tachycardic, and hypotension resolving with fluids.  - continue CAP treatment  - pulm consulted- will f/u recs.     Problem/Plan - 2:  ·  Problem: Pulmonary embolism.  Plan: Patient diagnosed with PE s/p urolift procedure on 3/22 for BPH with Dr. Crawford. Started on Eliquis (from Jun-Nov 2019 after first PE and then again on 4/7/21 after [facility]nd [facility] Follows with Dr. Oliveira. Currently breathing well on room air, not tachycardic or hypotensive.  - continue Eliquis 2.5mg BID  - f/u d-dimer  - follow up with Dr. Oliveira  - f/u pulm recs.    Problem/Plan - 3:  ·  Problem: DVT (deep venous thrombosis).  Plan: History of DVT in bilateral lower extremities in 2019.   - continue on Eliquis 2.5mg BID  - f/u with Dr. Oliveira  - f/u LE doppler.    Problem/Plan - 4:  ·  Problem: Acute kidney injury superimposed on CKD.  Plan: History of CKD stage 3, Cr on admission 1.92 (baseline 1.3), likely in the setting of hypovolemia. Appropriate UOP. S/p 1.5L NS  - encourage PO intake  - continue to monitor s/p hydration and increased PO intake  - avoid nephrotoxic medications.     Problem/Plan - 5:  ·  Problem: HTN (hypertension).  Plan: history of hypertension  - holding home Amlodipine 5mg daily, metoprolol ER 25mg daily in the setting of hypotension, restart at tolerated.     Problem/Plan - 6:  Problem: CLL (chronic lymphocytic leukemia). Plan: history of CLL, follows with Dr. Oliveira. Currently no receiving any treatment.   - consult Dr. Oliveira in the AM.    Problem/Plan - 7:  ·  Problem: Anemia.  Plan: History of anemia, follows with Dr. Oliveira, received multiple transfusions in the past including IV iron, last being in 2019. Hb on admission 8.4 (~11 last month) with no signs and symptoms of bleeding.  - f/u iron panel  - Trend H/H and keep active T&S, transfuse if hb<7  - f/u with Dr. Oliveira in the AM.     Problem/Plan - 8:  ·  Problem: BPH (benign prostatic hyperplasia).  Plan: History of BPH, follows with urology Dr. Crawford. S/p Urolift procedure for urinary retention due to BPH on 3/22/2021  - holding home finasteride 5mg daily and alfuzosin 10mg daily due to hypotension, restart at tolerated.     Problem/Plan - 9:  ·  Problem: PUD (peptic ulcer disease).  Plan: History of gastric ulcers. Denies any melena or hematochezia  - continue home omeprazole 40mg daily (pantoprazole).     Problem/Plan - 10:  Problem: Prophylactic measure. Plan; F: s/p 1.5L NS  E: Replete K<4, Mg<2  N: DASH/TLC  DVT ppx: Eliquis.

## 2021-04-14 NOTE — DIETITIAN INITIAL EVALUATION ADULT. - ORAL INTAKE PTA/DIET HISTORY
a per patient he resides alone and up to a week ago was able to go to market by himself(WITH [employer]HA) and do a shopping and cooking.He reported that since D/C from [facility] has poor appetite and feels he is not eating well enough.Expressed wishes to "gain weight" and "eat better"

## 2021-04-14 NOTE — PHYSICAL THERAPY INITIAL EVALUATION ADULT - GENERAL OBSERVATIONS, REHAB EVAL
Pt. received supine in bed in no acute distress, +[place] cath, bed alarm. Pt. states breathing is better, but still gets a bit SOB with activity.

## 2021-04-14 NOTE — DIETITIAN INITIAL EVALUATION ADULT. - PROBLEM SELECTOR PLAN 2
patient diagnosed with PE s/p urolift procedure on 3/22 for BPH with Dr. Crawford. Started on Eliquis. Follows with Dr. Oliveira. Currently breathing well on room air, not tachycardic or hypotensive.  - continue Eliquis 2.5mg BID  - f/u d-dimer  - follow up with Dr. Oliveira  - f/u pulm recs

## 2021-04-14 NOTE — DIETITIAN INITIAL EVALUATION ADULT. - PROBLEM SELECTOR PLAN 8
history of BPH, follows with urology Dr. Crawford. S/p Urolift procedure for urinary retention due to BPH on 3/22/2021  - holding home finasteride 5mg daily and alfuzosin 10mg daily due to hypotension, restart at tolerated

## 2021-04-14 NOTE — DIETITIAN INITIAL EVALUATION ADULT. - PROBLEM SELECTOR PLAN 5
history of hypertension  - holding home Amlodipine 5mg daily, metoprolol ER 25mg daily in the setting of hypotension, restart at tolerated

## 2021-04-14 NOTE — DIETITIAN INITIAL EVALUATION ADULT. - PHYSCIAL ASSESSMENT
thin frail elderly male with weight loss/visible loss of fat and muscle Per physical assessment: Muscle Wasting- Temporal [   ]  Clavicle/Pectoral [ x  ]  Shoulder/Deltoid [   ]  Scapula [  x ]  Interosseous [   ]  Quadriceps [  x ]  Gastrocnemius [   ]; Fat Wasting- Orbital [   ]  Buccal [   ]  Triceps [ x  ]  Rib [   ]--> Suspect  moderate[PCM] 2/2 to physical assessment, [poor intake], and [wt loss]; please see malnutrition chart note./underweight/debilitated

## 2021-04-14 NOTE — PHYSICAL THERAPY INITIAL EVALUATION ADULT - ADDITIONAL COMMENTS
Pt. lives in elevator building with few steps to enter or can access ramp through basement to elevator. Pt. uses no device indoors and rollator outdoors. Has grab bars  and seat in shower, raised toilet seat and tub rail. Pt. has home health aid 3 days a week for 3-4 hours to assist with household ADL.

## 2021-04-14 NOTE — CONSULT NOTE ADULT - ASSESSMENT
***Incomplete Note***    Patient is a 89 yo M, 40 pk yr smoking history, PMHx CLL, PUD, anemia, CAD, s/p 2 stents (2010), b/l DVT (2019) and recent PE (after urolift procedure) on Eliquis, cervical radiculopathy, and colon polyps was referred to the ED by his heme/onc provider Dr. Oliveira after she noted new onset atrial fibrillation and shortness of breath.    #Left lower lobe consolidation vs atelectasis  CT with left lower lobe consolidation vs compressive atelectasis from left pleural effusion. Patient without fevers, elevated WBC, change in chronic sputum production so not c/w pneumonia, but patient also has CLL and may have blunted immune response to infection.    -can continue Abx for now    #Left lower lobe segmental bronchus stenosis  Patient with left lower lobe segmental bronchus stenosis, differentials include external compression from enlarged lymph node vs mass.   -patient may benefit from thoracentesis to rule out malignant effusion    #Left pleural effusion, pericardial effusion  Patient with left pleural effusion and pericardial effusion, reports he was told about these [facility] but no history prior to this. Also has pericardial effusion. Differentials include parapneumonic vs malignant vs 2/2 serositis. Effusion is not large but may be partially contributing to patients symptoms of dyspnea. Pericardial effusion does not appear to show tamponade physiology.  -recommend Echo    #PE  Patient with recent history of provoked PE following prostate surgery. No change in HR or O2 requirements. SOB symptoms likely do not represent Eliquis failure.     Pulm will follow   Patient is a 89 yo M, 40 pk yr smoking history, PMHx CLL, PUD, anemia, CAD, s/p 2 stents (2010), b/l DVT (2019) and recent PE (after urolift procedure) on Eliquis, cervical radiculopathy, and colon polyps was referred to the ED by his heme/onc provider Dr. Oliveira after she noted new onset atrial fibrillation and shortness of breath.    #Left lower lobe consolidation vs atelectasis  CT with left lower lobe consolidation vs compressive atelectasis from left pleural effusion. Patient without fevers, elevated WBC, change in chronic sputum production so not c/w pneumonia, but patient also has CLL and may have blunted immune response to infection.    -can continue Abx for now    #Left lower lobe segmental bronchus stenosis  Patient with left lower lobe segmental bronchus stenosis, differentials include external compression from enlarged lymph node vs mass.   -patient may benefit from thoracentesis to rule out malignant effusion  -please hold Eliquis, will plan for thora Friday after being off eliquis for 48 hours    #Left pleural effusion, pericardial effusion  Patient with left pleural effusion and pericardial effusion, reports he was told about these [facility] but no history prior to this. Also has pericardial effusion. Differentials include parapneumonic vs malignant vs 2/2 serositis. Effusion is not large but may be partially contributing to patients symptoms of dyspnea. Pericardial effusion does not appear to show tamponade physiology.  -recommend Echo    #PE  Patient with recent history of provoked PE following prostate surgery. No change in HR or O2 requirements. SOB symptoms likely do not represent Eliquis failure.   -please hold eliquis for thoracentesis  -can start heparin guttae or lovenox     Pulm will follow

## 2021-04-14 NOTE — PHYSICAL THERAPY INITIAL EVALUATION ADULT - PERTINENT HX OF CURRENT PROBLEM, REHAB EVAL
89 yo male with a history of CLL, PUD, gout, anemia, CAD, s/p 2 stents (2010), b/l DVT, and PE (on Eliquis), cervical radiculopathy, and colon polyps presenting from Dr. Oliveira with new shortness of breath, loss of appetite, and weight loss for the past week. CT chest showing LLL consolidation, admitted for treatment of pneumonia.

## 2021-04-14 NOTE — DIETITIAN NUTRITION RISK NOTIFICATION - TREATMENT: THE FOLLOWING DIET HAS BEEN RECOMMENDED
Diet, Regular:   Supplement Feeding Modality:  Oral  Ensure Enlive Cans or Servings Per Day:  1       Frequency:  Three Times a day (04-14-21 @ 12:30) [Active]

## 2021-04-14 NOTE — DIETITIAN INITIAL EVALUATION ADULT. - PROBLEM SELECTOR PLAN 4
history of CKD stage 3, Cr on admission 1.92 (baseline 1.3), likely in the setting of infection/hypovolemia. Appropriate UOP. S/p 1.5L NS  - continue to monitor  - avoid nephrotoxic medications

## 2021-04-14 NOTE — DIETITIAN INITIAL EVALUATION ADULT. - PROBLEM SELECTOR PLAN 3
history of DVT in bilateral lower extremities in 2019.  - continue on Eliquis 2.5mg BID  - f/u with Dr. Oliveira

## 2021-04-14 NOTE — PROGRESS NOTE ADULT - SUBJECTIVE AND OBJECTIVE BOX
OVERNIGHT EVENTS: DIANA.    SUBJECTIVE / INTERVAL HPI: Patient seen and examined at bedside. Patient states that he feels okay and just wants to increase his appetite and PO intake while in the hospital. Patient denies: fever, chills, weakness, dizziness, headaches, changes in vision, chest pain, palpitations, shortness of breath, cough, N/V, diarrhea or constipation, dysuria, LE edema.     VITAL SIGNS:  Vital Signs Last 24 Hrs  T(C): 36.4 (2021 06:33), Max: 36.8 (2021 14:48)  T(F): 97.5 (2021 06:33), Max: 98.2 (2021 14:48)  HR: 73 (2021 06:33) (65 - 110)  BP: 105/56 (2021 06:33) (95/51 - 107/53)  BP(mean): --  RR: 17 (2021 06:33) (17 - 22)  SpO2: 95% (2021 06:33) (95% - 100%)    PHYSICAL EXAM:    General: Male who appears much younger than stated age, NAD  HEENT: NC/AT; PERRL, anicteric sclera; MM dry  Neck: supple  Cardiovascular: +S1/S2; irregular rate and rhythm  Respiratory: CTA B/L; no W/R/R  Gastrointestinal: soft, NT/ND; +BSx4  : [place] cath in place  Extremities: WWP; no edema. no tenderness but R calf > L calf  Vascular: 2+ radial, DP/PT pulses B/L  Neurological: AAOx3; no focal deficits    MEDICATIONS:  MEDICATIONS  (STANDING):  apixaban 2.5 milliGRAM(s) Oral every 12 hours  azithromycin  IVPB 500 milliGRAM(s) IV Intermittent every 24 hours  cefTRIAXone   IVPB 1000 milliGRAM(s) IV Intermittent every 24 hours  cyanocobalamin 1000 MICROGram(s) Oral daily  multivitamin 1 Tablet(s) Oral daily  pantoprazole    Tablet 40 milliGRAM(s) Oral before breakfast    MEDICATIONS  (PRN):      ALLERGIES:  Allergies    No Known Allergies    Intolerances        LABS:                        7.6    5.02  )-----------( 130      ( 2021 07:40 )             23.7     04-14    138  |  110<H>  |  36<H>  ----------------------------<  89  4.4   |  15<L>  |  1.61<H>    Ca    8.6      2021 07:40  Phos  3.5     -  Mg     1.8     -14    TPro  5.6<L>  /  Alb  3.0<L>  /  TBili  0.3  /  DBili  x   /  AST  13  /  ALT  9<L>  /  AlkPhos  57  04-14    PT/INR - ( 2021 15:17 )   PT: 21.7 sec;   INR: 1.86          PTT - ( 2021 15:17 )  PTT:26.6 sec  Urinalysis Basic - ( 2021 08:52 )    Color: Yellow / Appearance: Clear / S.020 / pH: x  Gluc: x / Ketone: Trace mg/dL  / Bili: Negative / Urobili: 0.2 E.U./dL   Blood: x / Protein: NEGATIVE mg/dL / Nitrite: NEGATIVE   Leuk Esterase: NEGATIVE / RBC: x / WBC x   Sq Epi: x / Non Sq Epi: x / Bacteria: x      CAPILLARY BLOOD GLUCOSE          RADIOLOGY & ADDITIONAL TESTS: Reviewed.

## 2021-04-14 NOTE — PROGRESS NOTE ADULT - PROBLEM SELECTOR PLAN 2
Patient diagnosed with PE s/p urolift procedure on 3/22 for BPH with Dr. Crawford. Started on Eliquis (unknown time- questionably 2019 vs again on last DVT/PE dx 2 weeks ago). Follows with Dr. Oliveira. Currently breathing well on room air, not tachycardic or hypotensive.  - continue Eliquis 2.5mg BID  - f/u d-dimer  - follow up with Dr. Oliveira  - f/u pulm recs Patient diagnosed with PE s/p urolift procedure on 3/22 for BPH with Dr. Crawford. Started on Eliquis (from Jun-Nov 2019 after first PE and then again on 4/7/21 after [facility]nd [facility] Follows with Dr. Oliveira. Currently breathing well on room air, not tachycardic or hypotensive.  - continue Eliquis 2.5mg BID  - f/u d-dimer  - follow up with Dr. Oliveira  - f/u pulm recs

## 2021-04-14 NOTE — CONSULT NOTE ADULT - ATTENDING COMMENTS
Patient seen and examined with house-staff during bedside rounds.  Resident note read, including vitals, physical findings, laboratory data, and radiological reports.   Revisions included below.  Direct personal management at bed side and extensive interpretation of the data.  Plan was outlined and discussed in details with the housestaff.  Decision making of high complexity  Action taken for acute disease activity to reflect the level of care provided:  - medication reconciliation  - review laboratory data  Hold anticoagulation.  Thoracentesis on Friday.  I explained the procedure t the patient

## 2021-04-14 NOTE — DIETITIAN INITIAL EVALUATION ADULT. - PROBLEM SELECTOR PLAN 1
presenting with symptoms of shortness of breath for the past week. Endorses difficulty breathing while ambulating and cough with yellow phlegm. Denies fevers and chills. Afebrile on admission with tachycardia to 110, hypotensive to 97/61. S/p 1.5L NS and ceftriaxone and azithromycin.  - CXR with cardiomegaly, thoracic aortic calcification and bilateral opacities/pleural effusions  - CT chest showing loculated mild to moderate left pleural effusion. Suspect left lower lobe consolidation superimposed on atelectasis. Focal stenosis of a left lower lobe segmental bronchus also seen. Differentials included atelectasis, pneumonia and neoplasm.  - low concern for PE given patient on Eliquis, not hypoxic, not tachycardic, and hypotension resolving with fluids.  - continue CAP treatment  - pulm consulted

## 2021-04-14 NOTE — DIETITIAN INITIAL EVALUATION ADULT. - PROBLEM SELECTOR PLAN 7
history of anemia, follows with Dr. Oliveira, received multiple transfusions in the past including IV iron, last being in 2019. Hb on admission 8.4 (~11 last month) with no signs and symptoms of bleeding.  - f/u iron panel  - Trend H/H and keep active T&S, transfuse if hb<7  - f/u with Dr. Oliveira in the AM

## 2021-04-14 NOTE — DIETITIAN INITIAL EVALUATION ADULT. - OTHER INFO
89y/o male with history of CLL, PVD, Gout, Anemia, CAD s/p stent ,B/L DVT, PE, Cervical radiculopathy ,Colon polyps ,weight loss admitted with AFib, OB and vocalized PE. Per patient, feels anxious about eating due to [facility] due to "horrible food" .Now with no N/V/D/C or pain.BM4/12,Skin intact .Per [facility] admission weight history: April2019:55.4kg,Now 51kg,Stated UBW "years ago":135lbs.Noted weight loss of 10lbs since April 2019.Patient is 76% of IBW of 148lbs.Eating more than 60% from trays. Patient requesting Ensure supplements. Per Physical assessment and weight loss patient is at moderate malnutrition risk due to loss of muscle/fat/slight clavicle protrusion/10lb weight loss and suspected inconsistent energy intake .Will honor food requests. Patient expressed his frustration over not feeling himself since [facility] the [facility].Wants to be able to walk to market and do for himself.

## 2021-04-14 NOTE — DIETITIAN INITIAL EVALUATION ADULT. - PROBLEM SELECTOR PLAN 9
history of gastric ulcers. Denies any melena or hematochezia  - continue home omeprazole 40mg daily (pantoprazole)

## 2021-04-15 ENCOUNTER — TRANSCRIPTION ENCOUNTER (OUTPATIENT)
Age: 86
End: 2021-04-15

## 2021-04-15 DIAGNOSIS — Z86.718 PERSONAL HISTORY OF OTHER VENOUS THROMBOSIS AND EMBOLISM: ICD-10-CM

## 2021-04-15 LAB
ALBUMIN SERPL ELPH-MCNC: 3.6 G/DL — SIGNIFICANT CHANGE UP (ref 3.3–5)
ALP SERPL-CCNC: 67 U/L — SIGNIFICANT CHANGE UP (ref 40–120)
ALT FLD-CCNC: 12 U/L — SIGNIFICANT CHANGE UP (ref 10–45)
ANION GAP SERPL CALC-SCNC: 12 MMOL/L — SIGNIFICANT CHANGE UP (ref 5–17)
AST SERPL-CCNC: 17 U/L — SIGNIFICANT CHANGE UP (ref 10–40)
BASOPHILS # BLD AUTO: 0.03 K/UL — SIGNIFICANT CHANGE UP (ref 0–0.2)
BASOPHILS NFR BLD AUTO: 0.4 % — SIGNIFICANT CHANGE UP (ref 0–2)
BILIRUB SERPL-MCNC: 0.7 MG/DL — SIGNIFICANT CHANGE UP (ref 0.2–1.2)
BUN SERPL-MCNC: 25 MG/DL — HIGH (ref 7–23)
CALCIUM SERPL-MCNC: 9.1 MG/DL — SIGNIFICANT CHANGE UP (ref 8.4–10.5)
CHLORIDE SERPL-SCNC: 110 MMOL/L — HIGH (ref 96–108)
CO2 SERPL-SCNC: 19 MMOL/L — LOW (ref 22–31)
CREAT SERPL-MCNC: 1.46 MG/DL — HIGH (ref 0.5–1.3)
CULTURE RESULTS: SIGNIFICANT CHANGE UP
EOSINOPHIL # BLD AUTO: 0.08 K/UL — SIGNIFICANT CHANGE UP (ref 0–0.5)
EOSINOPHIL NFR BLD AUTO: 1.1 % — SIGNIFICANT CHANGE UP (ref 0–6)
GLUCOSE SERPL-MCNC: 116 MG/DL — HIGH (ref 70–99)
HCT VFR BLD CALC: 31.7 % — LOW (ref 39–50)
HGB BLD-MCNC: 10.4 G/DL — LOW (ref 13–17)
IMM GRANULOCYTES NFR BLD AUTO: 0.4 % — SIGNIFICANT CHANGE UP (ref 0–1.5)
LYMPHOCYTES # BLD AUTO: 4.56 K/UL — HIGH (ref 1–3.3)
LYMPHOCYTES # BLD AUTO: 60.6 % — HIGH (ref 13–44)
MAGNESIUM SERPL-MCNC: 1.8 MG/DL — SIGNIFICANT CHANGE UP (ref 1.6–2.6)
MCHC RBC-ENTMCNC: 31.6 PG — SIGNIFICANT CHANGE UP (ref 27–34)
MCHC RBC-ENTMCNC: 32.8 GM/DL — SIGNIFICANT CHANGE UP (ref 32–36)
MCV RBC AUTO: 96.4 FL — SIGNIFICANT CHANGE UP (ref 80–100)
MONOCYTES # BLD AUTO: 0.31 K/UL — SIGNIFICANT CHANGE UP (ref 0–0.9)
MONOCYTES NFR BLD AUTO: 4.1 % — SIGNIFICANT CHANGE UP (ref 2–14)
NEUTROPHILS # BLD AUTO: 2.51 K/UL — SIGNIFICANT CHANGE UP (ref 1.8–7.4)
NEUTROPHILS NFR BLD AUTO: 33.4 % — LOW (ref 43–77)
NRBC # BLD: 0 /100 WBCS — SIGNIFICANT CHANGE UP (ref 0–0)
PHOSPHATE SERPL-MCNC: 2.9 MG/DL — SIGNIFICANT CHANGE UP (ref 2.5–4.5)
PLATELET # BLD AUTO: 186 K/UL — SIGNIFICANT CHANGE UP (ref 150–400)
POTASSIUM SERPL-MCNC: 4 MMOL/L — SIGNIFICANT CHANGE UP (ref 3.5–5.3)
POTASSIUM SERPL-SCNC: 4 MMOL/L — SIGNIFICANT CHANGE UP (ref 3.5–5.3)
PROT SERPL-MCNC: 6.5 G/DL — SIGNIFICANT CHANGE UP (ref 6–8.3)
RBC # BLD: 3.29 M/UL — LOW (ref 4.2–5.8)
RBC # FLD: 13.7 % — SIGNIFICANT CHANGE UP (ref 10.3–14.5)
SODIUM SERPL-SCNC: 141 MMOL/L — SIGNIFICANT CHANGE UP (ref 135–145)
SPECIMEN SOURCE: SIGNIFICANT CHANGE UP
WBC # BLD: 7.52 K/UL — SIGNIFICANT CHANGE UP (ref 3.8–10.5)
WBC # FLD AUTO: 7.52 K/UL — SIGNIFICANT CHANGE UP (ref 3.8–10.5)

## 2021-04-15 PROCEDURE — 93970 EXTREMITY STUDY: CPT | Mod: 26

## 2021-04-15 PROCEDURE — 99232 SBSQ HOSP IP/OBS MODERATE 35: CPT

## 2021-04-15 PROCEDURE — 93306 TTE W/DOPPLER COMPLETE: CPT | Mod: 26

## 2021-04-15 PROCEDURE — 99222 1ST HOSP IP/OBS MODERATE 55: CPT

## 2021-04-15 PROCEDURE — 99233 SBSQ HOSP IP/OBS HIGH 50: CPT | Mod: GC

## 2021-04-15 RX ORDER — IRON SUCROSE 20 MG/ML
200 INJECTION, SOLUTION INTRAVENOUS EVERY 24 HOURS
Refills: 0 | Status: DISCONTINUED | OUTPATIENT
Start: 2021-04-15 | End: 2021-04-17

## 2021-04-15 RX ORDER — MAGNESIUM SULFATE 500 MG/ML
2 VIAL (ML) INJECTION ONCE
Refills: 0 | Status: COMPLETED | OUTPATIENT
Start: 2021-04-15 | End: 2021-04-15

## 2021-04-15 RX ADMIN — PANTOPRAZOLE SODIUM 40 MILLIGRAM(S): 20 TABLET, DELAYED RELEASE ORAL at 05:36

## 2021-04-15 RX ADMIN — PREGABALIN 1000 MICROGRAM(S): 225 CAPSULE ORAL at 11:06

## 2021-04-15 RX ADMIN — Medication 1 TABLET(S): at 11:06

## 2021-04-15 RX ADMIN — Medication 200 MILLIGRAM(S): at 05:46

## 2021-04-15 RX ADMIN — IRON SUCROSE 110 MILLIGRAM(S): 20 INJECTION, SOLUTION INTRAVENOUS at 21:34

## 2021-04-15 RX ADMIN — AZITHROMYCIN 255 MILLIGRAM(S): 500 TABLET, FILM COATED ORAL at 18:30

## 2021-04-15 RX ADMIN — Medication 50 GRAM(S): at 19:34

## 2021-04-15 RX ADMIN — CEFTRIAXONE 100 MILLIGRAM(S): 500 INJECTION, POWDER, FOR SOLUTION INTRAMUSCULAR; INTRAVENOUS at 17:51

## 2021-04-15 NOTE — PROGRESS NOTE ADULT - PROBLEM SELECTOR PLAN 3
History of DVT in bilateral lower extremities in 2019.   - continue on Eliquis 2.5mg BID  - f/u with Dr. Oliveira  - f/u LE doppler

## 2021-04-15 NOTE — DISCHARGE NOTE PROVIDER - NSDCHHCONTACT_GEN_ALL_CORE_FT
Schedule lab in 6 weeks. Thanks!  
As certified below, I, or a nurse practitioner or physician assistant working with me, had a face-to-face encounter that meets the physician face-to-face encounter requirements.

## 2021-04-15 NOTE — DISCHARGE NOTE PROVIDER - DETAILS OF MALNUTRITION DIAGNOSIS/DIAGNOSES
This patient has been assessed with a concern for Malnutrition and was treated during this hospitalization for the following Nutrition diagnosis/diagnoses:     -  04/14/2021: Moderate protein-calorie malnutrition   -  04/14/2021: Underweight (BMI < 19)

## 2021-04-15 NOTE — SWALLOW BEDSIDE ASSESSMENT ADULT - NS SPL SWALLOW CLINIC TRIAL FT
Pt stated that he has been throat-clearing at baseline t/o the day and does not feel as though the PO is going "the wrong way" but given sensation that "somethings stuck," he would appreciate further evaluation.

## 2021-04-15 NOTE — SWALLOW BEDSIDE ASSESSMENT ADULT - SLP GENERAL OBSERVATIONS
Pt was received sleeping but woke easily. baseline throat-clear noted. Expressive and receptive language were judged to be WFL on the conversational level.

## 2021-04-15 NOTE — CONSULT NOTE ADULT - ASSESSMENT
89M PMH CLL, PUD, anemia, CAD, s/p 2 stents (2010), b/l DVT (2019) and recent PE (after urolift procedure) on Eliquis, cervical radiculopathy, and colon polyps was referred to the ED by his heme/onc provider Dr. Oliveira after she noted new onset atrial fibrillation and shortness of breath. GI consulted for anemia.     #Anemia  - normocytic, iron saturation suggestive of BRIGIDA, but elevated ferritin most likely 2/2 acute phase reactant   - [facility], [facility] unrevealing  - not having melena or hematochezia  - planned for thoracentesis per Pulmonary team tomorrow  - will defer endoscopic evaluation to outpatient setting most likely, but will monitor counts and stool    Omer Sexton MD  PGY-4, Gastroenterology Fellow  pager: [phone]

## 2021-04-15 NOTE — DISCHARGE NOTE PROVIDER - NSDCMRMEDTOKEN_GEN_ALL_CORE_FT
alfuzosin 10 mg oral tablet, extended release: 1 tab(s) orally once a day  amLODIPine 5 mg oral tablet: 1 tab(s) orally once a day  apixaban 2.5 mg oral tablet: 1 tab(s) orally 2 times a day  Claritin 10 mg oral tablet: 1 tab(s) orally once a day  finasteride 5 mg oral tablet: 1 tab(s) orally once a day  metoprolol succinate 25 mg oral tablet, extended release: 1 tab(s) orally once a day  Multiple Vitamins oral capsule: 1 cap(s) orally once a day  omeprazole 40 mg oral delayed release capsule: 1 cap(s) orally once a day  oxybutynin 5 mg oral tablet: 1 tab(s) orally 3 times a day x 10 days  Turmeric: 400 milligram(s) orally once a day  Vitamin B-12 1000 mcg oral tablet: 1 tab(s) orally once a day  Vitamin D3 5000 intl units (125 mcg) oral capsule:    alfuzosin 10 mg oral tablet, extended release: 1 tab(s) orally once a day  amLODIPine 5 mg oral tablet: 1 tab(s) orally once a day  apixaban 2.5 mg oral tablet: 1 tab(s) orally 2 times a day  cefpodoxime 200 mg oral tablet: Please take 1 tablet the evening of Saturday, 4/16/2021    M2B   7 uris 7614-02  D/C 4/17/21 @ 9am  [facility] [phone]  Claritin 10 mg oral tablet: 1 tab(s) orally once a day  finasteride 5 mg oral tablet: 1 tab(s) orally once a day  metoprolol succinate 25 mg oral tablet, extended release: 1 tab(s) orally once a day  Multiple Vitamins oral capsule: 1 cap(s) orally once a day  omeprazole 40 mg oral delayed release capsule: 1 cap(s) orally once a day  oxybutynin 5 mg oral tablet: 1 tab(s) orally 3 times a day x 10 days  Turmeric: 400 milligram(s) orally once a day  Vitamin B-12 1000 mcg oral tablet: 1 tab(s) orally once a day  Vitamin D3 5000 intl units (125 mcg) oral capsule:    alfuzosin 10 mg oral tablet, extended release: 1 tab(s) orally once a day  amLODIPine 5 mg oral tablet: 1 tab(s) orally once a day  apixaban 2.5 mg oral tablet: 1 tab(s) orally 2 times a day  cefpodoxime 200 mg oral tablet: Please take 1 tablet the evening of Saturday, 4/16/2021    M2B   7 uris 7614-02  D/C 4/17/21 @ 9am  [facility] [phone]  Claritin 10 mg oral tablet: 1 tab(s) orally once a day  ferrous sulfate 325 mg (65 mg elemental iron) oral tablet: 1 tab(s) orally once a day   finasteride 5 mg oral tablet: 1 tab(s) orally once a day  metoprolol succinate 25 mg oral tablet, extended release: 1 tab(s) orally once a day  Multiple Vitamins oral capsule: 1 cap(s) orally once a day  omeprazole 40 mg oral delayed release capsule: 1 cap(s) orally once a day  oxybutynin 5 mg oral tablet: 1 tab(s) orally 3 times a day x 10 days  Turmeric: 400 milligram(s) orally once a day  Vitamin B-12 1000 mcg oral tablet: 1 tab(s) orally once a day  Vitamin D3 5000 intl units (125 mcg) oral capsule:

## 2021-04-15 NOTE — DISCHARGE NOTE PROVIDER - NSDCFUSCHEDAPPT_GEN_ALL_CORE_FT
BRIAN COLLIER ; 04/20/2021 ; [facility] [address] BRIAN COLLIER ; 04/20/2021 ; [facility] [address]  BRIAN COLLIER ; 07/15/2021 ; [facility] [address]

## 2021-04-15 NOTE — DISCHARGE NOTE PROVIDER - NSDCFUADDAPPT_GEN_ALL_CORE_FT
Please bring your Insurance card, Photo ID and Discharge paperwork to the following appointment:    (1) Please follow up with Primary Care Provider, Dr. Jonatan Cox at [address] [place] [address] on 04/20/2021 at 12:20pm.    Appointment was scheduled by Ms. YAMILA Pascual, Referral Coordinator. Please bring your Insurance card, Photo ID and Discharge paperwork to the following appointment:    (1) Please follow up with Primary Care Provider, Dr. Jonatan Cox at [address] [place] [address] on 04/20/2021 at 12:20pm.    Appointment was scheduled by Ms. YAMILA Pascual, Referral Coordinator.    Please follow up with Dr. Bill, we tried to make an appointment with her office.  However were unable to reach her. Please call the office to make the appointment.

## 2021-04-15 NOTE — DISCHARGE NOTE PROVIDER - CARE PROVIDER_API CALL
Jonatan Cox  INTERNAL MEDICINE  [address] [place] [id number]  Phone: [phone]  Fax: [phone]  Established Patient  Follow Up Time: 1 week   Jonatan Cox.  INTERNAL MEDICINE  [address] [place] [address]  Phone: [phone]  Fax: [phone]  Scheduled Appointment: 04/20/2021 12:20 PM   Jonatan Cox.  INTERNAL MEDICINE  [address] [address]  Phone: [phone]  Fax: [phone]  Scheduled Appointment: 04/20/2021 12:20 PM    Val Bill)  Critical Care Medicine; Internal Medicine; Pulmonary Disease  [address] [place] [address]  Phone: [phone]  Fax: [phone]  Follow Up Time: 2 weeks

## 2021-04-15 NOTE — DISCHARGE NOTE PROVIDER - CARE PROVIDERS DIRECT ADDRESSES
,[email or web address] ,DirectAddress_Unknown ,DirectAddress_Unknown,[email or web address]

## 2021-04-15 NOTE — PROGRESS NOTE ADULT - SUBJECTIVE AND OBJECTIVE BOX
HEALTH ISSUES - PROBLEM Dx:  Shortness of breath  Shortness of breath    HTN (hypertension)  HTN (hypertension)    CLL (chronic lymphocytic leukemia)  CLL (chronic lymphocytic leukemia)    Anemia  Anemia    BPH (benign prostatic hyperplasia)  BPH (benign prostatic hyperplasia)    PUD (peptic ulcer disease)  PUD (peptic ulcer disease)    Prophylactic measure  Prophylactic measure    Pulmonary embolism  Pulmonary embolism    DVT (deep venous thrombosis)  DVT (deep venous thrombosis)    Acute kidney injury superimposed on CKD  Acute kidney injury superimposed on CKD    Loculated pleural effusion  Loculated pleural effusion    Anemia due to acute blood loss  Anemia due to acute blood loss    Thrombocytopenia  Thrombocytopenia            CHEMOTHERAPY REGIMEN:        Day:                          Diet:  Protocol:                                    IVF:      MEDICATIONS  (STANDING):  azithromycin  IVPB 500 milliGRAM(s) IV Intermittent every 24 hours  cefTRIAXone   IVPB 1000 milliGRAM(s) IV Intermittent every 24 hours  cyanocobalamin 1000 MICROGram(s) Oral daily  magnesium sulfate  IVPB 2 Gram(s) IV Intermittent once  multivitamin 1 Tablet(s) Oral daily  pantoprazole    Tablet 40 milliGRAM(s) Oral before breakfast    MEDICATIONS  (PRN):  guaiFENesin   Syrup  (Sugar-Free) 200 milliGRAM(s) Oral every 6 hours PRN Cough      Allergies    No Known Allergies    Intolerances        DVT Prophylaxis: [ ] YES [ ] NO      Antibiotics: [ ] YES [ ] NO    Pain Scale (1-10):       Location:    Vital Signs Last 24 Hrs  T(C): 36.7 (15 Apr 2021 16:30), Max: 36.8 (15 Apr 2021 06:35)  T(F): 98.1 (15 Apr 2021 16:30), Max: 98.2 (15 Apr 2021 06:35)  HR: 73 (15 Apr 2021 16:30) (72 - 88)  BP: 141/63 (15 Apr 2021 16:30) (107/55 - 141/63)  BP(mean): --  RR: 17 (15 Apr 2021 16:30) (16 - 19)  SpO2: 95% (15 Apr 2021 16:30) (93% - 96%)    Drug Dosing Weight  Height (cm): 170.2 (2021 22:34)  Weight (kg): 51 (2021 22:34)  BMI (kg/m2): 17.6 (2021 22:34)  BSA (m2): 1.58 (2021 22:34)     Physical Exam:  · Constitutional	detailed exam  · Constitutional Details	well-developed; well-groomed  · Eyes	EOMI; PERRL; no drainage or redness  · ENMT Comments	dry mucous membranes  · Respiratory	detailed exam  · Respiratory Comments	normal breath sounds at the lung bases bilaterally  · Cardiovascular	Regular rate & rhythm, normal S1, S2; no murmurs, gallops or rubs; no S3, S4  · Abd-Soft non tender  ·Ext-no edema, clubbing or cyanosis    URINARY CATHETER: [ ] YES [ ] NO     LABS:  CBC Full  -  ( 15 Apr 2021 09:39 )  WBC Count : 7.52 K/uL  RBC Count : 3.29 M/uL  Hemoglobin : 10.4 g/dL  Hematocrit : 31.7 %  Platelet Count - Automated : 186 K/uL  Mean Cell Volume : 96.4 fl  Mean Cell Hemoglobin : 31.6 pg  Mean Cell Hemoglobin Concentration : 32.8 gm/dL  Auto Neutrophil # : 2.51 K/uL  Auto Lymphocyte # : 4.56 K/uL  Auto Monocyte # : 0.31 K/uL  Auto Eosinophil # : 0.08 K/uL  Auto Basophil # : 0.03 K/uL  Auto Neutrophil % : 33.4 %  Auto Lymphocyte % : 60.6 %  Auto Monocyte % : 4.1 %  Auto Eosinophil % : 1.1 %  Auto Basophil % : 0.4 %    04-15    141  |  110<H>  |  25<H>  ----------------------------<  116<H>  4.0   |  19<L>  |  1.46<H>    Ca    9.1      15 Apr 2021 09:39  Phos  2.9     04-15  Mg     1.8     04-15    TPro  6.5  /  Alb  3.6  /  TBili  0.7  /  DBili  x   /  AST  17  /  ALT  12  /  AlkPhos  67  04-15      Urinalysis Basic - ( 2021 08:52 )    Color: Yellow / Appearance: Clear / S.020 / pH: x  Gluc: x / Ketone: Trace mg/dL  / Bili: Negative / Urobili: 0.2 E.U./dL   Blood: x / Protein: NEGATIVE mg/dL / Nitrite: NEGATIVE   Leuk Esterase: NEGATIVE / RBC: x / WBC x   Sq Epi: x / Non Sq Epi: x / Bacteria: x        CULTURES:    RADIOLOGY & ADDITIONAL STUDIES:

## 2021-04-15 NOTE — CONSULT NOTE ADULT - ATTENDING COMMENTS
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 GI consulted for n/v, abd pain.     #R/o cannabis hyperemesis syndrome  - improves significantly w/ hot showers, responded to increased dose of zofran  - counseled on marijuana cessation  - can trial sumatriptan or aprepitant if not responding to zofran #Anemia  - normocytic, iron saturation suggestive of BRIGIDA, but elevated ferritin most likely 2/2 acute phase reactant   - [facility], per report  - no evidence of active bleed  - planned for thoracentesis per Pulmonary team tomorrow  - will defer endoscopic evaluation to outpatient setting most likely, but will monitor counts and stool

## 2021-04-15 NOTE — PROGRESS NOTE ADULT - SUBJECTIVE AND OBJECTIVE BOX
OVERNIGHT EVENTS: DIANA.    SUBJECTIVE / INTERVAL HPI: Patient seen and examined at bedside. Patient states that his breathing feels better, however he has not really had a return of his appetite. BM x2 yesterday. Denies all other ROS.     VITAL SIGNS:  Vital Signs Last 24 Hrs  T(C): 36.8 (15 Apr 2021 06:35), Max: 36.8 (15 Apr 2021 06:35)  T(F): 98.2 (15 Apr 2021 06:35), Max: 98.2 (15 Apr 2021 06:35)  HR: 88 (15 Apr 2021 06:35) (72 - 88)  BP: 118/67 (15 Apr 2021 06:35) (107/55 - 122/61)  BP(mean): --  RR: 19 (15 Apr 2021 06:35) (16 - 19)  SpO2: 96% (15 Apr 2021 06:35) (93% - 96%)    PHYSICAL EXAM:    General: Male who appears much younger than stated age, NAD  HEENT: NC/AT; PERRL, anicteric sclera; MM dry  Neck: supple  Cardiovascular: +S1/S2; irregular rate and rhythm  Respiratory: CTA B/L; Decreased breath sound L lung base  Gastrointestinal: soft, NT/ND; +BSx4  : [place] cath in place  Extremities: WWP; no edema. no tenderness but R calf > L calf  Vascular: 2+ radial, DP/PT pulses B/L  Neurological: AAOx3; no focal deficits    MEDICATIONS:  MEDICATIONS  (STANDING):  azithromycin  IVPB 500 milliGRAM(s) IV Intermittent every 24 hours  cefTRIAXone   IVPB 1000 milliGRAM(s) IV Intermittent every 24 hours  cyanocobalamin 1000 MICROGram(s) Oral daily  enoxaparin Injectable 50 milliGRAM(s) SubCutaneous every 24 hours  magnesium sulfate  IVPB 2 Gram(s) IV Intermittent once  multivitamin 1 Tablet(s) Oral daily  pantoprazole    Tablet 40 milliGRAM(s) Oral before breakfast    MEDICATIONS  (PRN):  guaiFENesin   Syrup  (Sugar-Free) 200 milliGRAM(s) Oral every 6 hours PRN Cough      ALLERGIES:  Allergies    No Known Allergies    Intolerances        LABS:                        10.4   7.52  )-----------( 186      ( 15 Apr 2021 09:39 )             31.7     04-15    141  |  110<H>  |  25<H>  ----------------------------<  116<H>  4.0   |  19<L>  |  1.46<H>    Ca    9.1      15 Apr 2021 09:39  Phos  2.9     -15  Mg     1.8     15    TPro  6.5  /  Alb  3.6  /  TBili  0.7  /  DBili  x   /  AST  17  /  ALT  12  /  AlkPhos  67  -15    PT/INR - ( 2021 15:17 )   PT: 21.7 sec;   INR: 1.86          PTT - ( 2021 15:17 )  PTT:26.6 sec  Urinalysis Basic - ( 2021 08:52 )    Color: Yellow / Appearance: Clear / S.020 / pH: x  Gluc: x / Ketone: Trace mg/dL  / Bili: Negative / Urobili: 0.2 E.U./dL   Blood: x / Protein: NEGATIVE mg/dL / Nitrite: NEGATIVE   Leuk Esterase: NEGATIVE / RBC: x / WBC x   Sq Epi: x / Non Sq Epi: x / Bacteria: x      CAPILLARY BLOOD GLUCOSE          RADIOLOGY & ADDITIONAL TESTS: Reviewed.

## 2021-04-15 NOTE — SWALLOW BEDSIDE ASSESSMENT ADULT - MUCOSAL QUALITY
Voice was judged to be of good intensity. Cued cough was also judged to be of functional strength. Moist, clean oral mucosa.

## 2021-04-15 NOTE — DISCHARGE NOTE PROVIDER - PROVIDER TOKENS
PROVIDER:[TOKEN:[[id number]],FOLLOWUP:[1 week],ESTABLISHEDPATIENT:[T]] FREE:[LAST:[Kenny],FIRST:[Jonatan MORALES],PHONE:[phone]],FAX:[phone]],ADDRESS:[INTERNAL MEDICINE  [address] [place] [address]],SCHEDULEDAPPT:[04/20/2021],SCHEDULEDAPPTTIME:[12:20 PM]] FREE:[LAST:[Kenny],FIRST:[Jonatan MORALES],PHONE:[phone]],FAX:[phone]],ADDRESS:[INTERNAL MEDICINE  [address] [place] [address]],SCHEDULEDAPPT:[04/20/2021],SCHEDULEDAPPTTIME:[12:20 PM]],PROVIDER:[TOKEN:[[id number]],FOLLOWUP:[2 weeks]]

## 2021-04-15 NOTE — SWALLOW BEDSIDE ASSESSMENT ADULT - PHARYNGEAL PHASE
Laryngeal elevation palpated. Throat-clear after all trials and pt reported feeling "something stuck"

## 2021-04-15 NOTE — PROGRESS NOTE ADULT - PROBLEM SELECTOR PLAN 2
Patient diagnosed with PE s/p urolift procedure on 3/22 for BPH with Dr. Crawford. Started on Eliquis (from Jun-Nov 2019 after first PE and then again on 4/7/21 after 2nd PE [facility]). Follows with Dr. Oliveira. Currently breathing well on room air, not tachycardic or hypotensive.  - continue Eliquis 2.5mg BID  - f/u d-dimer  - follow up with Dr. Oliveira  - Pulm recs as above

## 2021-04-15 NOTE — SWALLOW BEDSIDE ASSESSMENT ADULT - SWALLOW EVAL: DIAGNOSIS
Pt p/w concern for pharyngeal dysphagia given observation of throat-clear w PO trials and pt complaints of feeling "something stuck," recommend further evaluation of swallow physiology w  a FEES.

## 2021-04-15 NOTE — CONSULT NOTE ADULT - SUBJECTIVE AND OBJECTIVE BOX
GASTROENTEROLOGY CONSULT NOTE  HPI:  89M PMH CLL, PUD, anemia, CAD, s/p 2 stents (2010), b/l DVT (2019) and recent PE (after urolift procedure) on Eliquis, cervical radiculopathy, and colon polyps was referred to the ED by his heme/onc provider Dr. Oliveira after she noted new onset atrial fibrillation and shortness of breath. [facility] due to periumbilical pain. Pt received a colonoscopy and abdominal work up which were unrevealing. Since then the patient has endorsed wet cough with white/yellow sputum, shortness of breath, decreased mobility, and  PO intake associated with his reported 4lb weight loss. Pt denies associated chest pain, fever, chills, headache, abdominal pain, nausea, emesis, changes to bowel movements, peripheral edema, rashes, recent travel, known sick contacts or any additional acute complaints or concerns at this time. He reports he lives alone with part-time home health aides and ambulates with a cane. He is compliant with all his medications.     ED Course:  Vitals: T 98.2, , BP 97/61, RR 22, SpO2 96% room air  Labs: wbc 6.5, hb 8.4, plts 165, BUN/Cr 43/1.92, trops peaked 0.04, BNP 2696  EKG: Irregular rhythm, rate controlled, HR 82, qtc 450  CXR: Cardiomegaly, thoracic aortic calcification. Bilateral opacities/pleural effusions. Thoracic spine and bilateral AC joint degenerative changes.  CT chest/abdomen: Loculated mild to moderate left pleural effusion. Suspect left lower lobe consolidation superimposed on atelectasis. Focal stenosis of a left lower lobe segmental bronchus also seen. The differential includes rounded atelectasis, acute on chronic aspiration pneumonia and neoplasm. Small-moderate pericardial effusion. Small right pleural effusion.  Interventions: Azithromycin 500mg IV, Ceftriaxone 1000mg IV, and 1.5L NS   (2021 20:54)    GI consulted for anemia. Patient seen and examined at bedside.     Allergies    No Known Allergies    Intolerances      Home Medications:  alfuzosin 10 mg oral tablet, extended release: 1 tab(s) orally once a day (2021 16:36)  amLODIPine 5 mg oral tablet: 1 tab(s) orally once a day (2021 16:36)  apixaban 2.5 mg oral tablet: 1 tab(s) orally 2 times a day (2021 16:36)  Claritin 10 mg oral tablet: 1 tab(s) orally once a day (2021 16:36)  finasteride 5 mg oral tablet: 1 tab(s) orally once a day (2021 16:36)  metoprolol succinate 25 mg oral tablet, extended release: 1 tab(s) orally once a day (2021 16:36)  Multiple Vitamins oral capsule: 1 cap(s) orally once a day (2021 16:36)  omeprazole 40 mg oral delayed release capsule: 1 cap(s) orally once a day (2021 16:36)  oxybutynin 5 mg oral tablet: 1 tab(s) orally 3 times a day x 10 days (2021 16:36)  Turmeric: 400 milligram(s) orally once a day (2021 16:36)  Vitamin B-12 1000 mcg oral tablet: 1 tab(s) orally once a day (2021 16:32)  Vitamin D3 5000 intl units (125 mcg) oral capsule:  (2021 16:32)    MEDICATIONS:  MEDICATIONS  (STANDING):  azithromycin  IVPB 500 milliGRAM(s) IV Intermittent every 24 hours  cefTRIAXone   IVPB 1000 milliGRAM(s) IV Intermittent every 24 hours  cyanocobalamin 1000 MICROGram(s) Oral daily  magnesium sulfate  IVPB 2 Gram(s) IV Intermittent once  multivitamin 1 Tablet(s) Oral daily  pantoprazole    Tablet 40 milliGRAM(s) Oral before breakfast    MEDICATIONS  (PRN):  guaiFENesin   Syrup  (Sugar-Free) 200 milliGRAM(s) Oral every 6 hours PRN Cough    PAST MEDICAL & SURGICAL HISTORY:  HTN (hypertension)    Upper GI bleed    PUD (peptic ulcer disease)    CAD (coronary artery disease)    CLL (chronic lymphocytic leukemia)    H/O radiculopathy    BPH (benign prostatic hyperplasia)    History of DVT of lower extremity    Pulmonary embolism    S/P partial colectomy    History of prostate surgery      FAMILY HISTORY:  FH: CAD (coronary artery disease) (Father)      SOCIAL HISTORY:  Tobacco: [ ] Current, [ ] Former, [ ] Never; Pack Years:  Alcohol:  Illicit Drugs:    REVIEW OF SYSTEMS:  CONSTITUTIONAL: No weakness, fevers or chills  HEENT: No visual changes; No vertigo or throat pain   NECK: No pain or stiffness  RESPIRATORY: No cough, wheezing, hemoptysis; No shortness of breath  CARDIOVASCULAR: No chest pain or palpitations  GASTROINTESTINAL: As above.  GENITOURINARY: No dysuria, frequency or hematuria  NEUROLOGICAL: No numbness or weakness  SKIN: No itching, burning, rashes, or lesions   All other 10 review of systems is negative unless indicated above.    Vital Signs Last 24 Hrs  T(C): 36.7 (15 Apr 2021 16:30), Max: 36.8 (15 Apr 2021 06:35)  T(F): 98.1 (15 Apr 2021 16:), Max: 98.2 (15 Apr 2021 06:35)  HR: 73 (15 Apr 2021 16:30) (72 - 88)  BP: 141/63 (15 Apr 2021 16:30) (107/55 - 141/63)  BP(mean): --  RR: 17 (15 Apr 2021 16:30) (16 - 19)  SpO2: 95% (15 Apr 2021 16:30) (93% - 96%)    14 @ 07:  -  04-15 @ 07:00  --------------------------------------------------------  IN: 0 mL / OUT: 700 mL / NET: -700 mL    04-15 @ 07:01  -  04-15 @ 17:23  --------------------------------------------------------  IN: 0 mL / OUT: 625 mL / NET: -625 mL        PHYSICAL EXAM:    General: Well developed; well nourished; in no acute distress  Eyes: Anicteric sclerae, moist conjunctivae  HENT: Moist mucous membranes  Neck: Trachea midline, supple  Lungs: increased respiratory effort, no intercostal retractions  Cardiovascular: RRR  Abdomen: Soft, non-tender non-distended; Normal bowel sounds; No rebound or guarding  Extremities: Normal range of motion, No clubbing, cyanosis or edema  Neurological: Alert and oriented x3  Skin: Warm and dry. No obvious rash    LABS:                        10.4   7.52  )-----------( 186      ( 15 Apr 2021 09:39 )             31.7     04-15    141  |  110<H>  |  25<H>  ----------------------------<  116<H>  4.0   |  19<L>  |  1.46<H>    Ca    9.1      15 Apr 2021 09:39  Phos  2.9     04-15  Mg     1.8     -15    TPro  6.5  /  Alb  3.6  /  TBili  0.7  /  DBili  x   /  AST  17  /  ALT  12  /  AlkPhos  67  04-15            Culture - Urine (collected 2021 08:58)  Source: .Urine None  Final Report (15 Apr 2021 08:15):    Insignificant amount of mixed growth.      RADIOLOGY & ADDITIONAL STUDIES:     Reviewed
PULMONARY SERVICE INITIAL CONSULT NOTE    HPI:  Patient is a 89 yo M, 40 pk yr smoking history, PMHx CLL, PUD, anemia, CAD, s/p 2 stents (), b/l DVT (2019) and recent PE (after urolift procedure) on Eliquis, cervical radiculopathy, and colon polyps was referred to the ED by his heme/onc provider Dr. Oliveira after she noted new onset atrial fibrillation and shortness of breath. [facility] due [facility] periumbilical pain. Pt received a colonoscopy and abdominal work up which were unrevealing. Patient states he has felt more SOB since [facility] was [facility], stating he noticed a particular difference after he had his colonoscopy. Since discharge he has felt generalized weakness and becomes SOB after walking a few feet when he states he previously was walking many blocks with his HHA. Also reports decreased appetite and reduced PO intake with recent 4lb weight loss. He has had a chronic cough for years, sometimes productive of white to yellow sputum but states this has been unchanged from baseline. Otherwise denies fever, chills, lightheadedness, CP, abdominal pain, LE edema. Pulm consulted for SOB and abnormal findings on chest CT.     ED Course:  Vitals: T 98.2, , BP 97/61, RR 22, SpO2 96% room air  Labs: wbc 6.5, hb 8.4, plts 165, BUN/Cr 43/1.92, trops peaked 0.04, BNP 2696  EKG: Irregular rhythm, rate controlled, HR 82, qtc 450  CXR: Cardiomegaly, thoracic aortic calcification. Bilateral opacities/pleural effusions. Thoracic spine and bilateral AC joint degenerative changes.  CT chest/abdomen: Loculated mild to moderate left pleural effusion. Suspect left lower lobe consolidation superimposed on atelectasis. Focal stenosis of a left lower lobe segmental bronchus also seen. The differential includes rounded atelectasis, acute on chronic aspiration pneumonia and neoplasm. Small-moderate pericardial effusion. Small right pleural effusion.  Interventions: Azithromycin 500mg IV, Ceftriaxone 1000mg IV, and 1.5L NS     (2021 20:54)    REVIEW OF SYSTEMS:  All additional ROS negative    PAST MEDICAL & SURGICAL HISTORY:  HTN (hypertension)    Upper GI bleed    PUD (peptic ulcer disease)    CAD (coronary artery disease)    CLL (chronic lymphocytic leukemia)    H/O radiculopathy    BPH (benign prostatic hyperplasia)    History of DVT of lower extremity    Pulmonary embolism    S/P partial colectomy    History of prostate surgery    FAMILY HISTORY:  FH: CAD (coronary artery disease) (Father)    SOCIAL HISTORY:  Smoking Status: [ ] Current, [x ] Former, [ ] Never  Pack Years: 40 pk/yr    MEDICATIONS:  Pulmonary:    Antimicrobials:  azithromycin  IVPB 500 milliGRAM(s) IV Intermittent every 24 hours  cefTRIAXone   IVPB 1000 milliGRAM(s) IV Intermittent every 24 hours    Anticoagulants:  apixaban 2.5 milliGRAM(s) Oral every 12 hours    Onc:    GI/:  pantoprazole    Tablet 40 milliGRAM(s) Oral before breakfast    Endocrine:    Cardiac:    Other Medications:  cyanocobalamin 1000 MICROGram(s) Oral daily  multivitamin 1 Tablet(s) Oral daily    Allergies    No Known Allergies    Intolerances    Vital Signs Last 24 Hrs  T(C): 36.4 (2021 06:33), Max: 36.8 (2021 14:48)  T(F): 97.5 (2021 06:33), Max: 98.2 (2021 14:48)  HR: 73 (2021 06:33) (65 - 110)  BP: 105/56 (2021 06:33) (95/51 - 107/53)  BP(mean): --  RR: 17 (2021 06:33) (17 - 22)  SpO2: 95% (2021 06:33) (95% - 100%)    PHYSICAL EXAM:  Constitutional: Cachectic  Head: NC/AT  EENT: PERRL, anicteric sclera; oropharynx clear, MMM  Neck: supple, no appreciable JVD  Respiratory: no W/R/R, bronchial breath sounds at left lung base  Cardiovascular: +S1/S2, RRR  Gastrointestinal: soft, NT/ND  Extremities: WWP; no edema, clubbing or cyanosis  Vascular: 2+ radial pulses B/L  Neurological: awake and alert; COCHRAN    LABS:  CBC Full  -  ( 2021 07:40 )  WBC Count : 5.02 K/uL  RBC Count : 2.40 M/uL  Hemoglobin : 7.6 g/dL  Hematocrit : 23.7 %  Platelet Count - Automated : 130 K/uL  Mean Cell Volume : 98.8 fl  Mean Cell Hemoglobin : 31.7 pg  Mean Cell Hemoglobin Concentration : 32.1 gm/dL  Auto Neutrophil # : 2.08 K/uL  Auto Lymphocyte # : 2.59 K/uL  Auto Monocyte # : 0.28 K/uL  Auto Eosinophil # : 0.04 K/uL  Auto Basophil # : 0.02 K/uL  Auto Neutrophil % : 41.4 %  Auto Lymphocyte % : 51.6 %  Auto Monocyte % : 5.6 %  Auto Eosinophil % : 0.8 %  Auto Basophil % : 0.4 %        138  |  110<H>  |  36<H>  ----------------------------<  89  4.4   |  15<L>  |  1.61<H>    Ca    8.6      2021 07:40  Phos  3.5       Mg     1.8         TPro  5.6<L>  /  Alb  3.0<L>  /  TBili  0.3  /  DBili  x   /  AST  13  /  ALT  9<L>  /  AlkPhos  57  -14  PT/INR - ( 2021 15:17 )   PT: 21.7 sec;   INR: 1.86     PTT - ( 2021 15:17 )  PTT:26.6 sec    Urinalysis Basic - ( 2021 08:52 )  Color: Yellow / Appearance: Clear / S.020 / pH: x  Gluc: x / Ketone: Trace mg/dL  / Bili: Negative / Urobili: 0.2 E.U./dL   Blood: x / Protein: NEGATIVE mg/dL / Nitrite: NEGATIVE   Leuk Esterase: NEGATIVE / RBC: x / WBC x   Sq Epi: x / Non Sq Epi: x / Bacteria: x    RADIOLOGY & ADDITIONAL STUDIES:  < from: CT Chest No Cont (21 @ 17:18) >  FINDINGS:    ---CHEST---    Lungs/Pleura/Airways: Moderate sized loculated left pleural effusion with left lower lobe atelectasis. Suspect superimposed consolidation. Focal stenosis seen in the segmental left lower lobe bronchus as well, and overall findings raise suspicion for malignancy. Small right pleural effusion with associated marrow right lower lobe atelectasis. Small amount of layering mucus in the trachea.    Mediastinum: Heart is normal in size. Small to moderate pericardial effusion. 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 Dense calcifications of the thoracic aorta. Dense coronary artery calcified lesions and/or stents.    Lymph nodes: No lymphadenopathy, although evaluation of the bilateral yang is limited without intravenous contrast.    Chest wall/Lower neck: Unremarkable.    Bones: Degenerative changes of the spine. Osteopenia. Multilevel flowing thoracic syndesmophytes.      ---ABDOMEN/PELVIS---    Evaluation of the abdominopelvic viscera is limited due to noncontrast technique.    Liver: Smooth in contour. No definite focal lesion.    Biliary system: Gallbladder is normal in size. No calcified gallstones. No biliary ductal dilatation.    Pancreas: Unremarkable.    Spleen: Unremarkable.    Adrenal glands: Unremarkable.    Kidneys: No hydronephrosis. No renal or ureteral calculus. Moderate bilateral renal atrophy.    Bowel/Peritoneum: No evidence of bowel obstruction. Left lower quadrant anastomosis appears similar to 2019. Trace ascites. No free air.    Pelvic organs: Enlarged prostate. Prostate brachytherapy beads.    Lymph nodes: No lymphadenopathy.    Vascular: Extensive dense calcifications of the aorta and its branches. 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    Bones/Soft tissues: Ankylosis of the sacroiliac joints. Degenerative change of the left hip with large subchondral cystic change in left femoral head is similar to prior. Degenerative changes of the spine. Bulky anterior lumbar osteophytes.      IMPRESSION:  1.  Loculated mild to moderate left pleural effusion. Suspect left lower lobe consolidation superimposed on atelectasis. Focal stenosis of a left lower lobe segmental bronchus also seen. The differential includes rounded atelectasis, acute on chronic aspiration pneumonia and neoplasm.  2.  Small-moderate pericardial effusion. Small right pleural effusion.  < end of copied text >  
  Patient is a 89y old  Male who presents with a chief complaint of Shortness of breath (2021 12:34)       HPI:  89M PMH CLL, PUD, anemia, CAD, s/p 2 stents (), b/l DVT () and recent PE (after urolift procedure) on Eliquis, cervical radiculopathy, and colon polyps was referred to the ED by his heme/onc provider Dr. Oliveira after she noted new onset atrial fibrillation and shortness of breath. [facility] due [facility] periumbilical pain. Pt received a colonoscopy and abdominal work up which were unrevealing. Since then the patient has endorsed wet cough with white/yellow sputum, shortness of breath, decreased mobility, and  PO intake associated with his reported 4lb weight loss. Pt denies associated chest pain, fever, chills, headache, abdominal pain, nausea, emesis, changes to bowel movements, peripheral edema, rashes, recent travel, known sick contacts or any additional acute complaints or concerns at this time. He reports he lives alone with part-time home health aides and ambulates with a cane. He is compliant with all his medications.     ED Course:  Vitals: T 98.2, , BP 97/61, RR 22, SpO2 96% room air  Labs: wbc 6.5, hb 8.4, plts 165, BUN/Cr 43/1.92, trops peaked 0.04, BNP 2696  EKG: Irregular rhythm, rate controlled, HR 82, qtc 450  CXR: Cardiomegaly, thoracic aortic calcification. Bilateral opacities/pleural effusions. Thoracic spine and bilateral AC joint degenerative changes.  CT chest/abdomen: Loculated mild to moderate left pleural effusion. Suspect left lower lobe consolidation superimposed on atelectasis. Focal stenosis of a left lower lobe segmental bronchus also seen. The differential includes rounded atelectasis, acute on chronic aspiration pneumonia and neoplasm. Small-moderate pericardial effusion. Small right pleural effusion.  Interventions: Azithromycin 500mg IV, Ceftriaxone 1000mg IV, and 1.5L NS     (2021 20:54)      PAST MEDICAL & SURGICAL HISTORY:  HTN (hypertension)    Upper GI bleed    PUD (peptic ulcer disease)    CAD (coronary artery disease)    CLL (chronic lymphocytic leukemia)    H/O radiculopathy    BPH (benign prostatic hyperplasia)    History of DVT of lower extremity    Pulmonary embolism    S/P partial colectomy    History of prostate surgery        MEDICATIONS  (STANDING):  apixaban 2.5 milliGRAM(s) Oral every 12 hours  azithromycin  IVPB 500 milliGRAM(s) IV Intermittent every 24 hours  cefTRIAXone   IVPB 1000 milliGRAM(s) IV Intermittent every 24 hours  cyanocobalamin 1000 MICROGram(s) Oral daily  multivitamin 1 Tablet(s) Oral daily  pantoprazole    Tablet 40 milliGRAM(s) Oral before breakfast    MEDICATIONS  (PRN):        Social History:               -  Home Living Status:  lives alone in an elevator accessible apartment building         -  Prior Home Care Services:   3 days x 4 hrs/day         -  Occupation:    Baseline Functional Level Prior to Admission:             - ADL's/ IADL's:  independent         - ambulatory status PTA:  walked without devices at home, uses a rollator in the community    FAMILY HISTORY:  FH: CAD (coronary artery disease) (Father)        CBC Full  -  ( 2021 07:40 )  WBC Count : 5.02 K/uL  RBC Count : 2.40 M/uL  Hemoglobin : 7.6 g/dL  Hematocrit : 23.7 %  Platelet Count - Automated : 130 K/uL  Mean Cell Volume : 98.8 fl  Mean Cell Hemoglobin : 31.7 pg  Mean Cell Hemoglobin Concentration : 32.1 gm/dL  Auto Neutrophil # : 2.08 K/uL  Auto Lymphocyte # : 2.59 K/uL  Auto Monocyte # : 0.28 K/uL  Auto Eosinophil # : 0.04 K/uL  Auto Basophil # : 0.02 K/uL  Auto Neutrophil % : 41.4 %  Auto Lymphocyte % : 51.6 %  Auto Monocyte % : 5.6 %  Auto Eosinophil % : 0.8 %  Auto Basophil % : 0.4 %          138  |  110<H>  |  36<H>  ----------------------------<  89  4.4   |  15<L>  |  1.61<H>    Ca    8.6      2021 07:40  Phos  3.5     -  Mg     1.8         TPro  5.6<L>  /  Alb  3.0<L>  /  TBili  0.3  /  DBili  x   /  AST  13  /  ALT  9<L>  /  AlkPhos  57        Urinalysis Basic - ( 2021 08:52 )    Color: Yellow / Appearance: Clear / S.020 / pH: x  Gluc: x / Ketone: Trace mg/dL  / Bili: Negative / Urobili: 0.2 E.U./dL   Blood: x / Protein: NEGATIVE mg/dL / Nitrite: NEGATIVE   Leuk Esterase: NEGATIVE / RBC: x / WBC x   Sq Epi: x / Non Sq Epi: x / Bacteria: x          Radiology:    < from: Xray Chest 1 View-PORTABLE IMMEDIATE (Xray Chest 1 View-PORTABLE IMMEDIATE .) (21 @ 15:43) >  EXAM:  XR CHEST PORTABLE IMMED 1V                          PROCEDURE DATE:  2021          INTERPRETATION:  Clinical history/reason for exam: Dyspnea.    Frontal chest.    No comparison.    Findings/  impression: Cardiomegaly, thoracic aortic calcification. Bilateral opacities/pleural effusions. Thoracic spine and bilateral AC joint degenerative changes.          < from: CT Chest No Cont (21 @ 17:18) >    EXAM:  CT CHEST                          EXAM:  CT ABDOMEN AND PELVIS                          PROCEDURE DATE:  2021          INTERPRETATION:  CT of the CHEST, ABDOMEN, and PELVIS without intravenous contrast dated 2021 5:18 PM    CLINICAL STATEMENT: Abdominal pain, dyspnea.    TECHNIQUE: CT of the chest, abdomen, and pelvis without intravenous or oral contrast. Axial, sagittal, and coronal images were obtained and reviewed.    COMPARISON: CT of the abdomen and pelvis dated 2019    FINDINGS:    ---CHEST---    Lungs/Pleura/Airways: Moderate sized loculated left pleural effusion with left lower lobe atelectasis. Suspect superimposed consolidation. Focal stenosis seen in the segmental left lower lobe bronchus as well, and overall findings raise suspicion for malignancy. Small right pleural effusion with associated marrow right lower lobe atelectasis. Small amount of layering mucus in the trachea.    Mediastinum: Heart is normal in size. Small to moderate pericardial effusion. 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 Dense calcifications of the thoracic aorta. Dense coronary artery calcified lesions and/or stents.    Lymph nodes: No lymphadenopathy, although evaluation of the bilateral yang is limited without intravenous contrast.    Chest wall/Lower neck: Unremarkable.    Bones: Degenerative changes of the spine. Osteopenia. Multilevel flowing thoracic syndesmophytes.      ---ABDOMEN/PELVIS---    Evaluation of the abdominopelvic viscera is limited due to noncontrast technique.    Liver: Smooth in contour. No definite focal lesion.    Biliary system: Gallbladder is normal in size. No calcified gallstones. No biliary ductal dilatation.    Pancreas: Unremarkable.    Spleen: Unremarkable.    Adrenal glands: Unremarkable.    Kidneys: No hydronephrosis. No renal or ureteral calculus. Moderate bilateral renal atrophy.    Bowel/Peritoneum: No evidence of bowel obstruction. Left lower quadrant anastomosis appears similar to 2019. Trace ascites. No free air.    Pelvic organs: Enlarged prostate. Prostate brachytherapy beads.    Lymph nodes: No lymphadenopathy.    Vascular: Extensive dense calcifications of the aorta and its branches. 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    Bones/Soft tissues: Ankylosis of the sacroiliac joints. Degenerative change of the left hip with large subchondral cystic change in left femoral head is similar to prior. Degenerative changes of the spine. Bulky anterior lumbar osteophytes.      IMPRESSION:  1.  Loculated mild to moderate left pleural effusion. Suspect left lower lobe consolidation superimposed on atelectasis. Focal stenosis of a left lower lobe segmental bronchus also seen. The differential includes rounded atelectasis, acute on chronic aspiration pneumonia and neoplasm.  2.  Small-moderate pericardial effusion. Small right pleural effusion.              Vital Signs Last 24 Hrs  T(C): 36.7 (2021 12:36), Max: 36.8 (2021 14:48)  T(F): 98 (2021 12:36), Max: 98.2 (2021 14:48)  HR: 68 (2021 12:36) (65 - 110)  BP: 112/79 (2021 12:36) (95/51 - 112/79)  BP(mean): --  RR: 18 (2021 12:36) (17 - 22)  SpO2: 95% (2021 12:36) (95% - 100%)        REVIEW OF SYSTEMS:    CONSTITUTIONAL: No fever, weight loss, or fatigue  EYES: No eye pain, visual disturbances, or discharge  ENMT:  No difficulty hearing, tinnitus, vertigo; No sinus or throat pain  NECK: No pain or stiffness  BREASTS: No pain, masses, or nipple discharge  RESPIRATORY:  shortness of breath  CARDIOVASCULAR: No chest pain, palpitations, dizziness, or leg swelling  GASTROINTESTINAL: No abdominal or epigastric pain. No nausea, vomiting, or hematemesis; No diarrhea or constipation. No melena or hematochezia.  GENITOURINARY: No dysuria, frequency, hematuria, or incontinence  NEUROLOGICAL: No headaches, memory loss, loss of strength, numbness, or tremors  SKIN: No itching, burning, rashes, or lesions   LYMPH NODES: No enlarged glands  ENDOCRINE: No heat or cold intolerance; No hair loss  MUSCULOSKELETAL: No joint pain or swelling; No muscle, back, or extremity pain  PSYCHIATRIC: No depression, anxiety, mood swings, or difficulty sleeping  HEME/LYMPH: No easy bruising, or bleeding gums  ALLERGY AND IMMUNOLOGIC: No hives or eczema  VASCULAR: no swelling, erythema,   : no dysuria, hematuria, frequency        Physical Exam: cachectic 89 yo Irish gentleman lying in semi Rojas's position, no acute complaints     Head: normocephalic, atraumatic    Eyes: PERRLA, EOMI, no nystagmus, sclera anicteric    ENT: nasal discharge, uvula midline, no oropharyngeal erythema/exudate    Neck: supple, negative JVD, negative carotid bruits, no thyromegaly    Chest: crackles at left base    Cardiovascular: regular rate and rhythm, neg murmurs/rubs/gallops    Abdomen: soft, non distended, non tender to palpation in all 4 quadrants, negative rebound/guarding, normal bowel sounds    Extremities: WWP, neg cyanosis/clubbing/edema, negative calf tenderness to palpation, negative Minor's sign    Musculoskeletal:    Skin:      :     Neurologic Exam:    Alert and oriented to person, place, date/year, speech fluent w/o dysarthria, follows commands, recent and remote memory intact, repetition intact, comprehension intact,  attention/concentration intact, fund of knowledge appropriate    Cranial Nerves:     II:                         pupils equal, round and reactive to light, visual fields intact   III/ IV/VI:             extraocular movements intact, neg nystagmus, neg ptosis  V:                        facial sensation intact, V1-3 normal  VII:                      face symmetric, no droop, normal eye closure and smile  VIII:                     hearing intact to finger rub bilaterally  IX and X:             no hoarseness, gag intact, palate/ uvula rise symmetrically  XI:                       SCM/ trapezius strength intact bilateral  XII:                      no tongue deviation    Motor Exam:    Right UE:             no focal weakness > 3+/5                             pronator drift: neg    Left UE:                no focal weakness > 3+/5                             pronator drift: neg      Right LE:             no focal weakness > 3+/5    Left LE:                no focal weakness > 3+/5               Sensation:           Intact to light touch x 4 extremities                                                 DTR:                  biceps/brachioradialis: equal bilaterally                                                    patella/ankle: equal bilaterally                                                     neg clonus                           neg Babinski                          Gait:  not tested        PM&R Impression:    1) deconditioned  2) no focal weakness    Plan:    1) Physical therapy focusing on therapeutic exercises, bed mobility/transfer out of bed evaluation, progressive ambulation with assistive devices prn.    2) Anticipated Disposition Plan/Recs:    d/c home with home physical therapy, resume home care services                  
HPI:  89M PMH CLL, PUD, anemia, CAD, s/p 2 stents (2010), b/l DVT (2019) and recent PE (after urolift procedure) on Eliquis, cervical radiculopathy, and colon polyps was referred to the ED by his heme/onc provider Dr. Oliveira after she noted new onset atrial fibrillation and shortness of breath. [facility] due to periumbilical pain. Pt received a colonoscopy and abdominal work up which were unrevealing. Since then the patient has endorsed wet cough with white/yellow sputum, shortness of breath, decreased mobility, and  PO intake associated with his reported 4lb weight loss. Pt denies associated chest pain, fever, chills, headache, abdominal pain, nausea, emesis, changes to bowel movements, peripheral edema, rashes, recent travel, known sick contacts or any additional acute complaints or concerns at this time. He reports he lives alone with part-time home health aides and ambulates with a cane. He is compliant with all his medications.     ED Course:  Vitals: T 98.2, , BP 97/61, RR 22, SpO2 96% room air  Labs: wbc 6.5, hb 8.4, plts 165, BUN/Cr 43/1.92, trops peaked 0.04, BNP 2696  EKG: Irregular rhythm, rate controlled, HR 82, qtc 450  CXR: Cardiomegaly, thoracic aortic calcification. Bilateral opacities/pleural effusions. Thoracic spine and bilateral AC joint degenerative changes.  CT chest/abdomen: Loculated mild to moderate left pleural effusion. Suspect left lower lobe consolidation superimposed on atelectasis. Focal stenosis of a left lower lobe segmental bronchus also seen. The differential includes rounded atelectasis, acute on chronic aspiration pneumonia and neoplasm. Small-moderate pericardial effusion. Small right pleural effusion.  Interventions: Azithromycin 500mg IV, Ceftriaxone 1000mg IV, and 1.5L NS     (2021 20:54)      PAST MEDICAL & SURGICAL HISTORY:  HTN (hypertension)    Upper GI bleed    PUD (peptic ulcer disease)    CAD (coronary artery disease)    CLL (chronic lymphocytic leukemia)    H/O radiculopathy    BPH (benign prostatic hyperplasia)    History of DVT of lower extremity    Pulmonary embolism    S/P partial colectomy    History of prostate surgery         Review of Systems:  · General	negative  · Skin/Breast	negative  · Ophthalmologic	negative  · ENMT	negative  · Respiratory and Thorax	negative  · Cardiovascular	negative  · Gastrointestinal	negative  · Genitourinary	negative  · Musculoskeletal Comments	negative  · Neurological	negative      MEDICATIONS  (STANDING):  azithromycin  IVPB 500 milliGRAM(s) IV Intermittent every 24 hours  cefTRIAXone   IVPB 1000 milliGRAM(s) IV Intermittent every 24 hours  cyanocobalamin 1000 MICROGram(s) Oral daily  enoxaparin Injectable 50 milliGRAM(s) SubCutaneous every 24 hours  multivitamin 1 Tablet(s) Oral daily  pantoprazole    Tablet 40 milliGRAM(s) Oral before breakfast    MEDICATIONS  (PRN):      Allergies    No Known Allergies    Intolerances        SOCIAL HISTORY:    FAMILY HISTORY:  FH: CAD (coronary artery disease) (Father)        Vital Signs Last 24 Hrs  T(C): 36.7 (2021 20:40), Max: 36.7 (2021 12:36)  T(F): 98.1 (2021 20:40), Max: 98.1 (2021 20:40)  HR: 75 (2021 20:40) (68 - 75)  BP: 113/65 (2021 20:40) (100/56 - 113/65)  BP(mean): --  RR: 18 (2021 20:40) (17 - 18)  SpO2: 93% (2021 20:40) (93% - 96%)     Physical Exam:  · Constitutional	detailed exam  · Constitutional Details	well-developed; well-groomed  · Eyes	EOMI; PERRL; no drainage or redness  · ENMT Comments	dry mucous membranes  · Respiratory	detailed exam  · Respiratory Comments	normal breath sounds at the lung bases bilaterally  · Cardiovascular	Regular rate & rhythm, normal S1, S2; no murmurs, gallops or rubs; no S3, S4  · Abd-Soft non tender  ·Ext-no edema, clubbing or cyanosis      LABS:                        7.6    5.02  )-----------( 130      ( 2021 07:40 )             23.7     04-14    138  |  110<H>  |  36<H>  ----------------------------<  89  4.4   |  15<L>  |  1.61<H>    Ca    8.6      2021 07:40  Phos  3.5     04-14  Mg     1.8     04-14    TPro  5.6<L>  /  Alb  3.0<L>  /  TBili  0.3  /  DBili  x   /  AST  13  /  ALT  9<L>  /  AlkPhos  57  04-14    PT/INR - ( 2021 15:17 )   PT: 21.7 sec;   INR: 1.86          PTT - ( 2021 15:17 )  PTT:26.6 sec  Urinalysis Basic - ( 2021 08:52 )    Color: Yellow / Appearance: Clear / S.020 / pH: x  Gluc: x / Ketone: Trace mg/dL  / Bili: Negative / Urobili: 0.2 E.U./dL   Blood: x / Protein: NEGATIVE mg/dL / Nitrite: NEGATIVE   Leuk Esterase: NEGATIVE / RBC: x / WBC x   Sq Epi: x / Non Sq Epi: x / Bacteria: x        RADIOLOGY & ADDITIONAL STUDIES:

## 2021-04-15 NOTE — DISCHARGE NOTE PROVIDER - HOSPITAL COURSE
#Discharge: do not delete    Pt is a 89 yo male with a history of CLL, PUD, gout, anemia, CAD, s/p 2 stents (2010), b/l DVT, and PE (on Eliquis), cervical radiculopathy, and colon polyps presenting from Dr. Oliveira with new shortness of breath, loss of appetite, and weight loss for the past week. CT chest showing LLL consolidation, admitted for treatment of pneumonia and pleural effusions.    Problem List/Inpatient treatment course:    1. Loculated pleural effusion- Presenting with symptoms of shortness of breath for the past week. Endorses difficulty breathing while ambulating and cough with yellow phlegm. Denies fevers and chills. Afebrile on admission with tachycardia to 110, hypotensive to 97/61. S/p 1.5L NS and ceftriaxone and azithromycin.  - CXR with cardiomegaly, thoracic aortic calcification and bilateral opacities/pleural effusions  - CT chest showing loculated mild to moderate left pleural effusion. Suspect left lower lobe consolidation superimposed on atelectasis. Focal stenosis of a left lower lobe segmental bronchus also seen. Differentials included atelectasis, pneumonia and neoplasm. and Moderate pericardial effusion.   - low concern for PE given patient on Eliquis, not hypoxic, not tachycardic, and hypotension resolving with fluids.  - continue CAP treatment; Azithromycin (4/14-4/16), Ceftriaxone (4/14-4/18)  - pulm following- Plan for thoracentesis tomorrow.     2. Pulmonary embolism- Patient diagnosed with PE s/p urolift procedure on 3/22 for BPH with Dr. Crawford. Started on Eliquis (from Jun-Nov 2019 after first PE and then again on 4/7/21 [facility] Follows with Dr. Oliveira. Currently breathing well on room air, not tachycardic or hypotensive.  - continue Eliquis 2.5mg BID  - f/u d-dimer  - follow up with Dr. Oliveira  - Pulm recs as above.     3. DVT (deep venous thrombosis)- History of DVT in bilateral lower extremities in 2019.   - continue on Eliquis 2.5mg BID  - f/u with Dr. Oliveira  - f/u LE doppler.     4. Acute kidney injury superimposed on CKD- History of CKD stage 3, Cr on admission 1.92 (baseline 1.3), likely in the setting of hypovolemia. Appropriate UOP. S/p 1.5L NS  - Improving today  - encourage PO intake  - continue to monitor s/p hydration and increased PO intake  - avoid nephrotoxic medications.     5. HTN (hypertension).  Plan: history of hypertension  - holding home Amlodipine 5mg daily, metoprolol ER 25mg daily in the setting of hypotension, restart at tolerated.     6. Anemia- History of anemia, follows with Dr. Oliveira, received multiple transfusions in the past including IV iron, last being in 2019. Hb on admission 8.4 (~11 last month) with no signs and symptoms of bleeding.  - Trend H/H and keep active T&S, transfuse if hb<7  - Dr. Oliveira following- s/p 1u PRBC yesterday with increase in Hgb from 7.6 to 10.4.     7.Trouble swallowing- Patient with trouble swallowing  - Speech & swallow following  - F/u FEES.     New medications: N/A     Labs to be followed outpatient: N/A    Exam to be followed outpatient: #Discharge: do not delete    Pt is a 89 yo male with a history of CLL, PUD, gout, anemia, CAD, s/p 2 stents (2010), b/l DVT, and PE (on Eliquis), cervical radiculopathy, and colon polyps presenting from Dr. Oliveira with new shortness of breath, loss of appetite, and weight loss for the past week. CT chest showing LLL consolidation, admitted for treatment of pneumonia and pleural effusions.    Problem List/Inpatient treatment course:    1. Loculated pleural effusion- Presenting with symptoms of shortness of breath for the past week. Endorses difficulty breathing while ambulating and cough with yellow phlegm. Denies fevers and chills. Afebrile on admission with tachycardia to 110, hypotensive to 97/61. S/p 1.5L NS and ceftriaxone and azithromycin.  - CXR with cardiomegaly, thoracic aortic calcification and bilateral opacities/pleural effusions  - CT chest showing loculated mild to moderate left pleural effusion. Suspect left lower lobe consolidation superimposed on atelectasis. Focal stenosis of a left lower lobe segmental bronchus also seen. Differentials included atelectasis, pneumonia and neoplasm. and Moderate pericardial effusion.   - low concern for PE given patient on Eliquis, not hypoxic, not tachycardic, and hypotension resolving with fluids.  - continue CAP treatment; Azithromycin (4/14-4/16), Ceftriaxone (4/14-4/18)  - Thoracentesis done- will f/u outpatient     2. Pulmonary embolism- Patient diagnosed with PE s/p urolift procedure on 3/22 for BPH with Dr. Crawford. Started on Eliquis (from Jun-Nov 2019 after first PE and then again on 4/7/21 [facility] Follows with Dr. Oliveira. Currently breathing well on room air, not tachycardic or hypotensive.  - continue Eliquis 2.5mg BID oupatient  - follow up with Dr. Oliveira    3. DVT (deep venous thrombosis)- History of DVT in bilateral lower extremities in 2019.   - continue on Eliquis 2.5mg BID  - f/u with Dr. Olvieira  - f/u LE doppler- no acute DVT    4. Acute kidney injury superimposed on CKD- History of CKD stage 3, Cr on admission 1.92 (baseline 1.3), likely in the setting of hypovolemia. Appropriate UOP. S/p 1.5L NS  - Improving today  - encourage PO intake  - continue to monitor s/p hydration and increased PO intake  - avoid nephrotoxic medications.     5. HTN (hypertension).  Plan: history of hypertension  - holding home Amlodipine 5mg daily, metoprolol ER 25mg daily in the setting of hypotension, restart at tolerated.     6. Anemia- History of anemia, follows with Dr. Oliveira, received multiple transfusions in the past including IV iron, last being in 2019. Hb on admission 8.4 (~11 last month) with no signs and symptoms of bleeding.  - Trend H/H and keep active T&S, transfuse if hb<7  - Dr. Oliveira following- s/p 1u PRBC yesterday with increase in Hgb from 7.6 to 10.4.   - f/u with GI    7.Trouble swallowing- Patient with trouble swallowing  - Speech & swallow following  - FEES- normal    New medications: N/A     Labs to be followed outpatient: Results for thoracentesis    Exam to be followed outpatient: N/A

## 2021-04-16 ENCOUNTER — RESULT REVIEW (OUTPATIENT)
Age: 86
End: 2021-04-16

## 2021-04-16 PROBLEM — N40.0 BENIGN PROSTATIC HYPERPLASIA WITHOUT LOWER URINARY TRACT SYMPTOMS: Chronic | Status: ACTIVE | Noted: 2021-04-13

## 2021-04-16 PROBLEM — I26.99 OTHER PULMONARY EMBOLISM WITHOUT ACUTE COR PULMONALE: Chronic | Status: ACTIVE | Noted: 2021-04-13

## 2021-04-16 PROBLEM — Z86.718 PERSONAL HISTORY OF OTHER VENOUS THROMBOSIS AND EMBOLISM: Chronic | Status: ACTIVE | Noted: 2021-04-13

## 2021-04-16 LAB
ALBUMIN FLD-MCNC: 2.1 G/DL — SIGNIFICANT CHANGE UP
ALBUMIN SERPL ELPH-MCNC: 3.3 G/DL — SIGNIFICANT CHANGE UP (ref 3.3–5)
ALP SERPL-CCNC: 68 U/L — SIGNIFICANT CHANGE UP (ref 40–120)
ALT FLD-CCNC: 12 U/L — SIGNIFICANT CHANGE UP (ref 10–45)
ANION GAP SERPL CALC-SCNC: 11 MMOL/L — SIGNIFICANT CHANGE UP (ref 5–17)
APTT BLD: 32.2 SEC — SIGNIFICANT CHANGE UP (ref 27.5–35.5)
AST SERPL-CCNC: 19 U/L — SIGNIFICANT CHANGE UP (ref 10–40)
BASOPHILS # BLD AUTO: 0.03 K/UL — SIGNIFICANT CHANGE UP (ref 0–0.2)
BASOPHILS NFR BLD AUTO: 0.4 % — SIGNIFICANT CHANGE UP (ref 0–2)
BILIRUB SERPL-MCNC: 0.4 MG/DL — SIGNIFICANT CHANGE UP (ref 0.2–1.2)
BUN SERPL-MCNC: 23 MG/DL — SIGNIFICANT CHANGE UP (ref 7–23)
CALCIUM SERPL-MCNC: 8.8 MG/DL — SIGNIFICANT CHANGE UP (ref 8.4–10.5)
CHLORIDE SERPL-SCNC: 112 MMOL/L — HIGH (ref 96–108)
CO2 SERPL-SCNC: 21 MMOL/L — LOW (ref 22–31)
CREAT FLD-MCNC: 1.15 MG/DL — SIGNIFICANT CHANGE UP
CREAT SERPL-MCNC: 1.2 MG/DL — SIGNIFICANT CHANGE UP (ref 0.5–1.3)
EOSINOPHIL # BLD AUTO: 0.1 K/UL — SIGNIFICANT CHANGE UP (ref 0–0.5)
EOSINOPHIL NFR BLD AUTO: 1.4 % — SIGNIFICANT CHANGE UP (ref 0–6)
GLUCOSE FLD-MCNC: 124 MG/DL — SIGNIFICANT CHANGE UP
GLUCOSE SERPL-MCNC: 108 MG/DL — HIGH (ref 70–99)
GRAM STN FLD: SIGNIFICANT CHANGE UP
HCT VFR BLD CALC: 32.1 % — LOW (ref 39–50)
HGB BLD-MCNC: 10.4 G/DL — LOW (ref 13–17)
IMM GRANULOCYTES NFR BLD AUTO: 0.3 % — SIGNIFICANT CHANGE UP (ref 0–1.5)
INR BLD: 1.26 — HIGH (ref 0.88–1.16)
LDH SERPL L TO P-CCNC: 171 U/L — SIGNIFICANT CHANGE UP
LYMPHOCYTES # BLD AUTO: 4.43 K/UL — HIGH (ref 1–3.3)
LYMPHOCYTES # BLD AUTO: 60.4 % — HIGH (ref 13–44)
MAGNESIUM SERPL-MCNC: 2 MG/DL — SIGNIFICANT CHANGE UP (ref 1.6–2.6)
MCHC RBC-ENTMCNC: 31 PG — SIGNIFICANT CHANGE UP (ref 27–34)
MCHC RBC-ENTMCNC: 32.4 GM/DL — SIGNIFICANT CHANGE UP (ref 32–36)
MCV RBC AUTO: 95.8 FL — SIGNIFICANT CHANGE UP (ref 80–100)
MONOCYTES # BLD AUTO: 0.4 K/UL — SIGNIFICANT CHANGE UP (ref 0–0.9)
MONOCYTES NFR BLD AUTO: 5.4 % — SIGNIFICANT CHANGE UP (ref 2–14)
NEUTROPHILS # BLD AUTO: 2.36 K/UL — SIGNIFICANT CHANGE UP (ref 1.8–7.4)
NEUTROPHILS NFR BLD AUTO: 32.1 % — LOW (ref 43–77)
NRBC # BLD: 0 /100 WBCS — SIGNIFICANT CHANGE UP (ref 0–0)
PH, PLEURAL FLUID: 7.48 — SIGNIFICANT CHANGE UP
PHOSPHATE SERPL-MCNC: 3.1 MG/DL — SIGNIFICANT CHANGE UP (ref 2.5–4.5)
PLATELET # BLD AUTO: 201 K/UL — SIGNIFICANT CHANGE UP (ref 150–400)
POTASSIUM SERPL-MCNC: 4.1 MMOL/L — SIGNIFICANT CHANGE UP (ref 3.5–5.3)
POTASSIUM SERPL-SCNC: 4.1 MMOL/L — SIGNIFICANT CHANGE UP (ref 3.5–5.3)
PROT FLD-MCNC: 3.4 G/DL — SIGNIFICANT CHANGE UP
PROT SERPL-MCNC: 6.4 G/DL — SIGNIFICANT CHANGE UP (ref 6–8.3)
PROTHROM AB SERPL-ACNC: 14.9 SEC — HIGH (ref 10.6–13.6)
RBC # BLD: 3.35 M/UL — LOW (ref 4.2–5.8)
RBC # FLD: 14.1 % — SIGNIFICANT CHANGE UP (ref 10.3–14.5)
SODIUM SERPL-SCNC: 144 MMOL/L — SIGNIFICANT CHANGE UP (ref 135–145)
SPECIMEN SOURCE FLD: SIGNIFICANT CHANGE UP
SPECIMEN SOURCE: SIGNIFICANT CHANGE UP
WBC # BLD: 7.34 K/UL — SIGNIFICANT CHANGE UP (ref 3.8–10.5)
WBC # FLD AUTO: 7.34 K/UL — SIGNIFICANT CHANGE UP (ref 3.8–10.5)

## 2021-04-16 PROCEDURE — 99233 SBSQ HOSP IP/OBS HIGH 50: CPT | Mod: GC,25

## 2021-04-16 PROCEDURE — 99233 SBSQ HOSP IP/OBS HIGH 50: CPT | Mod: GC

## 2021-04-16 PROCEDURE — 88112 CYTOPATH CELL ENHANCE TECH: CPT | Mod: 26

## 2021-04-16 PROCEDURE — 32555 ASPIRATE PLEURA W/ IMAGING: CPT

## 2021-04-16 PROCEDURE — 71045 X-RAY EXAM CHEST 1 VIEW: CPT | Mod: 26

## 2021-04-16 PROCEDURE — 88305 TISSUE EXAM BY PATHOLOGIST: CPT | Mod: 26

## 2021-04-16 RX ORDER — ACETAMINOPHEN 500 MG
650 TABLET ORAL ONCE
Refills: 0 | Status: COMPLETED | OUTPATIENT
Start: 2021-04-16 | End: 2021-04-16

## 2021-04-16 RX ORDER — METOPROLOL TARTRATE 50 MG
25 TABLET ORAL DAILY
Refills: 0 | Status: DISCONTINUED | OUTPATIENT
Start: 2021-04-16 | End: 2021-04-17

## 2021-04-16 RX ORDER — ENOXAPARIN SODIUM 100 MG/ML
50 INJECTION SUBCUTANEOUS ONCE
Refills: 0 | Status: COMPLETED | OUTPATIENT
Start: 2021-04-16 | End: 2021-04-16

## 2021-04-16 RX ORDER — APIXABAN 2.5 MG/1
2.5 TABLET, FILM COATED ORAL
Refills: 0 | Status: DISCONTINUED | OUTPATIENT
Start: 2021-04-17 | End: 2021-04-17

## 2021-04-16 RX ORDER — CEFPODOXIME PROXETIL 100 MG
1 TABLET ORAL
Qty: 1 | Refills: 0
Start: 2021-04-16 | End: 2021-04-16

## 2021-04-16 RX ADMIN — PREGABALIN 1000 MICROGRAM(S): 225 CAPSULE ORAL at 11:39

## 2021-04-16 RX ADMIN — Medication 1 TABLET(S): at 11:39

## 2021-04-16 RX ADMIN — IRON SUCROSE 110 MILLIGRAM(S): 20 INJECTION, SOLUTION INTRAVENOUS at 21:38

## 2021-04-16 RX ADMIN — Medication 650 MILLIGRAM(S): at 17:24

## 2021-04-16 RX ADMIN — PANTOPRAZOLE SODIUM 40 MILLIGRAM(S): 20 TABLET, DELAYED RELEASE ORAL at 05:37

## 2021-04-16 RX ADMIN — ENOXAPARIN SODIUM 50 MILLIGRAM(S): 100 INJECTION SUBCUTANEOUS at 21:38

## 2021-04-16 RX ADMIN — Medication 650 MILLIGRAM(S): at 16:58

## 2021-04-16 RX ADMIN — AZITHROMYCIN 255 MILLIGRAM(S): 500 TABLET, FILM COATED ORAL at 18:31

## 2021-04-16 RX ADMIN — CEFTRIAXONE 100 MILLIGRAM(S): 500 INJECTION, POWDER, FOR SOLUTION INTRAMUSCULAR; INTRAVENOUS at 18:32

## 2021-04-16 NOTE — PROGRESS NOTE ADULT - PROBLEM SELECTOR PLAN 4
History of CKD stage 3, Cr on admission 1.92 (baseline 1.3), likely in the setting of hypovolemia. Appropriate UOP. S/p 1.5L NS  - encourage PO intake  - continue to monitor s/p hydration and increased PO intake  - avoid nephrotoxic medications
inactive,,,
History of CKD stage 3, Cr on admission 1.92 (baseline 1.3), likely in the setting of hypovolemia. Appropriate UOP. S/p 1.5L NS  - Improving  - encourage PO intake  - continue to monitor s/p hydration and increased PO intake  - avoid nephrotoxic medications
History of CKD stage 3, Cr on admission 1.92 (baseline 1.3), likely in the setting of hypovolemia. Appropriate UOP. S/p 1.5L NS  - Improving today  - encourage PO intake  - continue to monitor s/p hydration and increased PO intake  - avoid nephrotoxic medications

## 2021-04-16 NOTE — SWALLOW FEES ASSESSMENT ADULT - DIAGNOSTIC IMPRESSIONS
Pt p/w functional oropharyngeal swallow. Airway protection was functional. Although mild vallecular residue was observed, the amount of residue was so mild that it did not appear to pose any risk of spillover into the larynx. Residue was cleared w a cued liquid wash. However, even after it cleared, pt continued to complain of sensing that the PO was stuck at the level of the throat. Given that no residue was noted in the pharynx at this time, his complaint may be a referred sensation from esophageal stage of the swallow.

## 2021-04-16 NOTE — SWALLOW FEES ASSESSMENT ADULT - PHARYNGEAL PHASE COMMENTS
Timely swallow initiation. No penetration/aspiration. Mildly decreased BOT resulting in mild vallecular residue after regular solid. Residue was cleared w a cued liquid wash. No backflow noted above UES.

## 2021-04-16 NOTE — PROGRESS NOTE ADULT - PROBLEM SELECTOR PLAN 7
History of anemia, follows with Dr. Oliveira, received multiple transfusions in the past including IV iron, last being in 2019. Hb on admission 8.4 (~11 last month) with no signs and symptoms of bleeding.  - Trend H/H and keep active T&S, transfuse if hb<7  - Dr. Oliveira following- s/p 1u PRBC yesterday with increase in Hgb from 7.6 to 10.4
History of anemia, follows with Dr. Oliveira, received multiple transfusions in the past including IV iron, last being in 2019. Hb on admission 8.4 (~11 last month) with no signs and symptoms of bleeding.  - Trend H/H and keep active T&S, transfuse if hb<7  - Dr. Oliveira following- s/p 1u PRBC yesterday with increase in Hgb from 7.6 to 10.4
History of anemia, follows with Dr. Oliveira, received multiple transfusions in the past including IV iron, last being in 2019. Hb on admission 8.4 (~11 last month) with no signs and symptoms of bleeding.  - f/u iron panel  - Trend H/H and keep active T&S, transfuse if hb<7  - f/u with Dr. Oliveira in the AM

## 2021-04-16 NOTE — PROGRESS NOTE ADULT - PROBLEM SELECTOR PLAN 3
History of DVT in bilateral lower extremities in 2019. Per doppler- no new DVT or changes from previous DVT in 2019.  - c/w Lovenox therapeutic dosing inpt.  - f/u with Dr. Oliveira

## 2021-04-16 NOTE — PROGRESS NOTE ADULT - PROBLEM SELECTOR PROBLEM 4
Acute kidney injury superimposed on CKD
CLL (chronic lymphocytic leukemia)
Acute kidney injury superimposed on CKD
Acute kidney injury superimposed on CKD

## 2021-04-16 NOTE — SWALLOW FEES ASSESSMENT ADULT - COMMENTS
Based on clinical presentation, a FEES was recommended and an order was provided by the medical team. The risks, benefits and alternatives of this study were discussed with the pt. He expressed understanding and agreed to proceed. The pt tolerated the passing and presence of the scope without difficulty. This study was completed in conjunction w SLP, Fanta Isbell. B/L TVF movement noted

## 2021-04-16 NOTE — PROGRESS NOTE ADULT - PROBLEM SELECTOR PLAN 5
History of hypertension  - holding home Amlodipine 5mg daily, metoprolol ER 25mg daily in the setting of hypotension, restart at tolerated
for thoracocentesis tomorrow...
history of hypertension  - holding home Amlodipine 5mg daily, metoprolol ER 25mg daily in the setting of hypotension, restart at tolerated
history of hypertension  - holding home Amlodipine 5mg daily, metoprolol ER 25mg daily in the setting of hypotension, restart at tolerated

## 2021-04-16 NOTE — SWALLOW FEES ASSESSMENT ADULT - SLP GENERAL OBSERVATIONS
Pt was awake, feeding himself from breakfast tray, coughing and stating that he felt PO stuck in his throat.

## 2021-04-16 NOTE — SWALLOW FEES ASSESSMENT ADULT - RECOMMENDED FEEDING/EATING TECHNIQUES
alternate food with liquid/maintain upright posture during/after eating for 30 mins/small sips/bites

## 2021-04-16 NOTE — PROGRESS NOTE ADULT - NUTRITIONAL ASSESSMENT
This patient has been assessed with a concern for Malnutrition and has been determined to have a diagnosis/diagnoses of Moderate protein-calorie malnutrition and Underweight (BMI < 19).    This patient is being managed with:   Diet Regular-  Supplement Feeding Modality:  Oral  Ensure Enlive Cans or Servings Per Day:  1       Frequency:  Three Times a day  Entered: Apr 14 2021 12:30PM    

## 2021-04-16 NOTE — PROCEDURAL SAFETY CHECKLIST WITH OR WITHOUT SEDATION - NSPRESURGSED_GEN_ALL_CORE
Present, accurate, and signed Vital Signs Last 24 Hrs  T(C): 37 (17 Jul 2019 10:37), Max: 39.4 (16 Jul 2019 18:31)  T(F): 98.6 (17 Jul 2019 10:37), Max: 102.9 (16 Jul 2019 18:31)  HR: 93 (17 Jul 2019 10:37) (83 - 133)  BP: 96/61 (17 Jul 2019 10:37) (92/59 - 115/58)  BP(mean): 66 (16 Jul 2019 21:46) (66 - 66)  RR: 20 (17 Jul 2019 10:37) (20 - 24)  SpO2: 99% (17 Jul 2019 10:37) (97% - 100%)    GEN: awake, alert, NAD  HEENT: NCAT, EOMI, PEERL, TM clear bilaterally, no lymphadenopathy, normal oropharynx  CVS: Normal S1, S2. RRR, No M/R/G  RESPI: CTAB/L, no W/R/R  ABD: soft, NTND, no HSM +BS  EXT: Full ROM, no c/c/e, pulses 2+ bilaterally  NEURO: affect appropriate, 5/5 strength throughout  SKIN: no rash or nodules visible Vital Signs Last 24 Hrs  T(C): 37 (17 Jul 2019 10:37), Max: 39.4 (16 Jul 2019 18:31)  T(F): 98.6 (17 Jul 2019 10:37), Max: 102.9 (16 Jul 2019 18:31)  HR: 93 (17 Jul 2019 10:37) (83 - 133)  BP: 96/61 (17 Jul 2019 10:37) (92/59 - 115/58)  BP(mean): 66 (16 Jul 2019 21:46) (66 - 66)  RR: 20 (17 Jul 2019 10:37) (20 - 24)  SpO2: 99% (17 Jul 2019 10:37) (97% - 100%)    GEN: awake, alert, NAD  HEENT: NCAT, EOMI, PEERL, no lymphadenopathy  CVS: Normal S1, S2. RRR, No M/R/G  RESPI: CTAB/L, no W/R/R  ABD: soft, ND, no HSM +BS, tender to the bilateral flanks  EXT: pulses 2+ bilaterally  NEURO: affect appropriate  SKIN: no rash or nodules visible Vital Signs Last 24 Hrs  T(C): 37 (17 Jul 2019 10:37), Max: 39.4 (16 Jul 2019 18:31)  T(F): 98.6 (17 Jul 2019 10:37), Max: 102.9 (16 Jul 2019 18:31)  HR: 93 (17 Jul 2019 10:37) (83 - 133)  BP: 96/61 (17 Jul 2019 10:37) (92/59 - 115/58)  BP(mean): 66 (16 Jul 2019 21:46) (66 - 66)  RR: 20 (17 Jul 2019 10:37) (20 - 24)  SpO2: 99% (17 Jul 2019 10:37) (97% - 100%)    GEN: awake, alert, NAD  HEENT: NCAT, EOMI, PEERL, no lymphadenopathy  CVS: Normal S1, S2. RRR, No M/R/G  RESPI: CTAB/L, no W/R/R  ABD: soft, ND, no HSM +BS, tender to the bilateral flanks  EXT: pulses 2+ bilaterally  : testes descended bilaterally, uncircumcised penis  NEURO: affect appropriate  SKIN: no rash or nodules visible

## 2021-04-16 NOTE — SWALLOW FEES ASSESSMENT ADULT - ADDITIONAL RECOMMENDATIONS
1) Consider esophagram to evaluate esophageal stage of swallow given pt complaints  2) Given functional swallow, no further f/u is warranted at this time. This service is s/o. Reconsult PRN.

## 2021-04-16 NOTE — PROGRESS NOTE ADULT - PROBLEM SELECTOR PROBLEM 3
History of DVT of lower extremity
DVT (deep venous thrombosis)

## 2021-04-16 NOTE — PROGRESS NOTE ADULT - PROBLEM SELECTOR PLAN 9
History of gastric ulcers. Denies any melena or hematochezia  - continue home omeprazole 40mg daily (pantoprazole)    #Trouble swallowing- Patient with trouble swallowing  - Speech & swallow following  - F/u FEES
History of gastric ulcers. Denies any melena or hematochezia  - continue home omeprazole 40mg daily (pantoprazole)    #Trouble swallowing- Patient with trouble swallowing  - Speech & swallow following  - F/u FEES
History of gastric ulcers. Denies any melena or hematochezia  - continue home omeprazole 40mg daily (pantoprazole)

## 2021-04-16 NOTE — PROGRESS NOTE ADULT - PROBLEM SELECTOR PLAN 10
F: s/p 1.5L NS  E: Replete K<4, Mg<2  N: DASH/TLC  DVT ppx: Eliquis
F: s/p 1.5L NS  E: Replete K<4, Mg<2  N: DASH/TLC  DVT ppx: Eliquis
F: s/p 1.5L NS  E: Replete K<4, Mg<2  N: DASH/TLC  DVT ppx: Elquis

## 2021-04-16 NOTE — PROGRESS NOTE ADULT - PROBLEM SELECTOR PLAN 2
Patient diagnosed with PE s/p urolift procedure on 3/22 for BPH with Dr. Crawford. Started on Eliquis (from Jun-Nov 2019 after first PE and then again on 4/7/21 after [facility] Follows with Dr. Oliveira. Currently breathing well on room air, not tachycardic or hypotensive. Patient was transitioned from lovenox to Eliquis on d/c from [facility].   - C/w Lovenox 50 qd for therapeutic dosing- hold for thora today  -Rest of plan as above

## 2021-04-16 NOTE — PROGRESS NOTE ADULT - PROBLEM SELECTOR PROBLEM 1
Anemia due to acute blood loss
Loculated pleural effusion

## 2021-04-16 NOTE — PROGRESS NOTE ADULT - PROBLEM SELECTOR PLAN 1
Presenting with symptoms of shortness of breath for the past week. Endorses difficulty breathing while ambulating and cough with yellow phlegm. Denies fevers and chills. Afebrile on admission with tachycardia to 110, hypotensive to 97/61. S/p 1.5L NS and ceftriaxone and azithromycin.  - CXR with cardiomegaly, thoracic aortic calcification and bilateral opacities/pleural effusions  - CT chest showing loculated mild to moderate left pleural effusion. Suspect left lower lobe consolidation superimposed on atelectasis. Focal stenosis of a left lower lobe segmental bronchus also seen. Differentials included atelectasis, pneumonia and neoplasm.  - low concern for PE given patient on Eliquis, not hypoxic, not tachycardic, and hypotension resolving with fluids.  - continue CAP treatment  - pulm consulted- will f/u recs
Presenting with symptoms of shortness of breath for the past week. Endorses difficulty breathing while ambulating and cough with yellow phlegm. Denies fevers and chills. Afebrile on admission with tachycardia to 110, hypotensive to 97/61. S/p 1.5L NS and ceftriaxone and azithromycin.  - CXR with cardiomegaly, thoracic aortic calcification and bilateral opacities/pleural effusions  - CT chest showing loculated mild to moderate left pleural effusion. Suspect left lower lobe consolidation superimposed on atelectasis. Focal stenosis of a left lower lobe segmental bronchus also seen. Differentials included atelectasis, pneumonia and neoplasm. and Moderate pericardial effusion.   - continue CAP treatment; Azithromycin (4/14-4/16), Ceftriaxone (4/14-4/18)  - pulm following- Plan for thoracentesis today
great response to transfusion of 1U of PRBC...GI input appreciated...    Will start pt on IV Iron
Presenting with symptoms of shortness of breath for the past week. Endorses difficulty breathing while ambulating and cough with yellow phlegm. Denies fevers and chills. Afebrile on admission with tachycardia to 110, hypotensive to 97/61. S/p 1.5L NS and ceftriaxone and azithromycin.  - CXR with cardiomegaly, thoracic aortic calcification and bilateral opacities/pleural effusions  - CT chest showing loculated mild to moderate left pleural effusion. Suspect left lower lobe consolidation superimposed on atelectasis. Focal stenosis of a left lower lobe segmental bronchus also seen. Differentials included atelectasis, pneumonia and neoplasm. and Moderate pericardial effusion.   - low concern for PE given patient on Eliquis, not hypoxic, not tachycardic, and hypotension resolving with fluids.  - continue CAP treatment; Azithromycin (4/14-4/16), Ceftriaxone (4/14-4/18)  - pulm following- Plan for thoracentesis tomorrow

## 2021-04-16 NOTE — PROGRESS NOTE ADULT - SUBJECTIVE AND OBJECTIVE BOX
OVERNIGHT EVENTS: DIANA.    SUBJECTIVE / INTERVAL HPI: Patient seen and examined at bedside.     VITAL SIGNS:  Vital Signs Last 24 Hrs  T(C): 36.6 (2021 06:14), Max: 36.7 (15 Apr 2021 16:30)  T(F): 97.9 (2021 06:14), Max: 98.1 (15 Apr 2021 16:30)  HR: 74 (2021 06:14) (63 - 74)  BP: 131/71 (2021 06:14) (113/64 - 141/63)  BP(mean): --  RR: 18 (2021 06:14) (17 - 18)  SpO2: 93% (2021 06:14) (93% - 95%)    PHYSICAL EXAM:    General: Male who appears much younger than stated age, NAD  HEENT: NC/AT; PERRL, anicteric sclera; MM dry  Neck: supple  Cardiovascular: +S1/S2; RRR   Respiratory: CTA B/L; Decreased breath sound L lung base  Gastrointestinal: soft, NT/ND; +BSx4  : [place] cath in place  Extremities: WWP; no edema. no tenderness but R calf > L calf  Vascular: 2+ radial, DP/PT pulses B/L  Neurological: AAOx3; no focal deficits    MEDICATIONS:  MEDICATIONS  (STANDING):  azithromycin  IVPB 500 milliGRAM(s) IV Intermittent every 24 hours  cefTRIAXone   IVPB 1000 milliGRAM(s) IV Intermittent every 24 hours  cyanocobalamin 1000 MICROGram(s) Oral daily  iron sucrose IVPB 200 milliGRAM(s) IV Intermittent every 24 hours  multivitamin 1 Tablet(s) Oral daily  pantoprazole    Tablet 40 milliGRAM(s) Oral before breakfast    MEDICATIONS  (PRN):  guaiFENesin   Syrup  (Sugar-Free) 200 milliGRAM(s) Oral every 6 hours PRN Cough      ALLERGIES:  Allergies    No Known Allergies    Intolerances        LABS:                        10.4   7.52  )-----------( 186      ( 15 Apr 2021 09:39 )             31.7     04-15    141  |  110<H>  |  25<H>  ----------------------------<  116<H>  4.0   |  19<L>  |  1.46<H>    Ca    9.1      15 Apr 2021 09:39  Phos  2.9     04-15  Mg     1.8     -15    TPro  6.5  /  Alb  3.6  /  TBili  0.7  /  DBili  x   /  AST  17  /  ALT  12  /  AlkPhos  67  -15      Urinalysis Basic - ( 2021 08:52 )    Color: Yellow / Appearance: Clear / S.020 / pH: x  Gluc: x / Ketone: Trace mg/dL  / Bili: Negative / Urobili: 0.2 E.U./dL   Blood: x / Protein: NEGATIVE mg/dL / Nitrite: NEGATIVE   Leuk Esterase: NEGATIVE / RBC: x / WBC x   Sq Epi: x / Non Sq Epi: x / Bacteria: x      CAPILLARY BLOOD GLUCOSE          RADIOLOGY & ADDITIONAL TESTS: Reviewed.   OVERNIGHT EVENTS: DIANA.    SUBJECTIVE / INTERVAL HPI: Patient seen and examined at bedside. States that he feels a lot better symptomatically but not back to himself yet. Appetite has not returned and he still feels weak. He cancelled his upcoming trip to [facility] and says he will focus on getting better. Worried about "fluid around heart" because he says his sister  from that.     VITAL SIGNS:  Vital Signs Last 24 Hrs  T(C): 36.6 (2021 06:14), Max: 36.7 (15 Apr 2021 16:30)  T(F): 97.9 (2021 06:14), Max: 98.1 (15 Apr 2021 16:30)  HR: 74 (2021 06:14) (63 - 74)  BP: 131/71 (2021 06:14) (113/64 - 141/63)  BP(mean): --  RR: 18 (2021 06:14) (17 - 18)  SpO2: 93% (2021 06:14) (93% - 95%)    PHYSICAL EXAM:    General: Male who appears much younger than stated age, NAD  HEENT: NC/AT; PERRL, anicteric sclera; MM dry  Neck: supple  Cardiovascular: +S1/S2; RRR   Respiratory: CTA B/L; Decreased breath sound L lung base  Gastrointestinal: soft, NT/ND; +BSx4  : [place] cath in place  Extremities: WWP; no edema. no tenderness but R calf > L calf  Vascular: 2+ radial, DP/PT pulses B/L  Neurological: AAOx3; no focal deficits    MEDICATIONS:  MEDICATIONS  (STANDING):  azithromycin  IVPB 500 milliGRAM(s) IV Intermittent every 24 hours  cefTRIAXone   IVPB 1000 milliGRAM(s) IV Intermittent every 24 hours  cyanocobalamin 1000 MICROGram(s) Oral daily  iron sucrose IVPB 200 milliGRAM(s) IV Intermittent every 24 hours  multivitamin 1 Tablet(s) Oral daily  pantoprazole    Tablet 40 milliGRAM(s) Oral before breakfast    MEDICATIONS  (PRN):  guaiFENesin   Syrup  (Sugar-Free) 200 milliGRAM(s) Oral every 6 hours PRN Cough      ALLERGIES:  Allergies    No Known Allergies    Intolerances        LABS:                        10.4   7.52  )-----------( 186      ( 15 Apr 2021 09:39 )             31.7     04-15    141  |  110<H>  |  25<H>  ----------------------------<  116<H>  4.0   |  19<L>  |  1.46<H>    Ca    9.1      15 Apr 2021 09:39  Phos  2.9     04-15  Mg     1.8     -15    TPro  6.5  /  Alb  3.6  /  TBili  0.7  /  DBili  x   /  AST  17  /  ALT  12  /  AlkPhos  67  04-15      Urinalysis Basic - ( 2021 08:52 )    Color: Yellow / Appearance: Clear / S.020 / pH: x  Gluc: x / Ketone: Trace mg/dL  / Bili: Negative / Urobili: 0.2 E.U./dL   Blood: x / Protein: NEGATIVE mg/dL / Nitrite: NEGATIVE   Leuk Esterase: NEGATIVE / RBC: x / WBC x   Sq Epi: x / Non Sq Epi: x / Bacteria: x      CAPILLARY BLOOD GLUCOSE          RADIOLOGY & ADDITIONAL TESTS: Reviewed.

## 2021-04-16 NOTE — PROGRESS NOTE ADULT - SUBJECTIVE AND OBJECTIVE BOX
PULMONARY CONSULT SERVICE FOLLOW-UP NOTE    INTERVAL HPI:  Reviewed chart and overnight events; patient seen and examined at bedside. Patient states he is breathing well today. No significant cough or any other respiratory complaints.     MEDICATIONS:  Pulmonary:  guaiFENesin   Syrup  (Sugar-Free) 200 milliGRAM(s) Oral every 6 hours PRN    Antimicrobials:  cefTRIAXone   IVPB 1000 milliGRAM(s) IV Intermittent every 24 hours    Anticoagulants:  enoxaparin Injectable 50 milliGRAM(s) SubCutaneous once    Cardiac:  metoprolol succinate ER 25 milliGRAM(s) Oral daily    Allergies    No Known Allergies    Intolerances    Vital Signs Last 24 Hrs  T(C): 36.9 (16 Apr 2021 13:44), Max: 36.9 (16 Apr 2021 13:44)  T(F): 98.4 (16 Apr 2021 13:44), Max: 98.4 (16 Apr 2021 13:44)  HR: 75 (16 Apr 2021 13:44) (63 - 75)  BP: 134/70 (16 Apr 2021 13:44) (113/64 - 134/70)  BP(mean): --  RR: 18 (16 Apr 2021 13:44) (18 - 18)  SpO2: 93% (16 Apr 2021 13:44) (93% - 93%)    04-15 @ 07:01  -  04-16 @ 07:00  --------------------------------------------------------  IN: 0 mL / OUT: 625 mL / NET: -625 mL    PHYSICAL EXAM:  Constitutional: WDWN  HEENT: NC/AT; PERRL, anicteric sclera; MMM  Neck: supple  Cardiovascular: +S1/S2, RRR  Respiratory: CTA B/L; no W/R/R  Gastrointestinal: soft, NT/ND  Extremities: WWP; no edema, clubbing or cyanosis  Vascular: 2+ radial pulses B/L  Neurological: awake and alert; COCHRAN    LABS:  CBC Full  -  ( 16 Apr 2021 10:14 )  WBC Count : 7.34 K/uL  RBC Count : 3.35 M/uL  Hemoglobin : 10.4 g/dL  Hematocrit : 32.1 %  Platelet Count - Automated : 201 K/uL  Mean Cell Volume : 95.8 fl  Mean Cell Hemoglobin : 31.0 pg  Mean Cell Hemoglobin Concentration : 32.4 gm/dL  Auto Neutrophil # : 2.36 K/uL  Auto Lymphocyte # : 4.43 K/uL  Auto Monocyte # : 0.40 K/uL  Auto Eosinophil # : 0.10 K/uL  Auto Basophil # : 0.03 K/uL  Auto Neutrophil % : 32.1 %  Auto Lymphocyte % : 60.4 %  Auto Monocyte % : 5.4 %  Auto Eosinophil % : 1.4 %  Auto Basophil % : 0.4 %    04-16    144  |  112<H>  |  23  ----------------------------<  108<H>  4.1   |  21<L>  |  1.20    Ca    8.8      16 Apr 2021 10:14  Phos  3.1     04-16  Mg     2.0     04-16    TPro  6.4  /  Alb  3.3  /  TBili  0.4  /  DBili  x   /  AST  19  /  ALT  12  /  AlkPhos  68  04-16  PT/INR - ( 16 Apr 2021 10:14 )   PT: 14.9 sec;   INR: 1.26     PTT - ( 16 Apr 2021 10:14 )  PTT:32.2 sec    RADIOLOGY & ADDITIONAL STUDIES:  CXR 4/16: resolution of L pleural effusion, no pneumothorax

## 2021-04-17 ENCOUNTER — TRANSCRIPTION ENCOUNTER (OUTPATIENT)
Age: 86
End: 2021-04-17

## 2021-04-17 VITALS
OXYGEN SATURATION: 97 % | TEMPERATURE: 98 F | HEART RATE: 70 BPM | RESPIRATION RATE: 15 BRPM | DIASTOLIC BLOOD PRESSURE: 68 MMHG | SYSTOLIC BLOOD PRESSURE: 118 MMHG

## 2021-04-17 LAB
ALBUMIN SERPL ELPH-MCNC: 3.3 G/DL — SIGNIFICANT CHANGE UP (ref 3.3–5)
ALP SERPL-CCNC: 68 U/L — SIGNIFICANT CHANGE UP (ref 40–120)
ALT FLD-CCNC: 13 U/L — SIGNIFICANT CHANGE UP (ref 10–45)
ANION GAP SERPL CALC-SCNC: 11 MMOL/L — SIGNIFICANT CHANGE UP (ref 5–17)
AST SERPL-CCNC: 20 U/L — SIGNIFICANT CHANGE UP (ref 10–40)
B PERT IGG+IGM PNL SER: SIGNIFICANT CHANGE UP
BASOPHILS # BLD AUTO: 0.03 K/UL — SIGNIFICANT CHANGE UP (ref 0–0.2)
BASOPHILS NFR BLD AUTO: 0.4 % — SIGNIFICANT CHANGE UP (ref 0–2)
BILIRUB SERPL-MCNC: 0.5 MG/DL — SIGNIFICANT CHANGE UP (ref 0.2–1.2)
BUN SERPL-MCNC: 19 MG/DL — SIGNIFICANT CHANGE UP (ref 7–23)
CALCIUM SERPL-MCNC: 8.8 MG/DL — SIGNIFICANT CHANGE UP (ref 8.4–10.5)
CHLORIDE SERPL-SCNC: 111 MMOL/L — HIGH (ref 96–108)
CO2 SERPL-SCNC: 22 MMOL/L — SIGNIFICANT CHANGE UP (ref 22–31)
COLOR FLD: YELLOW — SIGNIFICANT CHANGE UP
COMMENT - FLUIDS: SIGNIFICANT CHANGE UP
CREAT SERPL-MCNC: 1.15 MG/DL — SIGNIFICANT CHANGE UP (ref 0.5–1.3)
EOSINOPHIL # BLD AUTO: 0.06 K/UL — SIGNIFICANT CHANGE UP (ref 0–0.5)
EOSINOPHIL # FLD: 1 % — SIGNIFICANT CHANGE UP
EOSINOPHIL NFR BLD AUTO: 0.8 % — SIGNIFICANT CHANGE UP (ref 0–6)
FLUID INTAKE SUBSTANCE CLASS: SIGNIFICANT CHANGE UP
FLUID SEGMENTED GRANULOCYTES: 5 % — SIGNIFICANT CHANGE UP
GLUCOSE SERPL-MCNC: 117 MG/DL — HIGH (ref 70–99)
HCT VFR BLD CALC: 32.1 % — LOW (ref 39–50)
HGB BLD-MCNC: 10.5 G/DL — LOW (ref 13–17)
IMM GRANULOCYTES NFR BLD AUTO: 0.3 % — SIGNIFICANT CHANGE UP (ref 0–1.5)
LDH SERPL L TO P-CCNC: 285 U/L — HIGH (ref 50–242)
LYMPHOCYTES # BLD AUTO: 4.11 K/UL — HIGH (ref 1–3.3)
LYMPHOCYTES # BLD AUTO: 55.6 % — HIGH (ref 13–44)
LYMPHOCYTES # FLD: 77 % — SIGNIFICANT CHANGE UP
MAGNESIUM SERPL-MCNC: 1.8 MG/DL — SIGNIFICANT CHANGE UP (ref 1.6–2.6)
MCHC RBC-ENTMCNC: 30.9 PG — SIGNIFICANT CHANGE UP (ref 27–34)
MCHC RBC-ENTMCNC: 32.7 GM/DL — SIGNIFICANT CHANGE UP (ref 32–36)
MCV RBC AUTO: 94.4 FL — SIGNIFICANT CHANGE UP (ref 80–100)
MONOCYTES # BLD AUTO: 0.36 K/UL — SIGNIFICANT CHANGE UP (ref 0–0.9)
MONOCYTES NFR BLD AUTO: 4.9 % — SIGNIFICANT CHANGE UP (ref 2–14)
MONOS+MACROS # FLD: 17 % — SIGNIFICANT CHANGE UP
NEUTROPHILS # BLD AUTO: 2.81 K/UL — SIGNIFICANT CHANGE UP (ref 1.8–7.4)
NEUTROPHILS NFR BLD AUTO: 38 % — LOW (ref 43–77)
NIGHT BLUE STAIN TISS: SIGNIFICANT CHANGE UP
NRBC # BLD: 0 /100 WBCS — SIGNIFICANT CHANGE UP (ref 0–0)
PHOSPHATE SERPL-MCNC: 2.9 MG/DL — SIGNIFICANT CHANGE UP (ref 2.5–4.5)
PLATELET # BLD AUTO: 198 K/UL — SIGNIFICANT CHANGE UP (ref 150–400)
POTASSIUM SERPL-MCNC: 3.9 MMOL/L — SIGNIFICANT CHANGE UP (ref 3.5–5.3)
POTASSIUM SERPL-SCNC: 3.9 MMOL/L — SIGNIFICANT CHANGE UP (ref 3.5–5.3)
PROT SERPL-MCNC: 6.1 G/DL — SIGNIFICANT CHANGE UP (ref 6–8.3)
RBC # BLD: 3.4 M/UL — LOW (ref 4.2–5.8)
RBC # FLD: 13.9 % — SIGNIFICANT CHANGE UP (ref 10.3–14.5)
RCV VOL RI: 2000 /UL — HIGH (ref 0–0)
SODIUM SERPL-SCNC: 144 MMOL/L — SIGNIFICANT CHANGE UP (ref 135–145)
SPECIMEN SOURCE FLD: SIGNIFICANT CHANGE UP
SPECIMEN SOURCE: SIGNIFICANT CHANGE UP
TOTAL NUCLEATED CELL COUNT, BODY FLUID: 617 /UL — SIGNIFICANT CHANGE UP
TUBE TYPE: SIGNIFICANT CHANGE UP
WBC # BLD: 7.39 K/UL — SIGNIFICANT CHANGE UP (ref 3.8–10.5)
WBC # FLD AUTO: 7.39 K/UL — SIGNIFICANT CHANGE UP (ref 3.8–10.5)

## 2021-04-17 PROCEDURE — 99239 HOSP IP/OBS DSCHRG MGMT >30: CPT | Mod: GC

## 2021-04-17 PROCEDURE — 83550 IRON BINDING TEST: CPT

## 2021-04-17 PROCEDURE — 88112 CYTOPATH CELL ENHANCE TECH: CPT

## 2021-04-17 PROCEDURE — 74176 CT ABD & PELVIS W/O CONTRAST: CPT

## 2021-04-17 PROCEDURE — 86900 BLOOD TYPING SEROLOGIC ABO: CPT

## 2021-04-17 PROCEDURE — 82042 OTHER SOURCE ALBUMIN QUAN EA: CPT

## 2021-04-17 PROCEDURE — 85730 THROMBOPLASTIN TIME PARTIAL: CPT

## 2021-04-17 PROCEDURE — 82550 ASSAY OF CK (CPK): CPT

## 2021-04-17 PROCEDURE — 84484 ASSAY OF TROPONIN QUANT: CPT

## 2021-04-17 PROCEDURE — U0005: CPT

## 2021-04-17 PROCEDURE — 83605 ASSAY OF LACTIC ACID: CPT

## 2021-04-17 PROCEDURE — 88305 TISSUE EXAM BY PATHOLOGIST: CPT

## 2021-04-17 PROCEDURE — 86923 COMPATIBILITY TEST ELECTRIC: CPT

## 2021-04-17 PROCEDURE — U0003: CPT

## 2021-04-17 PROCEDURE — 99232 SBSQ HOSP IP/OBS MODERATE 35: CPT | Mod: GC

## 2021-04-17 PROCEDURE — 92610 EVALUATE SWALLOWING FUNCTION: CPT

## 2021-04-17 PROCEDURE — 84157 ASSAY OF PROTEIN OTHER: CPT

## 2021-04-17 PROCEDURE — 85379 FIBRIN DEGRADATION QUANT: CPT

## 2021-04-17 PROCEDURE — 92612 ENDOSCOPY SWALLOW (FEES) VID: CPT

## 2021-04-17 PROCEDURE — 86901 BLOOD TYPING SEROLOGIC RH(D): CPT

## 2021-04-17 PROCEDURE — 85025 COMPLETE CBC W/AUTO DIFF WBC: CPT

## 2021-04-17 PROCEDURE — 71045 X-RAY EXAM CHEST 1 VIEW: CPT

## 2021-04-17 PROCEDURE — 81003 URINALYSIS AUTO W/O SCOPE: CPT

## 2021-04-17 PROCEDURE — 82945 GLUCOSE OTHER FLUID: CPT

## 2021-04-17 PROCEDURE — 86769 SARS-COV-2 COVID-19 ANTIBODY: CPT

## 2021-04-17 PROCEDURE — P9016: CPT

## 2021-04-17 PROCEDURE — 87075 CULTR BACTERIA EXCEPT BLOOD: CPT

## 2021-04-17 PROCEDURE — 83986 ASSAY PH BODY FLUID NOS: CPT

## 2021-04-17 PROCEDURE — 80053 COMPREHEN METABOLIC PANEL: CPT

## 2021-04-17 PROCEDURE — 84478 ASSAY OF TRIGLYCERIDES: CPT

## 2021-04-17 PROCEDURE — 97161 PT EVAL LOW COMPLEX 20 MIN: CPT

## 2021-04-17 PROCEDURE — 87070 CULTURE OTHR SPECIMN AEROBIC: CPT

## 2021-04-17 PROCEDURE — 89051 BODY FLUID CELL COUNT: CPT

## 2021-04-17 PROCEDURE — 86850 RBC ANTIBODY SCREEN: CPT

## 2021-04-17 PROCEDURE — 82553 CREATINE MB FRACTION: CPT

## 2021-04-17 PROCEDURE — 87086 URINE CULTURE/COLONY COUNT: CPT

## 2021-04-17 PROCEDURE — 93970 EXTREMITY STUDY: CPT

## 2021-04-17 PROCEDURE — 99285 EMERGENCY DEPT VISIT HI MDM: CPT | Mod: 25

## 2021-04-17 PROCEDURE — 82728 ASSAY OF FERRITIN: CPT

## 2021-04-17 PROCEDURE — 83615 LACTATE (LD) (LDH) ENZYME: CPT

## 2021-04-17 PROCEDURE — 83880 ASSAY OF NATRIURETIC PEPTIDE: CPT

## 2021-04-17 PROCEDURE — 85610 PROTHROMBIN TIME: CPT

## 2021-04-17 PROCEDURE — 87116 MYCOBACTERIA CULTURE: CPT

## 2021-04-17 PROCEDURE — 93306 TTE W/DOPPLER COMPLETE: CPT

## 2021-04-17 PROCEDURE — 87015 SPECIMEN INFECT AGNT CONCNTJ: CPT

## 2021-04-17 PROCEDURE — 84100 ASSAY OF PHOSPHORUS: CPT

## 2021-04-17 PROCEDURE — 93005 ELECTROCARDIOGRAM TRACING: CPT

## 2021-04-17 PROCEDURE — 87205 SMEAR GRAM STAIN: CPT

## 2021-04-17 PROCEDURE — 87102 FUNGUS ISOLATION CULTURE: CPT

## 2021-04-17 PROCEDURE — 82570 ASSAY OF URINE CREATININE: CPT

## 2021-04-17 PROCEDURE — 83735 ASSAY OF MAGNESIUM: CPT

## 2021-04-17 PROCEDURE — 36415 COLL VENOUS BLD VENIPUNCTURE: CPT

## 2021-04-17 PROCEDURE — 87206 SMEAR FLUORESCENT/ACID STAI: CPT

## 2021-04-17 PROCEDURE — 36430 TRANSFUSION BLD/BLD COMPNT: CPT

## 2021-04-17 PROCEDURE — 71250 CT THORAX DX C-: CPT

## 2021-04-17 PROCEDURE — 83540 ASSAY OF IRON: CPT

## 2021-04-17 PROCEDURE — 84311 SPECTROPHOTOMETRY: CPT

## 2021-04-17 RX ORDER — MAGNESIUM SULFATE 500 MG/ML
2 VIAL (ML) INJECTION ONCE
Refills: 0 | Status: COMPLETED | OUTPATIENT
Start: 2021-04-17 | End: 2021-04-17

## 2021-04-17 RX ORDER — DIPHENHYDRAMINE HCL 50 MG
25 CAPSULE ORAL ONCE
Refills: 0 | Status: DISCONTINUED | OUTPATIENT
Start: 2021-04-17 | End: 2021-04-17

## 2021-04-17 RX ORDER — FERROUS SULFATE 325(65) MG
1 TABLET ORAL
Qty: 3 | Refills: 0
Start: 2021-04-17 | End: 2021-04-19

## 2021-04-17 RX ADMIN — Medication 50 GRAM(S): at 09:29

## 2021-04-17 RX ADMIN — PANTOPRAZOLE SODIUM 40 MILLIGRAM(S): 20 TABLET, DELAYED RELEASE ORAL at 06:45

## 2021-04-17 RX ADMIN — Medication 1 TABLET(S): at 11:16

## 2021-04-17 RX ADMIN — Medication 25 MILLIGRAM(S): at 06:45

## 2021-04-17 RX ADMIN — PREGABALIN 1000 MICROGRAM(S): 225 CAPSULE ORAL at 11:16

## 2021-04-17 NOTE — PROGRESS NOTE ADULT - SUBJECTIVE AND OBJECTIVE BOX
PULMONARY CONSULT SERVICE FOLLOW-UP NOTE    INTERVAL HPI:  Reviewed chart and overnight events; patient seen and examined at bedside. Patient states he feels well and has no pulmonary complaints.     MEDICATIONS:  Pulmonary:  guaiFENesin   Syrup  (Sugar-Free) 200 milliGRAM(s) Oral every 6 hours PRN    Antimicrobials:  cefTRIAXone   IVPB 1000 milliGRAM(s) IV Intermittent every 24 hours    Anticoagulants:  apixaban 2.5 milliGRAM(s) Oral two times a day    Cardiac:  metoprolol succinate ER 25 milliGRAM(s) Oral daily      Allergies    No Known Allergies    Intolerances        Vital Signs Last 24 Hrs  T(C): 37.2 (17 Apr 2021 06:07), Max: 37.2 (17 Apr 2021 06:07)  T(F): 98.9 (17 Apr 2021 06:07), Max: 98.9 (17 Apr 2021 06:07)  HR: 78 (17 Apr 2021 06:07) (70 - 78)  BP: 123/67 (17 Apr 2021 06:07) (110/68 - 123/67)  BP(mean): --  RR: 18 (17 Apr 2021 06:07) (16 - 18)  SpO2: 95% (17 Apr 2021 06:07) (95% - 98%)    04-17 @ 07:01  -  04-17 @ 15:55  --------------------------------------------------------  IN: 0 mL / OUT: 1050 mL / NET: -1050 mL          PHYSICAL EXAM:  Constitutional: WDWN  HEENT: NC/AT; PERRL, anicteric sclera; MMM  Neck: supple  Cardiovascular: +S1/S2, RRR  Respiratory: CTA B/L; no W/R/R  Gastrointestinal: soft, NT/ND  Extremities: WWP; no edema, clubbing or cyanosis  Vascular: 2+ radial pulses B/L  Neurological: awake and alert; COCHRAN    LABS:      CBC Full  -  ( 17 Apr 2021 07:13 )  WBC Count : 7.39 K/uL  RBC Count : 3.40 M/uL  Hemoglobin : 10.5 g/dL  Hematocrit : 32.1 %  Platelet Count - Automated : 198 K/uL  Mean Cell Volume : 94.4 fl  Mean Cell Hemoglobin : 30.9 pg  Mean Cell Hemoglobin Concentration : 32.7 gm/dL  Auto Neutrophil # : 2.81 K/uL  Auto Lymphocyte # : 4.11 K/uL  Auto Monocyte # : 0.36 K/uL  Auto Eosinophil # : 0.06 K/uL  Auto Basophil # : 0.03 K/uL  Auto Neutrophil % : 38.0 %  Auto Lymphocyte % : 55.6 %  Auto Monocyte % : 4.9 %  Auto Eosinophil % : 0.8 %  Auto Basophil % : 0.4 %    04-17    144  |  111<H>  |  19  ----------------------------<  117<H>  3.9   |  22  |  1.15    Ca    8.8      17 Apr 2021 07:13  Phos  2.9     04-17  Mg     1.8     04-17    TPro  6.1  /  Alb  3.3  /  TBili  0.5  /  DBili  x   /  AST  20  /  ALT  13  /  AlkPhos  68  04-17  PT/INR - ( 16 Apr 2021 10:14 )   PT: 14.9 sec;   INR: 1.26     PTT - ( 16 Apr 2021 10:14 )  PTT:32.2 sec    RADIOLOGY & ADDITIONAL STUDIES:  CXR 4/16: resolution of left pleural effusion, no pneumothorax

## 2021-04-17 NOTE — DISCHARGE NOTE NURSING/CASE MANAGEMENT/SOCIAL WORK - PATIENT PORTAL LINK FT
You can access the FollowMyHealth Patient Portal offered by [facility] by registering at the following website: [email or web address]. By joining [facility]’s FollowMyHealth portal, you will also be able to view your health information using other applications (apps) compatible with our system.

## 2021-04-17 NOTE — PROGRESS NOTE ADULT - ASSESSMENT
Patient is a 89 yo M, 40 pk yr smoking history, PMHx CLL, PUD, anemia, CAD, s/p 2 stents (2010), b/l DVT (2019) and recent PE (after urolift procedure) on Eliquis, cervical radiculopathy, and colon polyps was referred to the ED by his heme/onc provider Dr. Oliveira after she noted new onset atrial fibrillation and shortness of breath.    #Left lower lobe consolidation vs atelectasis  CT with left lower lobe consolidation vs compressive atelectasis from left pleural effusion. Patient without fevers, elevated WBC, change in chronic sputum production so not c/w pneumonia, but patient also has CLL and may have blunted immune response to infection.    -can continue Abx for now    #Left lower lobe segmental bronchus stenosis  Patient with left lower lobe segmental bronchus stenosis, differentials include external compression from enlarged lymph node vs mass.   -patient s/p L thora, appears exudative, will f/u cytology  -can give a dose of lovenox tonight at 10pm (4 hours post-procedure) and if no complications can restart Eliquis tomorrow    #Left pleural effusion, pericardial effusion  Patient with left pleural effusion and pericardial effusion, reports he was told about these [facility] but no history prior to this. Also has pericardial effusion. Differentials include parapneumonic vs malignant vs 2/2 serositis. Effusion is not large but may be partially contributing to patients symptoms of dyspnea. Pericardial effusion does not appear to show tamponade physiology.  -Echo shows no signs of tamponade  -will f/u pleural cytology    #PE  Patient with recent history of provoked PE following prostate surgery. No change in HR or O2 requirements. SOB symptoms likely do not represent Eliquis failure.   -can give a dose of lovenox tonight at 10pm (4 hours post-procedure) and if no complications can restart Eliquis tomorrow    Pulm will follow  
Patient is a 89 yo M, 40 pk yr smoking history, PMHx CLL, PUD, anemia, CAD, s/p 2 stents (2010), b/l DVT (2019) and recent PE (after urolift procedure) on Eliquis, cervical radiculopathy, and colon polyps was referred to the ED by his heme/onc provider Dr. Oliveira after she noted new onset atrial fibrillation and shortness of breath.    #Left lower lobe consolidation vs atelectasis  CT with left lower lobe consolidation vs compressive atelectasis from left pleural effusion. Patient without fevers, elevated WBC, change in chronic sputum production so not c/w pneumonia, but patient also has CLL and may have blunted immune response to infection.    -can continue Abx for now    #Left lower lobe segmental bronchus stenosis  Patient with left lower lobe segmental bronchus stenosis, differentials include external compression from enlarged lymph node vs mass.   -patient s/p L thora, appears exudative, will f/u cytology  -can give a dose of lovenox tonight at 10pm (4 hours post-procedure) and if no complications can restart Eliquis tomorrow    #Left pleural effusion, pericardial effusion  Patient with left pleural effusion and pericardial effusion, reports he was told about these [facility] but no history prior to this. Also has pericardial effusion. Differentials include parapneumonic vs malignant vs 2/2 serositis. Effusion is not large but may be partially contributing to patients symptoms of dyspnea. Pericardial effusion does not appear to show tamponade physiology.  -Echo shows no signs of tamponade  -will f/u pleural cytology    #PE  Patient with recent history of provoked PE following prostate surgery. No change in HR or O2 requirements. SOB symptoms likely do not represent Eliquis failure.   -c/w eliquis    Pulm will follow  
Pt is a 89 yo male with a history of CLL, PUD, gout, anemia, CAD, s/p 2 stents (2010), b/l DVT, and PE (on Eliquis), cervical radiculopathy, and colon polyps presenting from Dr. Oliveira with new shortness of breath, loss of appetite, and weight loss for the past week. CT chest showing LLL consolidation, admitted for treatment of pneumonia.

## 2021-04-17 NOTE — PROGRESS NOTE ADULT - ATTENDING COMMENTS
As above - after period of holding Eliquis we performed bedside thora today yielding straw colored fluid, well tolerated, no ptx on follow up CXR, will await fluid studies.
For discharge today. Pleural fluid was lymphocytic exudate, which would be consistent with malignancy, but cyto and cultures are pending. He has follow up w Dr Cox this week and I will follow up on the fluid studies and arrange follow up in my office as well.
Pt feels well overall. Still endorsing poor appetite. decreased BS at L base. Hgb responded more than appropriately. Appreciate GI recs for outpatient scope at this time. Discussed case w Dr. Oliveira - 2.5 BID eliquis post thoracentesis 4/17 w pulmonary  Anticipate DC 4/18 home w HPT
89M w h/o CAD, CLL, BPH, ATN, AFib, prior DVT, recently admitted to [facility] found to have RLE DVT and PE on CTA - started on eliquis 2.5mg BID, also s/p flex-sig for colonic distention, p/w dyspnea, poor appetite, found to have LLL Consolidation+effusion c/f CAP, ISAURA on CKD, and iron deficiency anemia, s/p 1u w improvement hgb from 7.6 to 10.6, pending thoracentesis 4/16 by pulmonology    Pt feels well. Appetite slightly improved. No cough, chest pain, or dyspnea.     #CAP - c/w CTX+Azithromycin - on RA  #ISAURA on CKD - likely 2/2 poor PO intake. Improving Cr 1.2 today back to baseline  #Iron deficiency anemia - see below  #L sided pleural effusion vs consolidation  #PE - on anticoagulation. Acute. Lower suspicion for tx failure  #DVT does not appear symptomatic at this time   - BLE Dopplers shows c/f chronic remodeling of RLE DVT rather than acute/new DVT  #Afib - noted to be sinus on EKG  #HTN - At target.  #BPH - condom catheter  #CLL - heme following    --F/U Esophagram. FEES nml  --As BP improved - can restart toprol 25  --F/U thoracentesis labs, final pulmonary recs. Will likely need pulm f/u   --Discussed w Dr. Oliveira - pt to restart eliquis at reduced dose 2.5 BID on DC for AF and PE+DVT    DISPO: Home w HPT - anticipate DC 4/17 AM
Pt feels okay today. Reports chronic cough. No chest pain, LH, Palpitations. Reports [facility] and hematuria which resolved. Was not taking AC prior to admission. Denies any hematuria, melena, hematochezia since returning home. Decreased breath sounds noted at L lung base. No wheezing, rales, rhonchi. 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 Worked as .   ----  Obtained readout of d/c summary and studies from Dr. Catrachita Gray at [facility] [phone] EXT [phone] for pt advocate (ask for Malena).   Pt admitted for chest pain - found to have slow AFib, acute R sided PE w acute R popliteal and superficial femoral DVT transitioned from SQL to Eliquis. Also had hematuria which resolved. Noted to have SLOW AFIB.     CTA/PE: b/l PE w mild-moderate pleural effusions. filling defects in RML and RUL PA. debris in the trachea. evidence of atelectasis at bases  TTE 4/1/21 - nml LV/RV funsion 55%. Borderline high PASP. MODERATE PERICARDIAL EFFUSION  BLE Dopplers: acute superficial femoral and popliteal DVT on RIGHT SIDE  Flex Sig: no strictures found. Decompression performed.     DC Medications: amlodipine 5, apixaban 2.5 BID (transitioned from therapeutic lovenox), lidocaine, prilocaine, loratidine daily, toprol 25 daily, oxybutynin 5 TID PRN, Spiriva 2 puffs daily, Vitamin D, FeSO4 PO, Omeprazole.    Team obtaining faxed medical records from [facility].   ----    #ISAURA on CKD - likely 2/2 poor PO intake. Improving  #Iron deficiency anemia - see below  #L sided pleural effusion vs consolidation  #PE - on anticoagulation. Acute. Lower suspicion for tx failure  #DVT does not appear symptomatic at this time  #Afib - noted to be sinus on EKG  #HTN - borderline. Holding BP medications at this time  #BPH - condom catheter  #CLL - heme following    --c/w CTX+Azithromycin at this time. F/U Pulm c/s  --Noted plans for thoracentesis FRI - transition to therapeutic lovenox  --Overall - drop from 11.9 in 03/2021 in HIE. However unclear if drop is acute since d/c [facility] on 4[facility]. Regardless of timing, need to assess source of anemia: DDx include post-procedural from urologic vs slow loss from recent anticoagulation.   --1u RBC after discussion w heme - Dr. Oliveira  --TTE, BLE Doppler    DISPO: Home w HPT - pending improvement of Cr, Stable Hgb, thoracentesis, final heme-onc and anticoagulation recs. Anticipate DC >48h

## 2021-04-17 NOTE — DISCHARGE NOTE NURSING/CASE MANAGEMENT/SOCIAL WORK - NSDCFUADDAPPT_GEN_ALL_CORE_FT
Please bring your Insurance card, Photo ID and Discharge paperwork to the following appointment:    (1) Please follow up with Primary Care Provider, Dr. Jonatan Cxo at [address] [place] [address] on 04/20/2021 at 12:20pm.    Appointment was scheduled by Ms. YAMILA Pascual, Referral Coordinator.    Please follow up with Dr. Bill, we tried to make an appointment with her office.  However were unable to reach her. Please call the office to make the appointment.

## 2021-04-19 ENCOUNTER — NON-APPOINTMENT (OUTPATIENT)
Age: 86
End: 2021-04-19

## 2021-04-20 ENCOUNTER — APPOINTMENT (OUTPATIENT)
Dept: INTERNAL MEDICINE | Facility: CLINIC | Age: 86
End: 2021-04-20

## 2021-04-21 LAB
CULTURE RESULTS: NO GROWTH — SIGNIFICANT CHANGE UP
NON-GYNECOLOGICAL CYTOLOGY STUDY: SIGNIFICANT CHANGE UP
SPECIMEN SOURCE: SIGNIFICANT CHANGE UP

## 2021-04-22 LAB
CHOLEST FLD-MCNC: 62 MG/DL — SIGNIFICANT CHANGE UP
TRIGL FLD-MCNC: 22 MG/DL — SIGNIFICANT CHANGE UP

## 2021-04-26 ENCOUNTER — NON-APPOINTMENT (OUTPATIENT)
Age: 86
End: 2021-04-26

## 2021-04-28 DIAGNOSIS — Z85.9 PERSONAL HISTORY OF MALIGNANT NEOPLASM, UNSPECIFIED: ICD-10-CM

## 2021-04-28 DIAGNOSIS — Z95.5 PRESENCE OF CORONARY ANGIOPLASTY IMPLANT AND GRAFT: ICD-10-CM

## 2021-04-28 DIAGNOSIS — N17.9 ACUTE KIDNEY FAILURE, UNSPECIFIED: ICD-10-CM

## 2021-04-28 DIAGNOSIS — I12.9 HYPERTENSIVE CHRONIC KIDNEY DISEASE WITH STAGE 1 THROUGH STAGE 4 CHRONIC KIDNEY DISEASE, OR UNSPECIFIED CHRONIC KIDNEY DISEASE: ICD-10-CM

## 2021-04-28 DIAGNOSIS — Z82.49 FAMILY HISTORY OF ISCHEMIC HEART DISEASE AND OTHER DISEASES OF THE CIRCULATORY SYSTEM: ICD-10-CM

## 2021-04-28 DIAGNOSIS — E44.0 MODERATE PROTEIN-CALORIE MALNUTRITION: ICD-10-CM

## 2021-04-28 DIAGNOSIS — Z86.718 PERSONAL HISTORY OF OTHER VENOUS THROMBOSIS AND EMBOLISM: ICD-10-CM

## 2021-04-28 DIAGNOSIS — I31.3 PERICARDIAL EFFUSION (NONINFLAMMATORY): ICD-10-CM

## 2021-04-28 DIAGNOSIS — I48.91 UNSPECIFIED ATRIAL FIBRILLATION: ICD-10-CM

## 2021-04-28 DIAGNOSIS — N18.30 CHRONIC KIDNEY DISEASE, STAGE 3 UNSPECIFIED: ICD-10-CM

## 2021-04-28 DIAGNOSIS — D62 ACUTE POSTHEMORRHAGIC ANEMIA: ICD-10-CM

## 2021-04-28 DIAGNOSIS — N40.0 BENIGN PROSTATIC HYPERPLASIA WITHOUT LOWER URINARY TRACT SYMPTOMS: ICD-10-CM

## 2021-04-28 DIAGNOSIS — D69.6 THROMBOCYTOPENIA, UNSPECIFIED: ICD-10-CM

## 2021-04-28 DIAGNOSIS — M54.12 RADICULOPATHY, CERVICAL REGION: ICD-10-CM

## 2021-04-28 DIAGNOSIS — I25.10 ATHEROSCLEROTIC HEART DISEASE OF NATIVE CORONARY ARTERY WITHOUT ANGINA PECTORIS: ICD-10-CM

## 2021-04-28 DIAGNOSIS — M10.9 GOUT, UNSPECIFIED: ICD-10-CM

## 2021-04-28 DIAGNOSIS — Z90.49 ACQUIRED ABSENCE OF OTHER SPECIFIED PARTS OF DIGESTIVE TRACT: ICD-10-CM

## 2021-04-28 DIAGNOSIS — J18.9 PNEUMONIA, UNSPECIFIED ORGANISM: ICD-10-CM

## 2021-04-28 DIAGNOSIS — C91.90 LYMPHOID LEUKEMIA, UNSPECIFIED NOT HAVING ACHIEVED REMISSION: ICD-10-CM

## 2021-04-28 DIAGNOSIS — J44.0 CHRONIC OBSTRUCTIVE PULMONARY DISEASE WITH (ACUTE) LOWER RESPIRATORY INFECTION: ICD-10-CM

## 2021-04-28 DIAGNOSIS — R06.02 SHORTNESS OF BREATH: ICD-10-CM

## 2021-04-28 DIAGNOSIS — Z79.01 LONG TERM (CURRENT) USE OF ANTICOAGULANTS: ICD-10-CM

## 2021-04-28 DIAGNOSIS — K27.9 PEPTIC ULCER, SITE UNSPECIFIED, UNSPECIFIED AS ACUTE OR CHRONIC, WITHOUT HEMORRHAGE OR PERFORATION: ICD-10-CM

## 2021-04-28 DIAGNOSIS — J90 PLEURAL EFFUSION, NOT ELSEWHERE CLASSIFIED: ICD-10-CM

## 2021-04-28 DIAGNOSIS — I26.99 OTHER PULMONARY EMBOLISM WITHOUT ACUTE COR PULMONALE: ICD-10-CM

## 2021-04-28 DIAGNOSIS — D50.9 IRON DEFICIENCY ANEMIA, UNSPECIFIED: ICD-10-CM

## 2021-04-28 DIAGNOSIS — I95.9 HYPOTENSION, UNSPECIFIED: ICD-10-CM

## 2021-05-15 LAB
CULTURE RESULTS: SIGNIFICANT CHANGE UP
SPECIMEN SOURCE: SIGNIFICANT CHANGE UP

## 2021-05-25 ENCOUNTER — APPOINTMENT (OUTPATIENT)
Dept: HEART AND VASCULAR | Facility: CLINIC | Age: 86
End: 2021-05-25
Payer: MEDICARE

## 2021-05-25 VITALS
TEMPERATURE: 98.2 F | RESPIRATION RATE: 16 BRPM | BODY MASS INDEX: 21.51 KG/M2 | WEIGHT: 137.03 LBS | HEART RATE: 62 BPM | DIASTOLIC BLOOD PRESSURE: 50 MMHG | HEIGHT: 67 IN | SYSTOLIC BLOOD PRESSURE: 108 MMHG | OXYGEN SATURATION: 96 %

## 2021-05-25 DIAGNOSIS — E78.5 HYPERLIPIDEMIA, UNSPECIFIED: ICD-10-CM

## 2021-05-25 PROCEDURE — 99072 ADDL SUPL MATRL&STAF TM PHE: CPT

## 2021-05-25 PROCEDURE — 99214 OFFICE O/P EST MOD 30 MIN: CPT

## 2021-05-25 PROCEDURE — 93000 ELECTROCARDIOGRAM COMPLETE: CPT

## 2021-05-25 RX ORDER — SULFAMETHOXAZOLE AND TRIMETHOPRIM 800; 160 MG/1; MG/1
800-160 TABLET ORAL TWICE DAILY
Qty: 28 | Refills: 0 | Status: DISCONTINUED | COMMUNITY
Start: 2021-03-17 | End: 2021-05-25

## 2021-05-25 RX ORDER — SULFAMETHOXAZOLE AND TRIMETHOPRIM 800; 160 MG/1; MG/1
800-160 TABLET ORAL
Qty: 20 | Refills: 0 | Status: DISCONTINUED | COMMUNITY
Start: 2021-01-11 | End: 2021-05-25

## 2021-05-25 RX ORDER — OMEPRAZOLE 40 MG/1
40 CAPSULE, DELAYED RELEASE ORAL
Qty: 90 | Refills: 3 | Status: DISCONTINUED | COMMUNITY
Start: 2018-02-21 | End: 2021-05-25

## 2021-05-25 NOTE — PHYSICAL EXAM
[Well Developed] : well developed [Normal S1, S2] : normal S1, S2 [No Edema] : no edema [Moves all extremities] : moves all extremities [Normal] : alert and oriented, normal memory

## 2021-05-25 NOTE — REVIEW OF SYSTEMS
[Fever] : no fever [Headache] : no headache [Blurry Vision] : no blurred vision [Hearing Loss] : no hearing loss

## 2021-05-25 NOTE — HISTORY OF PRESENT ILLNESS
[FreeTextEntry1] : 89 year male who comes for Cardiology followup after rehab. He denies having chest pain, SOB, FISH, dizziness, palpitations, orthopnea, PND or syncope. He plans to travel to [facility] in September

## 2021-05-26 ENCOUNTER — APPOINTMENT (OUTPATIENT)
Dept: INTERNAL MEDICINE | Facility: CLINIC | Age: 86
End: 2021-05-26
Payer: MEDICARE

## 2021-05-26 VITALS
SYSTOLIC BLOOD PRESSURE: 110 MMHG | WEIGHT: 137 LBS | OXYGEN SATURATION: 96 % | TEMPERATURE: 98.2 F | HEIGHT: 67 IN | HEART RATE: 67 BPM | BODY MASS INDEX: 21.5 KG/M2 | DIASTOLIC BLOOD PRESSURE: 60 MMHG

## 2021-05-26 DIAGNOSIS — I26.99 OTHER PULMONARY EMBOLISM W/OUT ACUTE COR PULMONALE: ICD-10-CM

## 2021-05-26 PROCEDURE — 99072 ADDL SUPL MATRL&STAF TM PHE: CPT

## 2021-05-26 PROCEDURE — 99214 OFFICE O/P EST MOD 30 MIN: CPT

## 2021-05-26 NOTE — HISTORY OF PRESENT ILLNESS
[de-identified] : 89 yo M w/ hx CLL, DVT, PUD ,CKD 3,  gout, CAD, s/p 2 stents(last in 2010 ),colon polyps \par recent PE after Urolift procedure\par admitted to [facility] 4/13-4/17 for pleural effusion, s/p thoracentesis, neg for malignancy\par Was in Rehab but now at home.  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  rest has helped.  massage helps- Given prednisone 5mg for 30 days.\par Plan to leave for [facility] in September\par s/p eval with Dr Hodge on 5/25.

## 2021-05-26 NOTE — PLAN
[FreeTextEntry1] : \par \par Knee pain - ? pseudo gout - \par plan to start to wean - start qod-\par \par PE to continue on Eliquis for  at least 3 months.\par \par HTN- controlled\par

## 2021-05-26 NOTE — PHYSICAL EXAM
[Normal Rate] : normal rate  [Regular Rhythm] : with a regular rhythm [Normal] : affect was normal and insight and judgment were intact [de-identified] : left knee- no swelling, no erythema

## 2021-05-27 ENCOUNTER — APPOINTMENT (OUTPATIENT)
Dept: UROLOGY | Facility: CLINIC | Age: 86
End: 2021-05-27
Payer: MEDICARE

## 2021-05-27 VITALS — SYSTOLIC BLOOD PRESSURE: 112 MMHG | HEART RATE: 66 BPM | TEMPERATURE: 97.7 F | DIASTOLIC BLOOD PRESSURE: 56 MMHG

## 2021-05-27 PROCEDURE — 99213 OFFICE O/P EST LOW 20 MIN: CPT

## 2021-05-27 PROCEDURE — 51798 US URINE CAPACITY MEASURE: CPT

## 2021-05-27 PROCEDURE — 99072 ADDL SUPL MATRL&STAF TM PHE: CPT

## 2021-05-27 NOTE — ASSESSMENT
[FreeTextEntry1] : 89 year old male with BPH\par s/p urolift\par Reports good improvement in urinary symptoms after procedure, nocturia x3 \par no more compfortable\par Will titrate down his BPH medications, continue with Alfuzosin and Finasteride \par Stop Oxybutynin 5mg\par PVR today to r/o retention: 30ml\par PE after urolift, on Eliquis for at least 3 more months per Dr. Cox's note \par Patient is going to [facility] over the summer\par PVR today 91cc - to r/o retetion\par F/U in 5 months (October)  - will consider dc alfuzosin at that time\par

## 2021-05-27 NOTE — HISTORY OF PRESENT ILLNESS
[FreeTextEntry1] : returns for follow up s/p urolift \par Was at [facility] d/c on 5/10/21\par Starting next week, will have home attendants 5 days/week \par Currently on alfuzosin, finasteride, oxybutynin 5mg \par No urinary complaints. Nocturia x3.\par  On Eliquis for PE, will continue for at least 3 more months per 's note

## 2021-06-02 ENCOUNTER — NON-APPOINTMENT (OUTPATIENT)
Age: 86
End: 2021-06-02

## 2021-06-05 LAB
CULTURE RESULTS: SIGNIFICANT CHANGE UP
SPECIMEN SOURCE: SIGNIFICANT CHANGE UP

## 2021-06-10 ENCOUNTER — APPOINTMENT (OUTPATIENT)
Dept: VASCULAR SURGERY | Facility: CLINIC | Age: 86
End: 2021-06-10
Payer: MEDICARE

## 2021-06-10 PROCEDURE — 99072 ADDL SUPL MATRL&STAF TM PHE: CPT

## 2021-06-10 PROCEDURE — 93880 EXTRACRANIAL BILAT STUDY: CPT

## 2021-06-10 PROCEDURE — 99213 OFFICE O/P EST LOW 20 MIN: CPT

## 2021-06-16 NOTE — HISTORY OF PRESENT ILLNESS
[FreeTextEntry1] : 89yoM w/h/o CLL, PUD, gout, CAD, s/p PTCA x 2 (last in 2010 ), colon polyps hx recurrent PE/bilateral DVT (eliquis), presenting to office for carotid FU. He went to the ED 4/2021 for SOB and diagnosed with pneumonia, treated with abx. CT chest negative for PE, BLE venous US negative for acute DVT. We are monitoring his bilateral carotid stenosis.

## 2021-06-16 NOTE — PROCEDURE
[FreeTextEntry1] : Carotid duplex done today to evaluate carotid stenosis shows left 253/82, right 152/52

## 2021-06-16 NOTE — ASSESSMENT
[FreeTextEntry1] : 89yoM w/h/o CLL, PUD, gout, CAD, s/p PTCA x 2 (last in 2010 ), colon polyps hx recurrent PE/bilateral DVT (eliquis), presenting to office for carotid FU. He remains asymptomatic, left 253/82- stable. Will continue to FU every 6 months, will call sooner with any issues.

## 2021-06-16 NOTE — PHYSICAL EXAM
[Normal Breath Sounds] : Normal breath sounds [Normal Heart Sounds] : normal heart sounds [2+] : left 2+ [Ankle Swelling Bilaterally] : bilaterally  [Ankle Swelling On The Right] : mild [No Rash or Lesion] : No rash or lesion [Alert] : alert [Calm] : calm [JVD] : no jugular venous distention  [Carotid Bruits] : no carotid bruits [Ankle Swelling (On Exam)] : not present [Varicose Veins Of Lower Extremities] : not present [] : not present [Abdomen Tenderness] : ~T ~M No abdominal tenderness [de-identified] : WN/WD, NAD [de-identified] : NC/AT [de-identified] : FROM throughout, strength 5/5x4 [de-identified] : CNII-XII/HEYDI grossly intact

## 2021-07-02 NOTE — PHYSICAL THERAPY INITIAL EVALUATION ADULT - LEVEL OF INDEPENDENCE: SIT/SUPINE, REHAB EVAL
1. Have you been to the ER, urgent care clinic since your last visit? Hospitalized since your last visit? No    2. Have you seen or consulted any other health care providers outside of the [address] [place] [address] since your last visit? Include any pap smears or colon screening.  No minimum assist (75% patients effort)

## 2021-07-12 ENCOUNTER — NON-APPOINTMENT (OUTPATIENT)
Age: 86
End: 2021-07-12

## 2021-07-14 ENCOUNTER — APPOINTMENT (OUTPATIENT)
Dept: HEMATOLOGY ONCOLOGY | Facility: CLINIC | Age: 86
End: 2021-07-14
Payer: MEDICARE

## 2021-07-14 VITALS
BODY MASS INDEX: 21.66 KG/M2 | HEART RATE: 70 BPM | OXYGEN SATURATION: 96 % | HEIGHT: 67 IN | TEMPERATURE: 97.3 F | SYSTOLIC BLOOD PRESSURE: 125 MMHG | DIASTOLIC BLOOD PRESSURE: 61 MMHG | WEIGHT: 138 LBS

## 2021-07-14 PROCEDURE — 99072 ADDL SUPL MATRL&STAF TM PHE: CPT

## 2021-07-14 PROCEDURE — 36415 COLL VENOUS BLD VENIPUNCTURE: CPT

## 2021-07-14 PROCEDURE — 99214 OFFICE O/P EST MOD 30 MIN: CPT | Mod: 25

## 2021-07-14 RX ORDER — PREDNISONE 20 MG/1
20 TABLET ORAL DAILY
Qty: 5 | Refills: 0 | Status: DISCONTINUED | COMMUNITY
Start: 2021-07-12 | End: 2021-07-14

## 2021-07-14 RX ORDER — PREDNISONE 5 MG/1
5 TABLET ORAL
Qty: 30 | Refills: 0 | Status: DISCONTINUED | COMMUNITY
Start: 2021-05-25 | End: 2021-07-14

## 2021-07-14 NOTE — HISTORY OF PRESENT ILLNESS
[de-identified] : Patient with CLL here for follow up  [de-identified] : He is tolerating Eliquis well without any bruising or bleeding. CBC is stable, Hb and platelet is stable. 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 He is scheduled to see Dr. Muniz from urology tomorrow. \par \par He is on iron tablets, tolerating well. \par \par Received IV Iron x2...\par \par 11-5-2019 getting pains in his feet when he takes the support stockings off...\par \par May 19, 2020 patient requested a follow-up appointment since he had blood work done last week and he wanted to discuss his results\par \par 1/28/2020 pt returns for f/u...seeing multiple docs...[facility], even so his total cholesterol is 164 by their results (which are scanned in...) was also told to go back on Aspirin, but pt has low plt, and had 2 bleeding ulcers...\par \par August 10, 2020 returning for follow-up has no complaints... Unable to visit [facility] this summer because of the pandemic... Keeping social distance for most part\par \par August 18, 2020 patient was found to have worsening thrombocytopenia... I do not believe the thrombocytopenia is secondary to his CLL... Therefore went very carefully over his medication list..\par \par January 21, 2021 patient requested a follow-up appointment to discuss his urinary symptoms... He was seen by urology and he was proposed procedure to improve his nocturia... At this point patient has nocturia x4-5 times and is unable to get a good night sleep...\par \par \par February 11,2021...had repeat blood work yesterday...plt low...\par \par March 13, 2021 patient returns to the office stating "total collapse of the systems"... Unable to eat, short of breath, constipated...\par \par July 14, 2021 returns to the office is doing well... After his recent admission to [facility] for GI bleed, he was [facility] to [facility] where he was diagnosed with pseudogout, and was placed on prednisone with good relief of his left leg pain

## 2021-07-14 NOTE — ASSESSMENT
[FreeTextEntry1] :  Patient with CLL, developed PE post his urological procedure... Was placed on Eliquis, and developed severe hematuria...\par \par Patient returns to the office today is no complaints... He was again placed on prednisone 50 mg daily for 5 days for recurrent pain in his leg...\par \par Repeat blood work done\par \par

## 2021-07-19 LAB
ALBUMIN SERPL ELPH-MCNC: 4.3 G/DL
ALP BLD-CCNC: 69 U/L
ALT SERPL-CCNC: 11 U/L
ANION GAP SERPL CALC-SCNC: 11 MMOL/L
AST SERPL-CCNC: 22 U/L
BASOPHILS # BLD AUTO: 0.01 K/UL
BASOPHILS NFR BLD AUTO: 0.2 %
BILIRUB SERPL-MCNC: 0.4 MG/DL
BUN SERPL-MCNC: 42 MG/DL
CALCIUM SERPL-MCNC: 9.3 MG/DL
CHLORIDE SERPL-SCNC: 104 MMOL/L
CO2 SERPL-SCNC: 24 MMOL/L
CREAT SERPL-MCNC: 1.4 MG/DL
EOSINOPHIL # BLD AUTO: 0.03 K/UL
EOSINOPHIL NFR BLD AUTO: 0.5 %
ERYTHROCYTE [SEDIMENTATION RATE] IN BLOOD BY WESTERGREN METHOD: 7 MM/HR
FERRITIN SERPL-MCNC: 586 NG/ML
GLUCOSE SERPL-MCNC: 151 MG/DL
HCT VFR BLD CALC: 28.6 %
HGB BLD-MCNC: 9.5 G/DL
IMM GRANULOCYTES NFR BLD AUTO: 0.3 %
IRON SATN MFR SERPL: 34 %
IRON SERPL-MCNC: 85 UG/DL
LDH SERPL-CCNC: 265 U/L
LYMPHOCYTES # BLD AUTO: 3.07 K/UL
LYMPHOCYTES NFR BLD AUTO: 48.4 %
MAN DIFF?: NORMAL
MCHC RBC-ENTMCNC: 33.2 GM/DL
MCHC RBC-ENTMCNC: 34.1 PG
MCV RBC AUTO: 102.5 FL
MONOCYTES # BLD AUTO: 0.16 K/UL
MONOCYTES NFR BLD AUTO: 2.5 %
NEUTROPHILS # BLD AUTO: 3.05 K/UL
NEUTROPHILS NFR BLD AUTO: 48.1 %
PLATELET # BLD AUTO: 93 K/UL
POTASSIUM SERPL-SCNC: 5 MMOL/L
PROT SERPL-MCNC: 6.1 G/DL
RBC # BLD: 2.79 M/UL
RBC # FLD: 15.1 %
SODIUM SERPL-SCNC: 140 MMOL/L
TIBC SERPL-MCNC: 251 UG/DL
UIBC SERPL-MCNC: 167 UG/DL
URATE SERPL-MCNC: 8.5 MG/DL
WBC # FLD AUTO: 6.34 K/UL

## 2021-07-23 ENCOUNTER — NON-APPOINTMENT (OUTPATIENT)
Age: 86
End: 2021-07-23

## 2021-07-28 NOTE — SWALLOW BEDSIDE ASSESSMENT ADULT - CONSISTENCIES ADMINISTERED
[FreeTextEntry1] : 41 yo s/p abdominal myomectomy doing well postoperatively. \par -Can resume all activities\par -Scar creams/silicone sheet discussed\par -Return for well woman exam 2 cup sips of thin liq, 1 bite of regular solid

## 2021-09-09 ENCOUNTER — NON-APPOINTMENT (OUTPATIENT)
Age: 86
End: 2021-09-09

## 2021-09-21 ENCOUNTER — RESULT REVIEW (OUTPATIENT)
Age: 86
End: 2021-09-21

## 2021-09-21 ENCOUNTER — OUTPATIENT (OUTPATIENT)
Dept: OUTPATIENT SERVICES | Facility: HOSPITAL | Age: 86
LOS: 1 days | End: 2021-09-21
Payer: MEDICARE

## 2021-09-21 ENCOUNTER — APPOINTMENT (OUTPATIENT)
Dept: ORTHOPEDIC SURGERY | Facility: CLINIC | Age: 86
End: 2021-09-21
Payer: MEDICARE

## 2021-09-21 DIAGNOSIS — Z98.890 OTHER SPECIFIED POSTPROCEDURAL STATES: Chronic | ICD-10-CM

## 2021-09-21 DIAGNOSIS — Z90.49 ACQUIRED ABSENCE OF OTHER SPECIFIED PARTS OF DIGESTIVE TRACT: Chronic | ICD-10-CM

## 2021-09-21 PROCEDURE — 72170 X-RAY EXAM OF PELVIS: CPT | Mod: 26

## 2021-09-21 PROCEDURE — 72170 X-RAY EXAM OF PELVIS: CPT

## 2021-09-21 PROCEDURE — 99204 OFFICE O/P NEW MOD 45 MIN: CPT

## 2021-09-21 PROCEDURE — 73564 X-RAY EXAM KNEE 4 OR MORE: CPT | Mod: 26,50

## 2021-09-21 PROCEDURE — 73564 X-RAY EXAM KNEE 4 OR MORE: CPT

## 2021-09-21 NOTE — DISCUSSION/SUMMARY
[de-identified] : 89y/o male with left hip osteonecrosis and osteoarthritis, bilateral mild knee osteoarthritis\par - He has significant radiographic findings but the symptoms appear to have resolved spontaneously. Surgical management would entail a left total hip replacement but I would not do a total hip at this point; advised ongoing observation; he is in agreement. MAGI can be considered should his symptoms recur, though I would consider him a high risk surgical candidate. The knees demonstrate normal aging changes and will not require any surgery.\par - Tylenol for pain\par - Follow up with me as needed for recurrent symptoms

## 2021-09-21 NOTE — HISTORY OF PRESENT ILLNESS
[4] : a current pain level of 4/10 [Ice] : relieved by ice [Rest] : relieved by rest [de-identified] : 9/21/21: 89y/o male presenting for evaluation of L > R knee and LLE pain. Symptoms arose relatively suddenly while admitted at [facility] for a separate issue (acute RLE/RUE DVT and PE) in April 2021. Symptoms have gradually resolved on their own and he is currently only having minimal left hip and thigh pain. Ambulates with a rollator and can make it about 6 blocks. He is stopped more by cardiovascular fatigue than leg pain. At the time of the pain flare, someone diagnosed him with pseudogout though he does not believe any joint aspiration was ever performed. He denies any joint swelling, redness, or warmth at any time.  [Walking] : worsened by walking [de-identified] : patient describes pain as stiff and localized

## 2021-09-21 NOTE — REVIEW OF SYSTEMS
[Joint Pain] : joint pain [Joint Stiffness] : joint stiffness [Nasal Discharge] : nasal discharge [Sore Throat] : sore throat [Cough] : cough [Sleep Disturbances] : ~T sleep disturbances [Muscle Weakness] : muscle weakness [Negative] : Heme/Lymph

## 2021-09-21 NOTE — PHYSICAL EXAM
[de-identified] : General appearance: well nourished and hydrated, pleasant, alert and oriented x 3, cooperative.  \par HEENT: normocephalic, EOM intact, wearing mask, external auditory canal clear.  \par Cardiovascular: no lower leg edema, no varicosities, dorsalis pedis pulses palpable and symmetric.  \par Lymphatics: no palpable lymphadenopathy, no lymphedema.  \par Neurologic: sensation is normal, no muscle weakness in upper or lower extremities, patella tendon reflexes present and symmetric.  \par Dermatologic: skin moist, warm, no rash.  \par Spine: cervical spine with normal lordosis and painless range of motion, thoracic spine with normal kyphosis and painless range of motion, lumbosacral spine with normal lordosis and painless range of motion. \par Gait: ambulating with rollator, cautious gait with small steps, no antalgia\par \par Left knee:\par - Soft tissue swelling: none anterior; moderate Baker's cyst posteriorly\par - Ecchymosis: none\par - Erythema: none\par - Effusion: none\par - Wounds: none\par - Alignment: normal\par - Tenderness: none\par - ROM: \par - Collateral laxity: none\par - Cruciate laxity: none\par - Popliteal angle (degrees): 60\par - Quad strength: 5/5\par \par Right knee:\par - Soft tissue swelling: none\par - Ecchymosis: none\par - Erythema: none\par - Effusion: none\par - Wounds: none\par - Alignment: normal\par - Tenderness: none\par - ROM: \par - Collateral laxity: none\par - Cruciate laxity: none\par - Popliteal angle (degrees): 60\par - Quad strength: 5/5\par \par Limb lengths: clinically similar\par \par Left hip:\par - Swelling: none\par - Ecchymosis: none\par - Erythema: none\par - Wounds: none\par - Tenderness: none\par - ROM: 100 flexion, 0 extension, 5 adduction, 30 abduction, 10 internal rotation, 30 external rotation\par - BONIFACIO: stiff and uncomfortable\par - FADIR: stiff and uncomfortable\par - Concetta: negative\par - Stinchfield: equivocal\par - Flexor power: 4+/5\par - Abductor power: 4+/5 [de-identified] : AP pelvis and 4 views of the bilateral knees (weightbearing AP, weightbearing Roa, weightbearing lateral, and Sunrise) were obtained today, interpreted by me, and reviewed with the patient.\par \par Pelvic alignment: normal\par \par Right hip --\par Alignment: normal\par Arthritis: mild joint space narrowing\par Deformity: none\par Osteonecrosis: none\par \par Left hip --\par Alignment: normal\par Arthritis: advanced joint space narrowing, nearly bone on bone\par Deformity: none\par Osteonecrosis: cysts and sclerosis throughout femoral neck and head\par \par Right knee --\par Alignment: normal\par Arthritis: moderate diffuse joint space narrowing, KL1\par Patellar height: normal\par Patellar tracking: central\par \par Left knee --\par Alignment: normal\par Arthritis: moderate diffuse joint space narrowing, KL1\par Patellar height: normal\par Patellar tracking: central Lab: [id number] Lab: [id number]

## 2021-09-22 ENCOUNTER — LABORATORY RESULT (OUTPATIENT)
Age: 86
End: 2021-09-22

## 2021-09-22 ENCOUNTER — APPOINTMENT (OUTPATIENT)
Dept: HEMATOLOGY ONCOLOGY | Facility: CLINIC | Age: 86
End: 2021-09-22
Payer: MEDICARE

## 2021-09-22 VITALS
TEMPERATURE: 97.6 F | OXYGEN SATURATION: 98 % | HEART RATE: 56 BPM | SYSTOLIC BLOOD PRESSURE: 127 MMHG | HEIGHT: 67 IN | BODY MASS INDEX: 21.88 KG/M2 | DIASTOLIC BLOOD PRESSURE: 65 MMHG | WEIGHT: 139.38 LBS

## 2021-09-22 DIAGNOSIS — D50.0 IRON DEFICIENCY ANEMIA SECONDARY TO BLOOD LOSS (CHRONIC): ICD-10-CM

## 2021-09-22 PROCEDURE — 99214 OFFICE O/P EST MOD 30 MIN: CPT | Mod: 25

## 2021-09-22 PROCEDURE — 36415 COLL VENOUS BLD VENIPUNCTURE: CPT

## 2021-09-23 PROBLEM — D50.0 IRON DEFICIENCY ANEMIA DUE TO CHRONIC BLOOD LOSS: Status: RESOLVED | Noted: 2019-09-05 | Resolved: 2021-09-23

## 2021-09-23 LAB
25(OH)D3 SERPL-MCNC: 84.1 NG/ML
ALBUMIN SERPL ELPH-MCNC: 4.5 G/DL
ALP BLD-CCNC: 66 U/L
ALT SERPL-CCNC: 13 U/L
ANION GAP SERPL CALC-SCNC: 15 MMOL/L
AST SERPL-CCNC: 24 U/L
BASOPHILS # BLD AUTO: 0.02 K/UL
BASOPHILS NFR BLD AUTO: 0.5 %
BILIRUB SERPL-MCNC: 0.5 MG/DL
BUN SERPL-MCNC: 51 MG/DL
CALCIUM SERPL-MCNC: 9.4 MG/DL
CHLORIDE SERPL-SCNC: 106 MMOL/L
CO2 SERPL-SCNC: 20 MMOL/L
CREAT SERPL-MCNC: 2.04 MG/DL
EOSINOPHIL # BLD AUTO: 0.07 K/UL
EOSINOPHIL NFR BLD AUTO: 1.7 %
ERYTHROCYTE [SEDIMENTATION RATE] IN BLOOD BY WESTERGREN METHOD: 11 MM/HR
FERRITIN SERPL-MCNC: 794 NG/ML
GLUCOSE SERPL-MCNC: 110 MG/DL
HAPTOGLOB SERPL-MCNC: <20 MG/DL
HCT VFR BLD CALC: 29.7 %
HGB BLD-MCNC: 9.8 G/DL
IMM GRANULOCYTES NFR BLD AUTO: 0.2 %
IRON SATN MFR SERPL: 18 %
IRON SERPL-MCNC: 42 UG/DL
LDH SERPL-CCNC: 359 U/L
LYMPHOCYTES # BLD AUTO: 2.38 K/UL
LYMPHOCYTES NFR BLD AUTO: 57.6 %
MAN DIFF?: NORMAL
MCHC RBC-ENTMCNC: 32.2 PG
MCHC RBC-ENTMCNC: 33 GM/DL
MCV RBC AUTO: 97.7 FL
MONOCYTES # BLD AUTO: 0.42 K/UL
MONOCYTES NFR BLD AUTO: 10.2 %
NEUTROPHILS # BLD AUTO: 1.23 K/UL
NEUTROPHILS NFR BLD AUTO: 29.8 %
PLATELET # BLD AUTO: 75 K/UL
POTASSIUM SERPL-SCNC: 4.6 MMOL/L
PROT SERPL-MCNC: 6.5 G/DL
RBC # BLD: 3.04 M/UL
RBC # FLD: 14 %
SODIUM SERPL-SCNC: 142 MMOL/L
TIBC SERPL-MCNC: 233 UG/DL
TSH SERPL-ACNC: 8.31 UIU/ML
UIBC SERPL-MCNC: 191 UG/DL
VIT B12 SERPL-MCNC: >2000 PG/ML
WBC # FLD AUTO: 4.13 K/UL

## 2021-09-23 NOTE — HISTORY OF PRESENT ILLNESS
[de-identified] : Patient with CLL here for follow up  [de-identified] : He is tolerating Eliquis well without any bruising or bleeding. CBC is stable, Hb and platelet is stable. 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 He is scheduled to see Dr. Muniz from urology tomorrow. \par \par He is on iron tablets, tolerating well. \par \par Received IV Iron x2...\par \par 11-5-2019 getting pains in his feet when he takes the support stockings off...\par \par May 19, 2020 patient requested a follow-up appointment since he had blood work done last week and he wanted to discuss his results\par \par 1/28/2020 pt returns for f/u...seeing multiple docs...[facility], even so his total cholesterol is 164 by their results (which are scanned in...) was also told to go back on Aspirin, but pt has low plt, and had 2 bleeding ulcers...\par \par August 10, 2020 returning for follow-up has no complaints... Unable to visit [facility] this summer because of the pandemic... Keeping social distance for most part\par \par August 18, 2020 patient was found to have worsening thrombocytopenia... I do not believe the thrombocytopenia is secondary to his CLL... Therefore went very carefully over his medication list..\par \par January 21, 2021 patient requested a follow-up appointment to discuss his urinary symptoms... He was seen by urology and he was proposed procedure to improve his nocturia... At this point patient has nocturia x4-5 times and is unable to get a good night sleep...\par \par \par February 11,2021...had repeat blood work yesterday...plt low...\par \par March 13, 2021 patient returns to the office stating "total collapse of the systems"... Unable to eat, short of breath, constipated...\par \par July 14, 2021 returns to the office is doing well... After his recent admission to [facility] for GI bleed, he was read[facility] to [facility] where he was diagnosed with pseudogout, and was placed on prednisone with good relief of his left leg pain\par \par September 22, 2021 patient with CLL returns for follow-up several days before going to [facility] for 3 weeks...

## 2021-09-23 NOTE — ASSESSMENT
[FreeTextEntry1] :  Patient with CLL, developed PE post his urological procedure... Was placed on Eliquis, and developed severe hematuria...\par \par Patient returns to the office today is no complaints...\par \par Repeat blood work done and patient was found to have anemia however his iron studies are not consistent with iron deficiency... 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 Patient also has low platelet count, and slightly elevated TSH, I discussed with Dr. Herndon...\par \par Patient okay to travel to [facility] for 3 weeks as planned... He should see us in follow-up upon his return.

## 2021-09-27 LAB
ALBUMIN MFR SERPL ELPH: 65.8 %
ALBUMIN SERPL-MCNC: 4.3 G/DL
ALBUMIN/GLOB SERPL: 2 RATIO
ALPHA1 GLOB MFR SERPL ELPH: 6.3 %
ALPHA1 GLOB SERPL ELPH-MCNC: 0.4 G/DL
ALPHA2 GLOB MFR SERPL ELPH: 8 %
ALPHA2 GLOB SERPL ELPH-MCNC: 0.5 G/DL
B-GLOBULIN MFR SERPL ELPH: 8.4 %
B-GLOBULIN SERPL ELPH-MCNC: 0.5 G/DL
DEPRECATED KAPPA LC FREE/LAMBDA SER: 0.87 RATIO
GAMMA GLOB FLD ELPH-MCNC: 0.7 G/DL
GAMMA GLOB MFR SERPL ELPH: 11.5 %
IGA SER QL IEP: 298 MG/DL
IGG SER QL IEP: 1283 MG/DL
IGM SER QL IEP: 51 MG/DL
INTERPRETATION SERPL IEP-IMP: NORMAL
KAPPA LC CSF-MCNC: 2.11 MG/DL
KAPPA LC SERPL-MCNC: 1.84 MG/DL
M PROTEIN SPEC IFE-MCNC: NORMAL
PROT SERPL-MCNC: 6.5 G/DL
PROT SERPL-MCNC: 6.5 G/DL

## 2021-11-09 ENCOUNTER — APPOINTMENT (OUTPATIENT)
Dept: UROLOGY | Facility: CLINIC | Age: 86
End: 2021-11-09
Payer: MEDICARE

## 2021-11-09 VITALS — HEART RATE: 67 BPM | DIASTOLIC BLOOD PRESSURE: 62 MMHG | SYSTOLIC BLOOD PRESSURE: 124 MMHG | TEMPERATURE: 97.6 F

## 2021-11-09 PROCEDURE — 99214 OFFICE O/P EST MOD 30 MIN: CPT

## 2021-11-09 NOTE — HISTORY OF PRESENT ILLNESS
[Currently Experiencing ___] :  [unfilled] [Urinary Urgency] : urinary urgency [Urinary Frequency] : urinary frequency [Nocturia] : nocturia [None] : None [FreeTextEntry1] : 89M with BPH s/p Urolift 3/22/2021\par Returns for follow up\par Currently on Alfuzosin 10 mg , Finasteride 5 mg,\par Improved symptoms \par Feels good. \par Denies dysuria, hematuria, flank pain

## 2021-11-09 NOTE — ASSESSMENT
[FreeTextEntry1] : 89M with BPH s/p Urolift 3/22/2021\par improved symptoms,\par mildly bothersome - comfortable \par Continue Alfuzosin 10 mg and, Finasteride 5 mg\par feels good\par \par Follow up in 6 months

## 2021-11-12 ENCOUNTER — APPOINTMENT (OUTPATIENT)
Dept: HEART AND VASCULAR | Facility: CLINIC | Age: 86
End: 2021-11-12
Payer: MEDICARE

## 2021-11-12 VITALS
RESPIRATION RATE: 16 BRPM | HEART RATE: 59 BPM | OXYGEN SATURATION: 97 % | SYSTOLIC BLOOD PRESSURE: 144 MMHG | WEIGHT: 141 LBS | BODY MASS INDEX: 22.13 KG/M2 | TEMPERATURE: 98.2 F | DIASTOLIC BLOOD PRESSURE: 62 MMHG | HEIGHT: 67 IN

## 2021-11-12 VITALS — SYSTOLIC BLOOD PRESSURE: 118 MMHG | DIASTOLIC BLOOD PRESSURE: 70 MMHG

## 2021-11-12 PROCEDURE — 99214 OFFICE O/P EST MOD 30 MIN: CPT

## 2021-11-12 PROCEDURE — 93000 ELECTROCARDIOGRAM COMPLETE: CPT

## 2021-11-14 NOTE — ASSESSMENT
[FreeTextEntry1] : An EKG was performed to evaluate for arrhythmia and ischemia. \par \par Palpitations--Ziopatch requested

## 2021-11-14 NOTE — HISTORY OF PRESENT ILLNESS
[FreeTextEntry1] : 89 year male who comes for Cardiology followup. He traveled to [facility] without any issues. He notes palpitations intermittently in the mornings. He denies having any chest pain, SOB, FISH, dizziness, orthopnea, PND or syncope

## 2021-12-09 ENCOUNTER — APPOINTMENT (OUTPATIENT)
Dept: VASCULAR SURGERY | Facility: CLINIC | Age: 86
End: 2021-12-09
Payer: MEDICARE

## 2021-12-09 VITALS
BODY MASS INDEX: 22.13 KG/M2 | HEIGHT: 67 IN | DIASTOLIC BLOOD PRESSURE: 63 MMHG | WEIGHT: 141 LBS | HEART RATE: 94 BPM | SYSTOLIC BLOOD PRESSURE: 131 MMHG

## 2021-12-09 PROCEDURE — 93880 EXTRACRANIAL BILAT STUDY: CPT

## 2021-12-09 PROCEDURE — 99213 OFFICE O/P EST LOW 20 MIN: CPT

## 2021-12-13 ENCOUNTER — APPOINTMENT (OUTPATIENT)
Dept: HEMATOLOGY ONCOLOGY | Facility: CLINIC | Age: 86
End: 2021-12-13
Payer: MEDICARE

## 2021-12-13 VITALS
WEIGHT: 136 LBS | HEIGHT: 67 IN | TEMPERATURE: 97.7 F | BODY MASS INDEX: 21.35 KG/M2 | SYSTOLIC BLOOD PRESSURE: 138 MMHG | OXYGEN SATURATION: 98 % | HEART RATE: 66 BPM | DIASTOLIC BLOOD PRESSURE: 58 MMHG

## 2021-12-13 PROCEDURE — 99214 OFFICE O/P EST MOD 30 MIN: CPT | Mod: 25

## 2021-12-13 PROCEDURE — 36415 COLL VENOUS BLD VENIPUNCTURE: CPT

## 2021-12-13 RX ORDER — DEXTROAMPHETAMINE SULFATE, DEXTROAMPHETAMINE SACCHARATE, AMPHETAMINE ASPARTATE MONOHYDRATE, AND AMPHETAMINE SULFATE 6.25; 6.25; 6.25; 6.25 MG/1; MG/1; MG/1; MG/1
25 CAPSULE, EXTENDED RELEASE ORAL
Refills: 0 | Status: DISCONTINUED | COMMUNITY
End: 2021-12-13

## 2021-12-13 RX ORDER — PREDNISONE 50 MG/1
50 TABLET ORAL
Refills: 0 | Status: DISCONTINUED | COMMUNITY
End: 2021-12-13

## 2021-12-13 RX ORDER — BACILLUS COAGULANS/INULIN 1B-250 MG
CAPSULE ORAL
Refills: 0 | Status: DISCONTINUED | COMMUNITY
End: 2021-12-13

## 2021-12-13 NOTE — ASSESSMENT
[FreeTextEntry1] : 89 yoM w/h/o CLL, PUD, gout, CAD, s/p PTCA x 2 (last in 2010 ), colon polyps hx recurrent PE/bilateral DVT (eliquis), presenting to office for carotid FU. Doing well, no issues. Still wearing compression stockings. \par \par Carotid US done today and has remained stable, FU here in 6 months or sooner with any issues.

## 2021-12-13 NOTE — PHYSICAL EXAM
[Normal Breath Sounds] : Normal breath sounds [Normal Heart Sounds] : normal heart sounds [2+] : left 2+ [Ankle Swelling Bilaterally] : bilaterally  [Ankle Swelling On The Right] : mild [No Rash or Lesion] : No rash or lesion [Alert] : alert [Calm] : calm [JVD] : no jugular venous distention  [Carotid Bruits] : no carotid bruits [Ankle Swelling (On Exam)] : not present [Varicose Veins Of Lower Extremities] : not present [] : not present [Abdomen Tenderness] : ~T ~M No abdominal tenderness [de-identified] : WN/WD, NAD [de-identified] : NC/AT [de-identified] : FROM throughout, strength 5/5x4 [de-identified] : CNII-XII/HEYDI grossly intact

## 2021-12-13 NOTE — PROCEDURE
[FreeTextEntry1] : Carotid duplex done today to evaluate carotid stenosis shows left 282/84, right 142/16

## 2021-12-13 NOTE — REVIEW OF SYSTEMS
[As noted in HPI] : as noted in HPI [Lower Ext Edema] : lower extremity edema [As Noted in HPI] : as noted in HPI [Arthralgias] : arthralgias [Limb Pain] : limb pain [Limb Swelling] : limb swelling [Negative] : Heme/Lymph [Leg Claudication] : no intermittent leg claudication

## 2021-12-13 NOTE — ASSESSMENT
[FreeTextEntry1] :  Patient with CLL, developed PE post his urological procedure... Was placed on Eliquis, and developed severe hematuria...\par \par Patient returns to the office today is no complaints...\par \par Repeat blood work done

## 2021-12-13 NOTE — END OF VISIT
[FreeTextEntry3] : I, Dr. Atul Chairez have read and attest that all the information, medical decision making and discharge instructions within are true and accurate. I personally performed the evaluation and management (E/M) services for this established patient who presents today with (a) new problem(s)/exacerbation of (an) existing condition(s).  That E/M includes conducting the examination, assessing all new/exacerbated conditions, and establishing a new plan of care.  Today, my ACP, Orin Gunter PA-C, was here to observe my evaluation and management services for this new problem/exacerbated condition to be followed going forward.\par

## 2021-12-13 NOTE — HISTORY OF PRESENT ILLNESS
[FreeTextEntry1] : 89 yoM w/h/o CLL, PUD, gout, CAD, s/p PTCA x 2 (last in 2010 ), colon polyps hx recurrent PE/bilateral DVT (eliquis), presenting to office for carotid FU. Doing well, no issues. Still wearing compression stockings.

## 2021-12-13 NOTE — HISTORY OF PRESENT ILLNESS
[de-identified] : Patient with CLL here for follow up  [de-identified] : He is tolerating Eliquis well without any bruising or bleeding. CBC is stable, Hb and platelet is stable. 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 He is scheduled to see Dr. Muniz from urology tomorrow. \par \par He is on iron tablets, tolerating well. \par \par Received IV Iron x2...\par \par 11-5-2019 getting pains in his feet when he takes the support stockings off...\par \par May 19, 2020 patient requested a follow-up appointment since he had blood work done last week and he wanted to discuss his results\par \par 1/28/2020 pt returns for f/u...seeing multiple docs...[facility] Sim[facility], even so his total cholesterol is 164 by their results (which are scanned in...) was also told to go back on Aspirin, but pt has low plt, and had 2 bleeding ulcers...\par \par August 10, 2020 returning for follow-up has no complaints... Unable to visit [facility] this summer because of the pandemic... Keeping social distance for most part\par \par August 18, 2020 patient was found to have worsening thrombocytopenia... I do not believe the thrombocytopenia is secondary to his CLL... Therefore went very carefully over his medication list..\par \par January 21, 2021 patient requested a follow-up appointment to discuss his urinary symptoms... He was seen by urology and he was proposed procedure to improve his nocturia... At this point patient has nocturia x4-5 times and is unable to get a good night sleep...\par \par \par February 11,2021...had repeat blood work yesterday...plt low...\par \par March 13, 2021 patient returns to the office stating "total collapse of the systems"... Unable to eat, short of breath, constipated...\par \par July 14, 2021 returns to the office is doing well... After his recent admission to [facility] for GI bleed, he was readmitted to the [facility] where he was diagnosed with pseudogout, and was placed on prednisone with good relief of his left leg pain\par \par September 22, 2021 patient with CLL returns for follow-up several days before going to [facility] for 3 weeks...\par \par December 13,2021 returns for follow-up... Did not enjoy his last trip to [facility] since he did not have his own accommodations...

## 2021-12-14 DIAGNOSIS — D64.9 ANEMIA, UNSPECIFIED: ICD-10-CM

## 2021-12-17 ENCOUNTER — APPOINTMENT (OUTPATIENT)
Dept: HEART AND VASCULAR | Facility: CLINIC | Age: 86
End: 2021-12-17
Payer: MEDICARE

## 2021-12-17 ENCOUNTER — NON-APPOINTMENT (OUTPATIENT)
Age: 86
End: 2021-12-17

## 2021-12-17 VITALS
HEART RATE: 69 BPM | WEIGHT: 140 LBS | SYSTOLIC BLOOD PRESSURE: 124 MMHG | RESPIRATION RATE: 16 BRPM | OXYGEN SATURATION: 98 % | BODY MASS INDEX: 21.97 KG/M2 | HEIGHT: 67 IN | DIASTOLIC BLOOD PRESSURE: 44 MMHG | TEMPERATURE: 98.2 F

## 2021-12-17 DIAGNOSIS — I35.0 NONRHEUMATIC AORTIC (VALVE) STENOSIS: ICD-10-CM

## 2021-12-17 DIAGNOSIS — I34.0 NONRHEUMATIC MITRAL (VALVE) INSUFFICIENCY: ICD-10-CM

## 2021-12-17 PROCEDURE — 93306 TTE W/DOPPLER COMPLETE: CPT

## 2021-12-17 PROCEDURE — 99214 OFFICE O/P EST MOD 30 MIN: CPT

## 2021-12-17 NOTE — HISTORY OF PRESENT ILLNESS
[FreeTextEntry1] : 89 year male who returned from [facility]. He denies having any chest pain, SOB, FISH, dizziness, palpitations, orthopnea, PND or syncope. His Ziopatch results were not available at the time of his visit

## 2021-12-17 NOTE — ASSESSMENT
[FreeTextEntry1] : At the time of the patient's visit an Echocardiogram was performed to evaluate LV function. At the time of the visit the results were reviewed with patient \par \par CAD-- Followup Ziopatch. Continue current medications

## 2021-12-21 LAB
ALBUMIN SERPL ELPH-MCNC: 4.2 G/DL
ALP BLD-CCNC: 85 U/L
ALT SERPL-CCNC: 10 U/L
ANION GAP SERPL CALC-SCNC: 12 MMOL/L
AST SERPL-CCNC: 15 U/L
BASOPHILS # BLD AUTO: 0.03 K/UL
BASOPHILS NFR BLD AUTO: 0.5 %
BILIRUB SERPL-MCNC: 0.5 MG/DL
BUN SERPL-MCNC: 41 MG/DL
CALCIUM SERPL-MCNC: 9.1 MG/DL
CHLORIDE SERPL-SCNC: 108 MMOL/L
CO2 SERPL-SCNC: 22 MMOL/L
CREAT SERPL-MCNC: 1.67 MG/DL
EOSINOPHIL # BLD AUTO: 0.16 K/UL
EOSINOPHIL NFR BLD AUTO: 2.6 %
ERYTHROCYTE [SEDIMENTATION RATE] IN BLOOD BY WESTERGREN METHOD: 8 MM/HR
GLUCOSE SERPL-MCNC: 91 MG/DL
HCT VFR BLD CALC: 28.7 %
HGB BLD-MCNC: 9 G/DL
IMM GRANULOCYTES NFR BLD AUTO: 0.2 %
LDH SERPL-CCNC: 314 U/L
LYMPHOCYTES # BLD AUTO: 4.28 K/UL
LYMPHOCYTES NFR BLD AUTO: 69.8 %
MAN DIFF?: NORMAL
MCHC RBC-ENTMCNC: 31.4 GM/DL
MCHC RBC-ENTMCNC: 31.7 PG
MCV RBC AUTO: 101.1 FL
MONOCYTES # BLD AUTO: 0.26 K/UL
MONOCYTES NFR BLD AUTO: 4.2 %
NEUTROPHILS # BLD AUTO: 1.39 K/UL
NEUTROPHILS NFR BLD AUTO: 22.7 %
PLATELET # BLD AUTO: 79 K/UL
POTASSIUM SERPL-SCNC: 4.4 MMOL/L
PROT SERPL-MCNC: 6 G/DL
RBC # BLD: 2.84 M/UL
RBC # FLD: 14.9 %
SODIUM SERPL-SCNC: 142 MMOL/L
WBC # FLD AUTO: 6.13 K/UL

## 2022-01-01 NOTE — PATIENT PROFILE ADULT - HOME ACCESSIBILITY CONCERNS
Patient's mom Morro is wondering if it's okay for the patient get her her shots at 6 weeks instead of the 8 weeks.    Okay to leave a detailed message.     none

## 2022-02-10 ENCOUNTER — NON-APPOINTMENT (OUTPATIENT)
Age: 87
End: 2022-02-10

## 2022-02-16 ENCOUNTER — NON-APPOINTMENT (OUTPATIENT)
Age: 87
End: 2022-02-16

## 2022-02-23 ENCOUNTER — OUTPATIENT (OUTPATIENT)
Dept: OUTPATIENT SERVICES | Facility: HOSPITAL | Age: 87
LOS: 1 days | End: 2022-02-23
Payer: MEDICARE

## 2022-02-23 ENCOUNTER — APPOINTMENT (OUTPATIENT)
Dept: ORTHOPEDIC SURGERY | Facility: CLINIC | Age: 87
End: 2022-02-23
Payer: MEDICARE

## 2022-02-23 ENCOUNTER — RESULT REVIEW (OUTPATIENT)
Age: 87
End: 2022-02-23

## 2022-02-23 VITALS
HEIGHT: 67 IN | SYSTOLIC BLOOD PRESSURE: 151 MMHG | HEART RATE: 67 BPM | WEIGHT: 140 LBS | DIASTOLIC BLOOD PRESSURE: 67 MMHG | BODY MASS INDEX: 21.97 KG/M2 | OXYGEN SATURATION: 95 %

## 2022-02-23 DIAGNOSIS — Z98.890 OTHER SPECIFIED POSTPROCEDURAL STATES: Chronic | ICD-10-CM

## 2022-02-23 DIAGNOSIS — Z90.49 ACQUIRED ABSENCE OF OTHER SPECIFIED PARTS OF DIGESTIVE TRACT: Chronic | ICD-10-CM

## 2022-02-23 PROCEDURE — 73564 X-RAY EXAM KNEE 4 OR MORE: CPT

## 2022-02-23 PROCEDURE — 99214 OFFICE O/P EST MOD 30 MIN: CPT

## 2022-02-23 PROCEDURE — 73564 X-RAY EXAM KNEE 4 OR MORE: CPT | Mod: 26,50

## 2022-02-27 NOTE — HISTORY OF PRESENT ILLNESS
[de-identified] : 2/23/22: Reports that the left hip pain has progressed since last visit. Remains able to perform ADLs and tasks outside the house, but with more pain. He is not interested in any surgical intervention.\par \par 9/21/21: 89y/o male presenting for evaluation of L > R knee and LLE pain. Symptoms arose relatively suddenly while admitted at [facility] for a separate issue (acute RLE/RUE DVT and PE) in April 2021. Symptoms have gradually resolved on their own and he is currently only having minimal left hip and thigh pain. Ambulates with a rollator and can make it about 6 blocks. He is stopped more by cardiovascular fatigue than leg pain. At the time of the pain flare, someone diagnosed him with pseudogout though he does not believe any joint aspiration was ever performed. He denies any joint swelling, redness, or warmth at any time.

## 2022-02-27 NOTE — PHYSICAL EXAM
[de-identified] : General appearance: thin body habitus, pleasant, alert and oriented x 3, cooperative. \par HEENT: normocephalic, EOM intact, wearing mask, external auditory canal clear. \par Cardiovascular: no lower leg edema, no varicosities, dorsalis pedis pulses palpable and symmetric. \par Lymphatics: no palpable lymphadenopathy, no lymphedema. \par Neurologic: sensation is normal, no muscle weakness in upper or lower extremities, patella tendon reflexes present and symmetric. \par Dermatologic: skin moist, warm, no rash. \par Spine: cervical spine with normal lordosis and painless range of motion, thoracic spine with moderate kyphosis and painless range of motion, lumbosacral spine with normal lordosis and painless range of motion.  No tenderness to palpation along midline spine and paraspinal musculature.  Sacroiliac joint mildly tender bilaterally. Negative SLR and crossed SLR tests bilaterally.\par Gait: spinal gait noted \par  \par Limb lengths: Shortened left leg compared to the right \par  \par Left hip:\par - Swelling:\par - Ecchymosis:\par - Erythema:\par - Wounds:\par - Tenderness:\par - ROM: 120 flexion, 0 extension, 10 adduction, 20 abduction, 10 internal rotation, 15 external rotation\par - BONIFACIO:\par - FADIR:\par - Concetta:\par - Stinchfield:\par - Flexor power: 4/5\par - Abductor power: 4/5\par  \par Right hip:\par - Swelling:\par - Ecchymosis:\par - Erythema:\par - Wounds:\par - Tenderness:\par - ROM: 120 flexion, 0 extension, 20 adduction, 30 abduction, 0 internal rotation, 65 external rotation\par - BONIFACIO:\par - FADIR:\par - Concetta:\par - Stinchfield:\par - Flexor power: 4/5\par - Abductor power: 4/5\par  \par Left knee: all fields negative/normal/unremarkable unless otherwise noted --\par - Focal soft tissue swelling: \par - Ecchymosis: \par - Erythema: \par - Effusion: \par - Wounds: \par - Alignment: \par - Tenderness: \par - ROM: 0-120\par - Collateral laxity: Stable\par - Cruciate laxity: Stable\par - Meniscal signs: negative Mayo and Goyo.  \par - Popliteal angle (degrees): 60 degrees\par - Quad strength: \par \par Right knee: all fields negative/normal/unremarkable unless otherwise noted --\par - Focal soft tissue swelling: \par - Ecchymosis: \par - Erythema: \par - Effusion: \par - Wounds: \par - Alignment: \par - Tenderness: \par - ROM: 0-120\par - Collateral laxity: Stable\par - Cruciate laxity: Stable\par - Meniscal signs: negative Mayo and Goyo.  \par - Popliteal angle (degrees): 80\par - Quad strength: \par \par  [de-identified] : 4 views of the bilateral knees (weightbearing AP, weightbearing Roa, weightbearing lateral, and Sunrise) were interpreted by me and reviewed with the patient.\par \par Date of exam: 2/23/22\par \par Bilateral knees show diminished medial and lateral joint space bilaterally likely consistent with age-appropriate change, KL3. No significant sclerosis or cystic changes noted, patellas normal height, no evidence of fracture or dislocation.

## 2022-02-27 NOTE — DISCUSSION/SUMMARY
[de-identified] : 89y/o male with left hip osteonecrosis and osteoarthritis, bilateral knee osteoarthritis\par - We discussed that MAGI would be the definitive management for his left hip OA/ON. He is not willing to consider it at this time.\par - PT/HEP\par - Tylenol for pain\par - Follow up with me q6mo with repeat left hip XRs or earlier as needed. While I acknowledge that he is a high risk candidate for surgery, I advised him to reconsider MAGI if he feels that the pain is becoming unbearable and/or if he feels he is at risk for losing his basic mobility and independence.

## 2022-03-15 ENCOUNTER — NON-APPOINTMENT (OUTPATIENT)
Age: 87
End: 2022-03-15

## 2022-03-15 ENCOUNTER — APPOINTMENT (OUTPATIENT)
Dept: VASCULAR SURGERY | Facility: CLINIC | Age: 87
End: 2022-03-15
Payer: MEDICARE

## 2022-03-15 VITALS
SYSTOLIC BLOOD PRESSURE: 135 MMHG | HEART RATE: 65 BPM | HEIGHT: 67 IN | DIASTOLIC BLOOD PRESSURE: 65 MMHG | WEIGHT: 140 LBS | BODY MASS INDEX: 21.97 KG/M2

## 2022-03-15 PROCEDURE — 93880 EXTRACRANIAL BILAT STUDY: CPT

## 2022-03-15 PROCEDURE — 99213 OFFICE O/P EST LOW 20 MIN: CPT

## 2022-03-15 NOTE — ADDENDUM
[FreeTextEntry1] : I, Dr.Khalil Chairez, personally performed the evaluation and management (E/M) services for this established patient who presents today with (an) existing condition(s).  That E/M includes conducting the examination, assessing all conditions, and (re)establishing/reinforcing a plan of care.  Today, my ACP, Vicky PARSON, was here to observe my evaluation and management services for this condition to be followed going forward.\par \par \par

## 2022-03-15 NOTE — PROCEDURE
[FreeTextEntry1] : Carotid duplex done today to evaluate carotid stenosis shows left >70% (287/72, previously 282/84), right 50-69%

## 2022-03-15 NOTE — PHYSICAL EXAM
[Normal Breath Sounds] : Normal breath sounds [Normal Heart Sounds] : normal heart sounds [2+] : left 2+ [Ankle Swelling Bilaterally] : bilaterally  [Ankle Swelling On The Right] : mild [No Rash or Lesion] : No rash or lesion [Alert] : alert [Calm] : calm [JVD] : no jugular venous distention  [Carotid Bruits] : no carotid bruits [Ankle Swelling (On Exam)] : not present [Varicose Veins Of Lower Extremities] : not present [] : not present [Abdomen Tenderness] : ~T ~M No abdominal tenderness [de-identified] : WN/WD, NAD [de-identified] : NC/AT [de-identified] : FROM throughout, strength 5/5x4 [de-identified] : CNII-XII/HEYDI grossly intact

## 2022-03-15 NOTE — ASSESSMENT
[FreeTextEntry1] : 89 yo M w/h/o CLL, PUD, gout, CAD, s/p PTCA x 2 (last in 2010 ), colon polyps hx recurrent PE/bilateral DVT on Eliquis, presenting to office for carotid artery disease surveillance. Doing well, denies neurologic deficits or symptoms.\par Carotid US done today and has remained stable, FU here in 3 months or sooner with any issues.  [Carotid Endarterectomy] : carotid endarterectomy

## 2022-03-15 NOTE — HISTORY OF PRESENT ILLNESS
[FreeTextEntry1] : 89 yo M w/h/o CLL, PUD, gout, CAD, s/p PTCA x 2 (last in 2010 ), colon polyps hx recurrent PE/bilateral DVT on Eliquis, presenting to office for carotid artery disease surveillance. Doing well, denies neurologic deficits or symptoms.

## 2022-03-21 ENCOUNTER — APPOINTMENT (OUTPATIENT)
Dept: HEMATOLOGY ONCOLOGY | Facility: CLINIC | Age: 87
End: 2022-03-21
Payer: MEDICARE

## 2022-03-21 VITALS
RESPIRATION RATE: 15 BRPM | HEIGHT: 67 IN | HEART RATE: 59 BPM | WEIGHT: 138 LBS | DIASTOLIC BLOOD PRESSURE: 60 MMHG | BODY MASS INDEX: 21.66 KG/M2 | OXYGEN SATURATION: 98 % | SYSTOLIC BLOOD PRESSURE: 110 MMHG | TEMPERATURE: 96.7 F

## 2022-03-21 PROCEDURE — 36415 COLL VENOUS BLD VENIPUNCTURE: CPT

## 2022-03-21 PROCEDURE — 99214 OFFICE O/P EST MOD 30 MIN: CPT | Mod: 25

## 2022-03-21 RX ORDER — LORATADINE 5 MG/5 ML
10 SOLUTION, ORAL ORAL
Refills: 0 | Status: ACTIVE | COMMUNITY

## 2022-03-21 NOTE — HISTORY OF PRESENT ILLNESS
[de-identified] : Patient with CLL here for follow up  [de-identified] : He is tolerating Eliquis well without any bruising or bleeding. CBC is stable, Hb and platelet is stable. 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 He is scheduled to see Dr. Muniz from urology tomorrow. \par \par He is on iron tablets, tolerating well. \par \par Received IV Iron x2...\par \par 11-5-2019 getting pains in his feet when he takes the support stockings off...\par \par May 19, 2020 patient requested a follow-up appointment since he had blood work done last week and he wanted to discuss his results\par \par 1/28/2020 pt returns for f/u...seeing multiple docs...[facility], even so his total cholesterol is 164 by their results (which are scanned in...) was also told to go back on Aspirin, but pt has low plt, and had 2 bleeding ulcers...\par \par August 10, 2020 returning for follow-up has no complaints... Unable to visit [facility] this summer because of the pandemic... Keeping social distance for most part\par \par August 18, 2020 patient was found to have worsening thrombocytopenia... I do not believe the thrombocytopenia is secondary to his CLL... Therefore went very carefully over his medication list..\par \par January 21, 2021 patient requested a follow-up appointment to discuss his urinary symptoms... He was seen by urology and he was proposed procedure to improve his nocturia... At this point patient has nocturia x4-5 times and is unable to get a good night sleep...\par \par \par February 11,2021...had repeat blood work yesterday...plt low...\par \par March 13, 2021 patient returns to the office stating "total collapse of the systems"... Unable to eat, short of breath, constipated...\par \par July 14, 2021 returns to the office is doing well... After his recent admission to [facility] for GI bleed, he was read[facility] to [facility] where he was diagnosed with pseudogout, and was placed on prednisone with good relief of his left leg pain\par \par September 22, 2021 patient with CLL returns for follow-up several days before going to [facility] for 3 weeks...\par \par December 13,2021 returns for follow-up... Did not enjoy his last trip to [facility] since he did not have his own accommodations...\par \par March 21, 2022 returns for follow-up no complaints... His arthritic pain is controlled with Tylenol arthritis as opposed to regular Tylenol

## 2022-03-28 ENCOUNTER — APPOINTMENT (OUTPATIENT)
Dept: INTERNAL MEDICINE | Facility: CLINIC | Age: 87
End: 2022-03-28
Payer: MEDICARE

## 2022-03-28 VITALS
BODY MASS INDEX: 21.66 KG/M2 | DIASTOLIC BLOOD PRESSURE: 60 MMHG | RESPIRATION RATE: 16 BRPM | HEART RATE: 60 BPM | OXYGEN SATURATION: 98 % | SYSTOLIC BLOOD PRESSURE: 116 MMHG | HEIGHT: 67 IN | TEMPERATURE: 98.2 F | WEIGHT: 138 LBS

## 2022-03-28 DIAGNOSIS — Z00.00 ENCOUNTER FOR GENERAL ADULT MEDICAL EXAMINATION W/OUT ABNORMAL FINDINGS: ICD-10-CM

## 2022-03-28 PROCEDURE — 99397 PER PM REEVAL EST PAT 65+ YR: CPT

## 2022-03-28 PROCEDURE — 36415 COLL VENOUS BLD VENIPUNCTURE: CPT

## 2022-03-28 NOTE — REVIEW OF SYSTEMS
[Dysuria] : no dysuria [Hematuria] : no hematuria [Frequency] : frequency [Joint Pain] : joint pain [Back Pain] : back pain [Negative] : Psychiatric

## 2022-03-28 NOTE — PHYSICAL EXAM
[Normal Rate] : normal rate  [Regular Rhythm] : with a regular rhythm [No Edema] : there was no peripheral edema [Normal] : affect was normal and insight and judgment were intact [de-identified] : left knee- no swelling, no erythema

## 2022-03-28 NOTE — HISTORY OF PRESENT ILLNESS
[FreeTextEntry1] : wellness [de-identified] : 89 yo M w/ hx CLL, DVT, PUD ,CKD 3,  gout, CAD, s/p 2 stents(last in 2010 ),colon polyps, PE after Urolift procedure\par -Left knee and hip continues to bother him.\par -remains on eliquis- [facility]>\par  \par \par

## 2022-03-28 NOTE — PLAN
[FreeTextEntry1] : Hip ppain and knee pain\par - tylenol bid dosing\par - f/up PRN with Dr Baltazar\par \par PE to continue on Eliquis\par \par HTN- controlled\par \par CKD 3 -check complete labs now.

## 2022-03-29 LAB
25(OH)D3 SERPL-MCNC: 71.5 NG/ML
ALBUMIN SERPL ELPH-MCNC: 4.6 G/DL
ALP BLD-CCNC: 69 U/L
ALT SERPL-CCNC: 5 U/L
ANION GAP SERPL CALC-SCNC: 12 MMOL/L
APPEARANCE: CLEAR
AST SERPL-CCNC: 13 U/L
BACTERIA: NEGATIVE
BASOPHILS # BLD AUTO: 0.04 K/UL
BASOPHILS NFR BLD AUTO: 0.5 %
BILIRUB SERPL-MCNC: 0.3 MG/DL
BILIRUBIN URINE: NEGATIVE
BLOOD URINE: NORMAL
BUN SERPL-MCNC: 34 MG/DL
CALCIUM SERPL-MCNC: 9.2 MG/DL
CALCIUM SERPL-MCNC: 9.2 MG/DL
CHLORIDE SERPL-SCNC: 111 MMOL/L
CHOLEST SERPL-MCNC: 171 MG/DL
CO2 SERPL-SCNC: 21 MMOL/L
COLOR: YELLOW
CREAT SERPL-MCNC: 1.58 MG/DL
CREAT SPEC-SCNC: 109 MG/DL
CREAT/PROT UR: 0.1 RATIO
DIRECT COOMBS: NORMAL
EGFR: 41 ML/MIN/1.73M2
EOSINOPHIL # BLD AUTO: 0.1 K/UL
EOSINOPHIL NFR BLD AUTO: 1.3 %
ERYTHROCYTE [SEDIMENTATION RATE] IN BLOOD BY WESTERGREN METHOD: 9 MM/HR
ESTIMATED AVERAGE GLUCOSE: 105 MG/DL
FERRITIN SERPL-MCNC: 390 NG/ML
GLUCOSE QUALITATIVE U: NEGATIVE
GLUCOSE SERPL-MCNC: 101 MG/DL
HAPTOGLOB SERPL-MCNC: 38 MG/DL
HBA1C MFR BLD HPLC: 5.3 %
HCT VFR BLD CALC: 32 %
HDLC SERPL-MCNC: 36 MG/DL
HGB BLD-MCNC: 10.2 G/DL
HYALINE CASTS: 1 /LPF
IMM GRANULOCYTES NFR BLD AUTO: 0.1 %
IRON SATN MFR SERPL: 20 %
IRON SERPL-MCNC: 51 UG/DL
KETONES URINE: NEGATIVE
LDH SERPL-CCNC: 233 U/L
LDLC SERPL CALC-MCNC: 106 MG/DL
LEUKOCYTE ESTERASE URINE: NEGATIVE
LYMPHOCYTES # BLD AUTO: 5.68 K/UL
LYMPHOCYTES NFR BLD AUTO: 74.3 %
MAGNESIUM SERPL-MCNC: 1.8 MG/DL
MAN DIFF?: NORMAL
MCHC RBC-ENTMCNC: 31.9 GM/DL
MCHC RBC-ENTMCNC: 32 PG
MCV RBC AUTO: 100.3 FL
MICROSCOPIC-UA: NORMAL
MONOCYTES # BLD AUTO: 0.3 K/UL
MONOCYTES NFR BLD AUTO: 3.9 %
NEUTROPHILS # BLD AUTO: 1.51 K/UL
NEUTROPHILS NFR BLD AUTO: 19.9 %
NITRITE URINE: NEGATIVE
NONHDLC SERPL-MCNC: 135 MG/DL
PARATHYROID HORMONE INTACT: 46 PG/ML
PH URINE: 5
PHOSPHATE SERPL-MCNC: 3.4 MG/DL
PLATELET # BLD AUTO: 80 K/UL
POTASSIUM SERPL-SCNC: 4.5 MMOL/L
PROT SERPL-MCNC: 6.4 G/DL
PROT UR-MCNC: 12 MG/DL
PROTEIN URINE: NEGATIVE
RBC # BLD: 3.19 M/UL
RBC # FLD: 14.8 %
RED BLOOD CELLS URINE: 2 /HPF
SODIUM SERPL-SCNC: 144 MMOL/L
SPECIFIC GRAVITY URINE: 1.02
SQUAMOUS EPITHELIAL CELLS: 0 /HPF
TIBC SERPL-MCNC: 253 UG/DL
TRIGL SERPL-MCNC: 144 MG/DL
UIBC SERPL-MCNC: 202 UG/DL
URATE SERPL-MCNC: 8.7 MG/DL
UROBILINOGEN URINE: NORMAL
WBC # FLD AUTO: 7.64 K/UL
WHITE BLOOD CELLS URINE: 0 /HPF

## 2022-04-11 PROBLEM — Z11.59 SCREENING FOR VIRAL DISEASE: Status: ACTIVE | Noted: 2020-08-10

## 2022-04-13 ENCOUNTER — APPOINTMENT (OUTPATIENT)
Dept: HEART AND VASCULAR | Facility: CLINIC | Age: 87
End: 2022-04-13
Payer: MEDICARE

## 2022-04-13 VITALS
WEIGHT: 140 LBS | HEIGHT: 67 IN | TEMPERATURE: 98 F | SYSTOLIC BLOOD PRESSURE: 114 MMHG | DIASTOLIC BLOOD PRESSURE: 58 MMHG | HEART RATE: 40 BPM | OXYGEN SATURATION: 97 % | BODY MASS INDEX: 21.97 KG/M2 | RESPIRATION RATE: 16 BRPM

## 2022-04-13 PROCEDURE — 93000 ELECTROCARDIOGRAM COMPLETE: CPT

## 2022-04-13 PROCEDURE — 99214 OFFICE O/P EST MOD 30 MIN: CPT

## 2022-04-13 NOTE — HISTORY OF PRESENT ILLNESS
[FreeTextEntry1] : 89 year male who comes for Cardiology followup. He denies having any chest pain, SOB, FISH, dizziness, palpitations, orthopnea, PND, near syncope or syncope

## 2022-04-13 NOTE — ASSESSMENT
[FreeTextEntry1] : An EKG was performed to evaluate for arrhythmia and ischemia. \par \par NSVT, asymptomatic bradycardia-- Arrangements were made for EP consultation with Dr Ray for May 11 at 2:15PM. We discussed possible future PPM or medication adjustment as well as possible ILR. He will call if any symptoms of dizziness

## 2022-04-13 NOTE — PHYSICAL EXAM
[Normal Conjunctiva] : normal conjunctiva [No Edema] : no edema [Moves all extremities] : moves all extremities [Normal] : alert and oriented, normal memory [de-identified] : using wheeling walker

## 2022-05-05 ENCOUNTER — RX RENEWAL (OUTPATIENT)
Age: 87
End: 2022-05-05

## 2022-05-10 NOTE — REASON FOR VISIT
05/10/22        Patient: Vernon Marc   YOB: 1952   Date of Visit: 5/10/2022       Dear  Dr. Stephanie Chavez MD,      Thank you for referring Vernon Marc to my practice. Please find my assessment and plan below. As you know she is a 66-year-old -American female with a history of adult onset diabetes mellitus, hypertension, coronary artery disease who I now had the pleasure of seeing for follow-up of chronic kidney disease stage III. The patient underwent a recent renal evaluation. Of note is that a sed rate, connective tissue profile, and random urine for Bence-Sotelo protein was nonrevealing. Her urine microalbumin to creatinine ratio was only mildly elevated at 217 and a renal ultrasound showed a simple right renal cyst but otherwise was unremarkable. I therefore informed the patient and her daughter that she does have chronic kidney disease stage III. This is most likely secondary to hypertension and/or diabetic nephropathy. Repeat creatinine done on April 20, 2022 was a bit higher at 1.55 with an estimated GFR 39 cc/min. Electrolytes were good and hemoglobin 11.5. I therefore reinforced importance of maintaining adequate hydration. Avoid any use of nonsteroidals. Keep blood pressure and blood sugars under good control. She has just recently started cardiac rehab. Will have her repeat a CBC and renal panel in 1 month to ensure stability. If stable will continue quarterly. I will see her again in 6 months for follow-up or sooner if clinically indicated. Thank you again for allowing me to participate in the care of your patient. If you have any questions please feel free to call.            Sincerely,   Madalyn Cuellar MD   [address]  [place] [address] [place] [address] [id number] [facility] [address] [id number]    Document electronically generated by:  Madalyn Cuellar MD
[Follow-up Visit ___] : a follow-up visit  for [unfilled]

## 2022-05-11 ENCOUNTER — NON-APPOINTMENT (OUTPATIENT)
Age: 87
End: 2022-05-11

## 2022-05-11 ENCOUNTER — APPOINTMENT (OUTPATIENT)
Dept: HEART AND VASCULAR | Facility: CLINIC | Age: 87
End: 2022-05-11
Payer: MEDICARE

## 2022-05-11 VITALS
HEIGHT: 67 IN | WEIGHT: 136 LBS | SYSTOLIC BLOOD PRESSURE: 130 MMHG | TEMPERATURE: 97.8 F | DIASTOLIC BLOOD PRESSURE: 60 MMHG | HEART RATE: 68 BPM | BODY MASS INDEX: 21.35 KG/M2

## 2022-05-11 PROCEDURE — 99204 OFFICE O/P NEW MOD 45 MIN: CPT

## 2022-05-11 PROCEDURE — 93000 ELECTROCARDIOGRAM COMPLETE: CPT

## 2022-05-12 NOTE — PHYSICAL EXAM
[Normal Conjunctiva] : normal conjunctiva [No Edema] : no edema [Moves all extremities] : moves all extremities [Normal] : alert and oriented, normal memory [Well Developed] : well developed [Well Nourished] : well nourished [No Acute Distress] : no acute distress [Normal Venous Pressure] : normal venous pressure [No Carotid Bruit] : no carotid bruit [Normal S1, S2] : normal S1, S2 [No Murmur] : no murmur [No Rub] : no rub [No Gallop] : no gallop [Good Air Entry] : good air entry [Clear Lung Fields] : clear lung fields [No Respiratory Distress] : no respiratory distress  [Soft] : abdomen soft [Non Tender] : non-tender [No Masses/organomegaly] : no masses/organomegaly [Normal Bowel Sounds] : normal bowel sounds [Normal Gait] : normal gait [No Cyanosis] : no cyanosis [No Clubbing] : no clubbing [No Varicosities] : no varicosities [No Rash] : no rash [No Skin Lesions] : no skin lesions [No Focal Deficits] : no focal deficits [Normal Speech] : normal speech [Alert and Oriented] : alert and oriented [Normal memory] : normal memory [de-identified] : using wheeling walker

## 2022-05-12 NOTE — ADDENDUM
[FreeTextEntry1] : I, Raghu Nguyen, hereby attest that the medical record entry for this patient accurately reflects signatures/notations that I made on the Date of Service in my capacity as an Attending Physician when I treated/diagnosed the above patient. I do hereby attest that this information is true, accurate and complete to the best of my knowledge.  I was present for the entire visit and supervised the entire visit and made/agree with the plan as outlined.

## 2022-05-12 NOTE — HISTORY OF PRESENT ILLNESS
[FreeTextEntry1] : 89 yoM w/h/o CLL, PUD, gout, CAD, s/p PTCA x 2 (last in 2010 ), colon polyps hx recurrent PE/bilateral DVT  presenting to office for initial evaluation for sinus bradycardia. \par \par Patient referred by Dr. Hodge, recently patient wore an event monitor for 2 weeks showing sinus bradycardia and small run of NSVT. \par \par He denies having any chest pain, SOB, FISH, dizziness, orthopnea, PND, near syncope or syncope. Reports palpitations a few times a day but overall not bothersome. Reports his last fall was over 9 months ago not due to dizziness or lightheadedness. No limitations to activities of daily activities. Has home health aid 4x for 4 hours. Lives alone at home has life alert. He travels to [facility] annually. \par \par Maintained on eliquis and metoprolol.

## 2022-05-12 NOTE — DISCUSSION/SUMMARY
[FreeTextEntry1] : 89 yoM w/h/o CLL, PUD, gout, CAD, s/p PTCA x 2 (last in 2010 ), colon polyps hx recurrent PE/bilateral DVT  presenting to office for initial evaluation for sinus bradycardia. \par \par We discussed the natural conduction system of the heart and how sinus node dysfunction can cause symptomatic bradycardia that can reduce the quality of life- patient endorses no SOB, FISH or reduced energy level. \par \par Patient overall reports he feels well, endorses some palpitations but does not feel he is limited in his ability to complete activities of daily living. He has home health aid 4 days per week for 4 hours but is still larely independent and does not feel reduced capacity. Sinus bradycardia and short runs of NSVT noted on holter monitor. Heart rate variability (50-90bpm) noted during morning to early afternoon hours when patient is most active. \par \par At the present, no strong indication for PPM based on patient reported symptoms. Patient with continue to follow Dr. Hodge for routine monitors and clinical evaluation. If patient symptoms worsen or functional capacity reduces will consider proceeding with PPM. Patient knows to call with any questions or concerns.

## 2022-06-17 ENCOUNTER — APPOINTMENT (OUTPATIENT)
Dept: VASCULAR SURGERY | Facility: CLINIC | Age: 87
End: 2022-06-17
Payer: MEDICARE

## 2022-06-17 VITALS
WEIGHT: 136 LBS | HEIGHT: 67 IN | SYSTOLIC BLOOD PRESSURE: 124 MMHG | DIASTOLIC BLOOD PRESSURE: 61 MMHG | HEART RATE: 57 BPM | BODY MASS INDEX: 21.35 KG/M2

## 2022-06-17 PROCEDURE — 99212 OFFICE O/P EST SF 10 MIN: CPT

## 2022-06-17 PROCEDURE — 93880 EXTRACRANIAL BILAT STUDY: CPT

## 2022-06-17 NOTE — HISTORY OF PRESENT ILLNESS
[FreeTextEntry1] : 89 yo M w/h/o CLL, PUD, gout, CAD, s/p PTCA x 2 (last in 2010 ), colon polyps hx recurrent PE/bilateral DVT taking Eliquis 2.5mg BID for life, presenting to office for carotid artery disease surveillance. Doing well, denies neurologic deficits or symptoms.\par \par Today complaining of some arthritis in the knee and neck.

## 2022-06-17 NOTE — PROCEDURE
[FreeTextEntry1] : Carotid duplex done today to evaluate carotid stenosis shows left >70% (249/87), right 50-69%

## 2022-06-17 NOTE — ASSESSMENT
[FreeTextEntry1] : 89 yo M w/h/o CLL, PUD, gout, CAD, s/p PTCA x 2 (last in 2010 ), colon polyps hx recurrent PE/bilateral DVT on Eliquis, presenting to office for carotid artery disease surveillance. Doing well, denies neurologic deficits or symptoms. Carotid US done today showing stable carotid stenosis, we will continue to monitor closely, would prefer to come in three months again. Will FU with Dr. Andrade in 3 months for carotid US surveillance.

## 2022-06-21 ENCOUNTER — APPOINTMENT (OUTPATIENT)
Dept: HEMATOLOGY ONCOLOGY | Facility: CLINIC | Age: 87
End: 2022-06-21
Payer: MEDICARE

## 2022-06-21 ENCOUNTER — LABORATORY RESULT (OUTPATIENT)
Age: 87
End: 2022-06-21

## 2022-06-21 VITALS
OXYGEN SATURATION: 97 % | BODY MASS INDEX: 21.82 KG/M2 | HEART RATE: 72 BPM | WEIGHT: 139 LBS | HEIGHT: 67 IN | TEMPERATURE: 97.2 F | DIASTOLIC BLOOD PRESSURE: 50 MMHG | SYSTOLIC BLOOD PRESSURE: 136 MMHG

## 2022-06-21 PROCEDURE — 99214 OFFICE O/P EST MOD 30 MIN: CPT | Mod: 25

## 2022-06-21 PROCEDURE — 36415 COLL VENOUS BLD VENIPUNCTURE: CPT

## 2022-06-21 RX ORDER — GLUC/MSM/COLGN2/HYAL/ANTIARTH3 375-375-20
TABLET ORAL
Refills: 0 | Status: DISCONTINUED | COMMUNITY
End: 2022-06-21

## 2022-06-21 NOTE — HISTORY OF PRESENT ILLNESS
[de-identified] : Patient with CLL here for follow up  [de-identified] : He is tolerating Eliquis well without any bruising or bleeding. CBC is stable, Hb and platelet is stable. 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 He is scheduled to see Dr. Muniz from urology tomorrow. \par \par He is on iron tablets, tolerating well. \par \par Received IV Iron x2...\par \par 11-5-2019 getting pains in his feet when he takes the support stockings off...\par \par May 19, 2020 patient requested a follow-up appointment since he had blood work done last week and he wanted to discuss his results\par \par 1/28/2020 pt returns for f/u...seeing multiple docs...[facility], even so his total cholesterol is 164 by their results (which are scanned in...) was also told to go back on Aspirin, but pt has low plt, and had 2 bleeding ulcers...\par \par August 10, 2020 returning for follow-up has no complaints... Unable to visit [facility] this summer because of the pandemic... Keeping social distance for most part\par \par August 18, 2020 patient was found to have worsening thrombocytopenia... I do not believe the thrombocytopenia is secondary to his CLL... Therefore went very carefully over his medication list..\par \par January 21, 2021 patient requested a follow-up appointment to discuss his urinary symptoms... He was seen by urology and he was proposed procedure to improve his nocturia... At this point patient has nocturia x4-5 times and is unable to get a good night sleep...\par \par \par February 11,2021...had repeat blood work yesterday...plt low...\par \par March 13, 2021 patient returns to the office stating "total collapse of the systems"... Unable to eat, short of breath, constipated...\par \par July 14, 2021 returns to the office is doing well... After his recent admission to [facility] for GI bleed, he was read[facility] to [facility] where he was diagnosed with pseudogout, and was placed on prednisone with good relief of his left leg pain\par \par September 22, 2021 patient with CLL returns for follow-up several days before going to [facility] for 3 weeks...\par \par December 13,2021 returns for follow-up... Did not enjoy his last trip to [facility] since he did not have his own accommodations...\par \par March 21, 2022 returns for follow-up no complaints... His arthritic pain is controlled with Tylenol arthritis as opposed to regular Tylenol\par \par \par June 21,2022 main complain jiont pain...taking arthritis Tylenol , but not daily...not travelling to [facility]...

## 2022-06-21 NOTE — ASSESSMENT
[FreeTextEntry1] :  Patient with CLL, developed PE post his urological procedure... Remains on Eliquis... Therefore cannot take NSAIDs... Patient understands...\par \par Patient developed worsening anemia with low haptoglobin ... Repeat blood work done today...\par \par Follow-up here in 3 months or sooner if needed

## 2022-06-26 ENCOUNTER — RX RENEWAL (OUTPATIENT)
Age: 87
End: 2022-06-26

## 2022-06-29 LAB
ALBUMIN SERPL ELPH-MCNC: 4.8 G/DL
ALP BLD-CCNC: 77 U/L
ALT SERPL-CCNC: 10 U/L
ANION GAP SERPL CALC-SCNC: 12 MMOL/L
AST SERPL-CCNC: 17 U/L
BASOPHILS # BLD AUTO: 0.06 K/UL
BASOPHILS NFR BLD AUTO: 0.9 %
BILIRUB SERPL-MCNC: 0.4 MG/DL
BUN SERPL-MCNC: 33 MG/DL
CALCIUM SERPL-MCNC: 9.4 MG/DL
CHLORIDE SERPL-SCNC: 107 MMOL/L
CO2 SERPL-SCNC: 25 MMOL/L
CREAT SERPL-MCNC: 1.45 MG/DL
EGFR: 45 ML/MIN/1.73M2
EOSINOPHIL # BLD AUTO: 0.19 K/UL
EOSINOPHIL NFR BLD AUTO: 2.6 %
ERYTHROCYTE [SEDIMENTATION RATE] IN BLOOD BY WESTERGREN METHOD: 7 MM/HR
GLUCOSE SERPL-MCNC: 95 MG/DL
HCT VFR BLD CALC: 29.8 %
HGB BLD-MCNC: 9.4 G/DL
LDH SERPL-CCNC: 283 U/L
LYMPHOCYTES # BLD AUTO: 5.77 K/UL
LYMPHOCYTES NFR BLD AUTO: 81 %
MAN DIFF?: NORMAL
MCHC RBC-ENTMCNC: 31.5 GM/DL
MCHC RBC-ENTMCNC: 32 PG
MCV RBC AUTO: 101.4 FL
MONOCYTES # BLD AUTO: 0.24 K/UL
MONOCYTES NFR BLD AUTO: 3.4 %
NEUTROPHILS # BLD AUTO: 0.56 K/UL
NEUTROPHILS NFR BLD AUTO: 7.8 %
PLATELET # BLD AUTO: 66 K/UL
POTASSIUM SERPL-SCNC: 4.6 MMOL/L
PROT SERPL-MCNC: 6.8 G/DL
RBC # BLD: 2.94 M/UL
RBC # FLD: 15.8 %
SODIUM SERPL-SCNC: 143 MMOL/L
WBC # FLD AUTO: 7.12 K/UL

## 2022-07-01 ENCOUNTER — APPOINTMENT (OUTPATIENT)
Dept: HEART AND VASCULAR | Facility: CLINIC | Age: 87
End: 2022-07-01

## 2022-07-01 VITALS
OXYGEN SATURATION: 97 % | WEIGHT: 137 LBS | HEART RATE: 42 BPM | HEIGHT: 67 IN | TEMPERATURE: 98.1 F | SYSTOLIC BLOOD PRESSURE: 122 MMHG | RESPIRATION RATE: 16 BRPM | BODY MASS INDEX: 21.5 KG/M2 | DIASTOLIC BLOOD PRESSURE: 44 MMHG

## 2022-07-01 PROCEDURE — 99213 OFFICE O/P EST LOW 20 MIN: CPT

## 2022-07-01 RX ORDER — ALFUZOSIN HYDROCHLORIDE 10 MG/1
10 TABLET, EXTENDED RELEASE ORAL
Refills: 0 | Status: DISCONTINUED | COMMUNITY
End: 2022-07-01

## 2022-07-01 NOTE — PHYSICAL EXAM
[Normal Conjunctiva] : normal conjunctiva [Moves all extremities] : moves all extremities [Normal] : alert and oriented, normal memory [de-identified] : using wheeling walker [de-identified] : mild bilateral ankle edema

## 2022-07-01 NOTE — HISTORY OF PRESENT ILLNESS
[FreeTextEntry1] : 89 year male who comes for Cardiology followup. He denies having any chest pain, SOB, FISH, dizziness, orthopnea, PND or syncope. He notes having baseline palpitations. He plans to see Dr Baltazar. He describes intermittent swelling of his lower extremities

## 2022-07-01 NOTE — ASSESSMENT
[FreeTextEntry1] : CAD, Hypertension, edema, bradycardia-- We discussed that Amlodipine can contribute to edema

## 2022-07-05 ENCOUNTER — APPOINTMENT (OUTPATIENT)
Dept: UROLOGY | Facility: CLINIC | Age: 87
End: 2022-07-05

## 2022-07-08 ENCOUNTER — NON-APPOINTMENT (OUTPATIENT)
Age: 87
End: 2022-07-08

## 2022-08-01 NOTE — PHYSICAL THERAPY INITIAL EVALUATION ADULT - ASR WT BEARING STATUS EVAL
MEDICARE WELLNESS VISIT + NOTE    CHIEF COMPLAINT:  Tania Nichols presents for her Subsequent Annual Medicare Wellness Visit.   Her additional complaints or concerns are addressed below.      Patient Care Team:  Weston Ceja MD as PCP - General (Family Practice)  Emmanuel MORRISON DO as Cardiologist (Cardiovascular Disease)  Corey Hill MD (Vascular Surgery)        Patient Active Problem List   Diagnosis   • Anemia   • Atherosclerosis of native coronary artery of native heart without angina pectoris   • Paroxysmal atrial fibrillation (CMS/HCC)   • Atrial septal defect   • Benign essential hypertension   • Bradycardia   • Hx of CABG   • Chronic stable angina (CMS/HCC)   • Constipation   • Essential familial hypercholesterolemia   • History of cardiac catheterization   • Sciatica of left side   • Hospital discharge follow-up   • Refused influenza vaccine   • Osteoarthritis of spine with radiculopathy, lumbar region   • Special screening for malignant neoplasms, colon         Past Medical History:   Diagnosis Date   • Anemia 03/14/2016   • Atherosclerosis of native coronary artery of native heart without angina pectoris 02/12/2018   • Atrial septal defect 04/05/2010   • Benign essential hypertension 04/05/2010   • Blood transfusion without reported diagnosis    • Bradycardia 10/18/2016   • Chronic stable angina (CMS/HCC) 09/05/2018   • Constipation 09/26/2016   • Essential familial hypercholesterolemia 04/05/2010   • History of cardiac catheterization 02/10/2017   • Hx of CABG 09/25/2013   • Hx of cardiac catheterization     12/17/2018:  Patent LIMA to LAD, SVG to OM, SVG to diagonal, completely occluded SVG to ramus intermedius, 100% occluded proximal left circumflex stent, ostial ramus intermedius and mid LAD, left to left collateral through distal LAD to ramus intermedius, successful ARIELLE (2.25 x 20 mm) placement to mid LAD .   • Osteoarthritis of spine with radiculopathy, lumbar region 12/20/2018   • Paroxysmal  atrial fibrillation (CMS/HCC) 2016   • Sciatica of left side 2018   • Snoring 2016   • Special screening for malignant neoplasms, colon 10/06/2020         Past Surgical History:   Procedure Laterality Date   • Bunionectomy Left    • Coronary artery bypass graft  2017   • Injection therapy vein,mult veins Right 2020    RLE sclerotherapy    • Injection therapy vein,mult veins Right 2021   • Injection therapy vein,mult veins Left 2020    Left leg sclerotherapy    • Stab phlebectomy chase veins Left 2020   • Stent implant  2015   • Us vasc guided endovenous radiofrequency ablation Right 2019    RT GSV RFA   • Us vasc guided endovenous radiofrequency ablation Left 2019    LT Lesser   • Us vasc guided endovenous radiofrequency ablation Right 10/11/2019    RT LSV RFA         Social History     Tobacco Use   • Smoking status: Former Smoker     Packs/day: 0.00     Quit date:      Years since quittin.5   • Smokeless tobacco: Never Used   Vaping Use   • Vaping Use: never used   Substance Use Topics   • Alcohol use: Yes     Comment: occasionally   • Drug use: Never     Family History   Problem Relation Age of Onset   • Cancer, Liver Mother    • Stroke Father    • Cancer, Colon Neg Hx    • Cancer, Esophageal Neg Hx    • Cancer, Rectal Neg Hx          Current Outpatient Medications   Medication Sig Dispense Refill   • rosuvastatin (CRESTOR) 20 MG tablet TAKE 1 TABLET BY MOUTH DAILY 90 tablet 3   • Ranexa 500 MG 12 hr tablet Take 1 tablet by mouth in the morning and 1 tablet before bedtime. 60 tablet 11   • apixaBAN (Eliquis) 5 MG Tab Take 1 tablet by mouth every 12 hours. 60 tablet 11   • ezetimibe (ZETIA) 10 MG tablet TAKE 1 TABLET BY MOUTH AT BEDTIME 90 tablet 3   • lisinopril (ZESTRIL) 10 MG tablet TAKE 1 TABLET BY MOUTH TWICE DAILY 180 tablet 3   • escitalopram (Lexapro) 5 MG tablet Take 1 tablet by mouth daily. 30 tablet 3   • nitroGLYcerin (NITROSTAT) 0.4 MG  sublingual tablet Place 1 tablet under the tongue every 5 minutes as needed for Chest pain. 20 tablet 3   • docusate sodium (COLACE) 100 MG capsule Take 100 mg by mouth at bedtime as needed.     • Multiple Vitamins-Minerals (VITAMIN - THERAPEUTIC MULTIVITAMINS W/MINERALS) tablet Take by mouth daily.     • cholecalciferol (VITAMIN D3) 1000 UNITS tablet Take 1,000 Units by mouth daily.      • Coenzyme Q10 200 MG Cap Take 200 mg by mouth daily.        No current facility-administered medications for this visit.        The following items on the Medicare Health Risk Assessment were found to be positive  1.) Do you have an Advance directive, living will, or power of  for health care document that contains your wishes for end of life care?: No     6 b.) How many servings of High Fiber / Whole Grain Foods to you have each day ( 1 serving = 1 cup cold cereal, 1/2 cup cooked cereal, 1 slice bread): 1 per day         Vision and Hearing screens: Both screenings were performed and reviewed    Advance Directive:   The patient has the following documents:  No Advance Directives on file. Patient offered documents.    Cognitive/Functional Status: no evidence of cognitive dysfunction by direct observation    Opioid Review: Tania is not taking opioid medications.    Recent PHQ 2/9 Score:    PHQ 2:  Date Adult PHQ 2 Score Adult PHQ 2 Interpretation   8/1/2022 0 No further screening needed       PHQ 9:       DEPRESSION ASSESSMENT/PLAN:  Depression screening is negative no further plan needed.     Body mass index is 33.43 kg/m².    BMI ASSESSMENT/PLAN:  Patient is obese.    Low carbohydrate diet        See Patient Instructions section.   Return in about 1 year (around 8/1/2023) for Medicare Wellness Visit.      OUTPATIENT PROGRESS NOTE    Subjective   Chief Complaint Medicare Wellness Visit (MEDICARE WELLNESS, FASTING)  Patient has given consent to record this visit for documentation in their clinical record.    HPI Medicare  annual wellness visit, subsequent:  Due for labs. She is fasting today.     Has received two primary and one booster Covid vaccine.   Due for tetanus and shingles vaccine.   Due for mammogram. She will undergo the same in September.     Paroxysmal atrial fibrillation (CMS/HCC): On nitroglycerin (NITROSTAT) 0.4 MG sublingual tablet      Essential familial hypercholesterolemia: Due for labs.       Benign essential hypertension: On lisinopril (ZESTRIL) 10 mg.      Anemia, unspecified type: Due for labs.       Chronic idiopathic constipation: On docusate sodium (COLACE) 100 mg.       Atherosclerosis of native coronary artery of native heart without angina pectoris: Is a known case of.       Class 1 obesity due to excess calories with serious comorbidity and body mass index (BMI) of 33.0 to 33.9 in adult: weight is 218 lbs.       Need for hepatitis C screening test: Due for labs.    Medications  Medications were reviewed and updated today.    Histories  I have personally reviewed and updated the patient's past medical, past surgical, family and social histories during today's visit.    Review of Systems  Constitutional: Negative for chills, diaphoresis, fever and malaise/fatigue.  HENT: Negative for congestion, ear discharge, ear pain, hearing loss, nosebleeds, sinus pain and sore throat.    Eyes: Negative for blurred vision, pain, discharge and redness.  Respiratory: Negative for cough, shortness of breath and wheezing.    Cardiovascular: Negative for chest pain and palpitations.  Gastrointestinal: Negative for constipation, diarrhea, nausea and vomiting.  Genitourinary: Negative for dysuria.  Musculoskeletal: Negative for myalgias.  Skin: Negative for itching and rash.  Neurological: Negative for dizziness and headaches.  Psychiatric/Behavioral: Negative.   All other systems reviewed and are negative.   Objective   Visit Vitals  /64   Pulse (!) 55   Temp 96.9 °F (36.1 °C)   Resp 16   Ht 5' 7.75\" (1.721 m)   Wt 99  kg (218 lb 4.1 oz)   BMI 33.43 kg/m²     Physical Exam  Constitutional:       General: She is not in acute distress.     Appearance: She is well-developed. She is not diaphoretic.   HENT:      Head: Normocephalic and atraumatic.      Right Ear: External ear normal.      Left Ear: External ear normal.      Nose: Nose normal.      Mouth/Throat:      Pharynx: No oropharyngeal exudate.   Eyes:      General: No scleral icterus.        Right eye: No discharge.         Left eye: No discharge.      Conjunctiva/sclera: Conjunctivae normal.      Pupils: Pupils are equal, round, and reactive to light.   Neck:      Thyroid: No thyromegaly.      Vascular: No JVD.      Trachea: No tracheal deviation.   Cardiovascular:      Rate and Rhythm: Normal rate and regular rhythm.      Heart sounds: Normal heart sounds. No murmur heard.    No friction rub. No gallop.   Pulmonary:      Effort: Pulmonary effort is normal. No respiratory distress.      Breath sounds: Normal breath sounds. No stridor. No wheezing or rales.   Chest:      Chest wall: No tenderness.   Abdominal:      General: Bowel sounds are normal. There is no distension.      Palpations: Abdomen is soft. There is no mass.      Tenderness: There is no abdominal tenderness. There is no guarding or rebound.   Musculoskeletal:         General: Normal range of motion.      Cervical back: Normal range of motion and neck supple.   Lymphadenopathy:      Cervical: No cervical adenopathy.   Skin:     General: Skin is warm.      Coloration: Skin is not pale.      Findings: No erythema or rash.   Neurological:      Mental Status: She is alert and oriented to person, place, and time.   Psychiatric:         Behavior: Behavior normal.         Thought Content: Thought content normal.         Judgment: Judgment normal.         Laboratory  I have reviewed the pertinent laboratory tests. These are the pertinent findings:  · Stable    Imaging  I have reviewed the pertinent imaging study reports.  These are the pertinent findings:  · Stable    Assessment & Plan   Diagnoses and associated orders for this visit:  1. Medicare annual wellness visit, subsequent  -     ANNUAL WELLNESS VISIT SUBSEQUENT VISIT W PPS  -     Vitamin D  -     CBC with Automated Differential  -     Comprehensive Metabolic Panel  -     Folate  -     Glycohemoglobin  -     Lipid Panel With Reflex  -     Microalbumin Urine Random  -     Thyroid Stimulating Hormone Reflex  -     Urinalysis & Reflex Microscopy With Culture If Indicated  -     Vitamin B12  -     Vitamin D -25 Hydroxy  2. Paroxysmal atrial fibrillation (CMS/HCC)  -     CBC with Automated Differential  -     Comprehensive Metabolic Panel  -     Folate  -     Glycohemoglobin  -     Lipid Panel With Reflex  -     Microalbumin Urine Random  -     Thyroid Stimulating Hormone Reflex  -     Urinalysis & Reflex Microscopy With Culture If Indicated  -     Vitamin B12  -     Vitamin D -25 Hydroxy  3. Essential familial hypercholesterolemia  -     CBC with Automated Differential  -     Comprehensive Metabolic Panel  -     Folate  -     Glycohemoglobin  -     Lipid Panel With Reflex  -     Microalbumin Urine Random  -     Thyroid Stimulating Hormone Reflex  -     Urinalysis & Reflex Microscopy With Culture If Indicated  -     Vitamin B12  -     Vitamin D -25 Hydroxy  4. Benign essential hypertension  -     CBC with Automated Differential  -     Comprehensive Metabolic Panel  -     Folate  -     Glycohemoglobin  -     Lipid Panel With Reflex  -     Microalbumin Urine Random  -     Thyroid Stimulating Hormone Reflex  -     Urinalysis & Reflex Microscopy With Culture If Indicated  -     Vitamin B12  -     Vitamin D -25 Hydroxy  5. Anemia, unspecified type  -     CBC with Automated Differential  -     Comprehensive Metabolic Panel  -     Folate  -     Glycohemoglobin  -     Lipid Panel With Reflex  -     Microalbumin Urine Random  -     Thyroid Stimulating Hormone Reflex  -     Urinalysis &  Reflex Microscopy With Culture If Indicated  -     Vitamin B12  -     Vitamin D -25 Hydroxy  6. Chronic idiopathic constipation  -     CBC with Automated Differential  -     Comprehensive Metabolic Panel  -     Folate  -     Glycohemoglobin  -     Lipid Panel With Reflex  -     Microalbumin Urine Random  -     Thyroid Stimulating Hormone Reflex  -     Urinalysis & Reflex Microscopy With Culture If Indicated  -     Vitamin B12  -     Vitamin D -25 Hydroxy  7. Atherosclerosis of native coronary artery of native heart without angina pectoris  -     CBC with Automated Differential  -     Comprehensive Metabolic Panel  -     Folate  -     Glycohemoglobin  -     Lipid Panel With Reflex  -     Microalbumin Urine Random  -     Thyroid Stimulating Hormone Reflex  -     Urinalysis & Reflex Microscopy With Culture If Indicated  -     Vitamin B12  -     Vitamin D -25 Hydroxy  8. Class 1 obesity due to excess calories with serious comorbidity and body mass index (BMI) of 33.0 to 33.9 in adult  -     CBC with Automated Differential  -     Comprehensive Metabolic Panel  -     Folate  -     Glycohemoglobin  -     Lipid Panel With Reflex  -     Microalbumin Urine Random  -     Thyroid Stimulating Hormone Reflex  -     Urinalysis & Reflex Microscopy With Culture If Indicated  -     Vitamin B12  -     Vitamin D -25 Hydroxy  9. Need for hepatitis C screening test  -     Hepatitis C Antibody With Reflex  Medicare annual wellness visit, subsequent:  Ordered labs. Refer to order. Reports will be notified.    Encouraged receiving shingles and tetanus vaccine from local pharmacy.      Paroxysmal atrial fibrillation (CMS/HCC):  Continue current medication. Refills provided.        Essential familial hypercholesterolemia    Ordered labs. Refer to order. Reports will be notified.      Benign essential hypertension:  Continue current medication. Refills provided.        Anemia, unspecified type   Ordered labs. Refer to order. Reports will be  no weight-bearing restrictions notified.       Chronic idiopathic constipation    Continue current medication. Refills provided.       Atherosclerosis of native coronary artery of native heart without angina pectoris   Continue to monitor.    Class 1 obesity due to excess calories with serious comorbidity and body mass index (BMI) of 33.0 to 33.9 in adult   Encouraged weight loss.  Risks associated with overweight and obesity were discussed with the patient  Discussed the importance of limiting portion sizes, limiting sugar intake and reducing calories.  Timing of food intake discussed.Reinforced need for increased physical activity.     Need for hepatitis C screening test   Ordered labs. Refer to order. Reports will be notified.      Refer to orders.  Medical compliance with plan discussed and risks of non-compliance reviewed.  Patient education completed on disease process, etiology & prognosis.  Proper usage and side effects of medications reviewed & discussed.  Return to clinic as clinically indicated as discussed with patient who verbalized understanding of the plan and is in agreement with the plan.    I,  Dr. Joana Pavon, have created a visit summary document based on the audio recording between Dr. Weston Ceja MD and this patient for the physician to review, edit as needed, and authenticate.  Creation Date: 8/1/2022      I have reviewed and edited the visit summary above and attest that it is accurate.

## 2022-08-03 ENCOUNTER — APPOINTMENT (OUTPATIENT)
Dept: ORTHOPEDIC SURGERY | Facility: CLINIC | Age: 87
End: 2022-08-03

## 2022-08-16 ENCOUNTER — APPOINTMENT (OUTPATIENT)
Dept: UROLOGY | Facility: CLINIC | Age: 87
End: 2022-08-16

## 2022-08-16 VITALS — HEART RATE: 78 BPM | DIASTOLIC BLOOD PRESSURE: 71 MMHG | SYSTOLIC BLOOD PRESSURE: 150 MMHG | TEMPERATURE: 97.8 F

## 2022-08-16 PROCEDURE — 99214 OFFICE O/P EST MOD 30 MIN: CPT

## 2022-08-16 NOTE — HISTORY OF PRESENT ILLNESS
[FreeTextEntry1] : s/p urolift 3/2021\par on alufzosni and fiansterdie\par minimal bother\par recent admit with covid and PNA\par feels well\par no hematuria\par no dysuria\par

## 2022-08-19 ENCOUNTER — APPOINTMENT (OUTPATIENT)
Dept: INTERNAL MEDICINE | Facility: CLINIC | Age: 87
End: 2022-08-19

## 2022-08-19 VITALS
DIASTOLIC BLOOD PRESSURE: 60 MMHG | BODY MASS INDEX: 20.4 KG/M2 | HEART RATE: 60 BPM | HEIGHT: 67 IN | WEIGHT: 130 LBS | TEMPERATURE: 98.2 F | OXYGEN SATURATION: 96 % | SYSTOLIC BLOOD PRESSURE: 124 MMHG | RESPIRATION RATE: 16 BRPM

## 2022-08-19 PROCEDURE — 99215 OFFICE O/P EST HI 40 MIN: CPT

## 2022-08-19 NOTE — PLAN
[FreeTextEntry1] : CHF - EF 30-35% new from last December \par  - no change in meds - will arrange f/up with Dr Hodge,\par \par PNA  - resolved\par Anemia - encourage f/up with Dr Oliveira - stable h/h\par left knee pain - \par - tylenol bid dosing\par - f/up PRN with Dr Baltazar\par \par PE to continue on Eliquis\par \par HTN- controlled\par \par CKD 3 lbs reviewed from the hospital Cr from 1.4 to 1.6 stable

## 2022-08-19 NOTE — PHYSICAL EXAM
[Normal Rate] : normal rate  [Regular Rhythm] : with a regular rhythm [No Edema] : there was no peripheral edema [Normal] : affect was normal and insight and judgment were intact [de-identified] : left knee- no swelling, no erythema [de-identified] : bruising b/l ue's

## 2022-08-19 NOTE — HISTORY OF PRESENT ILLNESS
[de-identified] : 89 yo M w/ hx CLL, DVT, PUD ,CKD 3,  gout, CAD, s/p 2 stents(last in 2010 ),colon polyps, PE after Urolift procedure\par -remains on eliquis- [facility]par - recent admission with covid PNA at [facility] for 5 days - 7/29 to 8/3\par - left knee remains an issue - has appt pending with ortho.\par  \par \par

## 2022-08-31 ENCOUNTER — RESULT REVIEW (OUTPATIENT)
Age: 87
End: 2022-08-31

## 2022-08-31 ENCOUNTER — APPOINTMENT (OUTPATIENT)
Dept: ORTHOPEDIC SURGERY | Facility: CLINIC | Age: 87
End: 2022-08-31

## 2022-08-31 ENCOUNTER — OUTPATIENT (OUTPATIENT)
Dept: OUTPATIENT SERVICES | Facility: HOSPITAL | Age: 87
LOS: 1 days | End: 2022-08-31
Payer: MEDICARE

## 2022-08-31 VITALS
OXYGEN SATURATION: 97 % | BODY MASS INDEX: 20.4 KG/M2 | SYSTOLIC BLOOD PRESSURE: 129 MMHG | HEART RATE: 57 BPM | DIASTOLIC BLOOD PRESSURE: 49 MMHG | WEIGHT: 130 LBS | HEIGHT: 67 IN

## 2022-08-31 DIAGNOSIS — Z90.49 ACQUIRED ABSENCE OF OTHER SPECIFIED PARTS OF DIGESTIVE TRACT: Chronic | ICD-10-CM

## 2022-08-31 DIAGNOSIS — Z98.890 OTHER SPECIFIED POSTPROCEDURAL STATES: Chronic | ICD-10-CM

## 2022-08-31 DIAGNOSIS — M87.9 OSTEONECROSIS, UNSPECIFIED: ICD-10-CM

## 2022-08-31 PROCEDURE — 73502 X-RAY EXAM HIP UNI 2-3 VIEWS: CPT

## 2022-08-31 PROCEDURE — 99213 OFFICE O/P EST LOW 20 MIN: CPT

## 2022-08-31 PROCEDURE — 73502 X-RAY EXAM HIP UNI 2-3 VIEWS: CPT | Mod: 26,LT

## 2022-09-01 NOTE — PHYSICAL EXAM
[de-identified] : General appearance: thin body habitus, pleasant, alert and oriented x 3, cooperative. \par HEENT: normocephalic, EOM intact, wearing mask, external auditory canal clear. \par Cardiovascular: no lower leg edema, no varicosities, dorsalis pedis pulses palpable and symmetric. \par Lymphatics: no palpable lymphadenopathy, no lymphedema. \par Neurologic: sensation is normal, no muscle weakness in upper or lower extremities, patella tendon reflexes present and symmetric. \par Dermatologic: skin moist, warm, no rash. \par Spine: cervical spine with normal lordosis and painless range of motion, thoracic spine with moderate kyphosis and painless range of motion, lumbosacral spine with normal lordosis and painless range of motion.  No tenderness to palpation along midline spine and paraspinal musculature.  Sacroiliac joint mildly tender bilaterally. Negative SLR and crossed SLR tests bilaterally.\par Gait: spinal gait noted \par  \par Limb lengths: Shortened left leg compared to the right \par  \par Left hip:\par - Swelling:\par - Ecchymosis:\par - Erythema:\par - Wounds:\par - Tenderness:\par - ROM: 120 flexion, 0 extension, 10 adduction, 20 abduction, 10 internal rotation, 15 external rotation\par - BONIFACIO:\par - FADIR:\par - Concetta:\par - Stinchfield:\par - Flexor power: 4/5\par - Abductor power: 4/5\par  \par Right hip:\par - Swelling:\par - Ecchymosis:\par - Erythema:\par - Wounds:\par - Tenderness:\par - ROM: 120 flexion, 0 extension, 20 adduction, 30 abduction, 0 internal rotation, 65 external rotation\par - BONIFACIO:\par - FADIR:\par - Concetta:\par - Stinchfield:\par - Flexor power: 4/5\par - Abductor power: 4/5\par  \par Left knee: all fields negative/normal/unremarkable unless otherwise noted --\par - Focal soft tissue swelling: \par - Ecchymosis: \par - Erythema: \par - Effusion: \par - Wounds: \par - Alignment: \par - Tenderness: \par - ROM: 0-120\par - Collateral laxity: Stable\par - Cruciate laxity: Stable\par - Meniscal signs: negative Mayo and Goyo.  \par - Popliteal angle (degrees): 60 degrees\par - Quad strength: \par \par Right knee: all fields negative/normal/unremarkable unless otherwise noted --\par - Focal soft tissue swelling: \par - Ecchymosis: \par - Erythema: \par - Effusion: \par - Wounds: \par - Alignment: \par - Tenderness: \par - ROM: 0-120\par - Collateral laxity: Stable\par - Cruciate laxity: Stable\par - Meniscal signs: negative Mayo and Goyo.  \par - Popliteal angle (degrees): 80\par - Quad strength: \par \par  [de-identified] : Pelvis and left hip XRs were obtained today, interpreted by me, and reviewed with the patient.\par \par Pelvic alignment: normal\par \par Right hip --\par Alignment: normal\par Arthritis: mild joint space narrowing\par Deformity: none\par Osteonecrosis: none\par \par Left hip --\par Alignment: normal\par Arthritis: complete joint space obliteration\par Deformity: none\par Osteonecrosis: cysts and sclerosis throughout femoral neck and head

## 2022-09-01 NOTE — HISTORY OF PRESENT ILLNESS
[de-identified] : 8/31/22: 89y/o male following up for left hip osteoarthritis. Reports multiple additional hospital admissions since our last visit; for pneumonia and COVID. Left hip and thigh pain has been increasing over time. Taking Tylenol without relief. Pain scale is 4-6/10. Uses rollator at all times. Remains uninterested in any surgical management.\par \par 2/23/22: Reports that the left hip pain has progressed since last visit. Remains able to perform ADLs and tasks outside the house, but with more pain. He is not interested in any surgical intervention.\par \par 9/21/21: 89y/o male presenting for evaluation of L > R knee and LLE pain. Symptoms arose relatively suddenly while admitted at [facility] for a separate issue (acute RLE/RUE DVT and PE) in April 2021. Symptoms have gradually resolved on their own and he is currently only having minimal left hip and thigh pain. Ambulates with a rollator and can make it about 6 blocks. He is stopped more by cardiovascular fatigue than leg pain. At the time of the pain flare, someone diagnosed him with pseudogout though he does not believe any joint aspiration was ever performed. He denies any joint swelling, redness, or warmth at any time.

## 2022-09-01 NOTE — DISCUSSION/SUMMARY
[de-identified] : 89y/o male with left hip osteonecrosis and osteoarthritis, bilateral knee osteoarthritis\par - We discussed that MAGI would be the definitive management for his left hip OA/ON. He remains unwilling to consider it at this time, which i think is reasonable.\par - PT/HEP\par - Tylenol for pain\par - Follow up with me annually with repeat left hip XRs or earlier as needed. While I acknowledge that he is a high risk candidate for surgery, I advised him to reconsider MAGI if he feels that the pain is becoming unbearable and/or if he feels he is at risk for losing his basic mobility and independence.

## 2022-09-13 ENCOUNTER — APPOINTMENT (OUTPATIENT)
Dept: VASCULAR SURGERY | Facility: CLINIC | Age: 87
End: 2022-09-13

## 2022-09-16 ENCOUNTER — APPOINTMENT (OUTPATIENT)
Dept: VASCULAR SURGERY | Facility: CLINIC | Age: 87
End: 2022-09-16

## 2022-09-16 VITALS
HEART RATE: 69 BPM | HEIGHT: 67 IN | DIASTOLIC BLOOD PRESSURE: 67 MMHG | BODY MASS INDEX: 20.4 KG/M2 | WEIGHT: 130 LBS | SYSTOLIC BLOOD PRESSURE: 104 MMHG

## 2022-09-16 PROCEDURE — 99214 OFFICE O/P EST MOD 30 MIN: CPT

## 2022-09-16 PROCEDURE — 93880 EXTRACRANIAL BILAT STUDY: CPT

## 2022-09-16 NOTE — ASSESSMENT
[FreeTextEntry1] : 89yoM w/h/o CLL, PUD, gout, CAD, s/p PTCA x 2 (last in 2010), colon polyps and h/o recurrent PE/bilateral DVT taking Eliquis 2.5mg BID for life, returning to the office for evaluation of his ICA stenosis.  Pt denies any neurologic deficits or symptoms and is compliant w/all medications.\par \par Normal neuro exam noted today, and carotid duplex done today to evaluate carotid stenosis demonstrates stable L ICA stenosis >70%, ARTEMIO 50-69% stenosis and no change from previous duplex.  Reassured pt that no intervention is indicated at this time, but pt should continue all meds including Eliquis, and RTO in 6mos.

## 2022-09-16 NOTE — PROCEDURE
[FreeTextEntry1] : Carotid duplex done today to evaluate carotid stenosis demonstrates stable L ICA stenosis >70%, ARTEMIO 50-69% stenosis and no change from previous duplex

## 2022-09-16 NOTE — PHYSICAL EXAM
[JVD] : no jugular venous distention  [Carotid Bruits] : no carotid bruits [Ankle Swelling (On Exam)] : not present [Varicose Veins Of Lower Extremities] : not present [] : not present [Abdomen Tenderness] : ~T ~M No abdominal tenderness [de-identified] : WN/WD, NAD [de-identified] : NC/AT [de-identified] : FROM throughout, strength 5/5x4 [de-identified] : CNII-XII/HEYDI grossly intact

## 2022-09-16 NOTE — HISTORY OF PRESENT ILLNESS
[FreeTextEntry1] : 89yoM w/h/o CLL, PUD, gout, CAD, s/p PTCA x 2 (last in 2010), colon polyps and h/o recurrent PE/bilateral DVT taking Eliquis 2.5mg BID for life, returning to the office for evaluation of his ICA stenosis.  Pt denies any neurologic deficits or symptoms and is compliant w/all medications.

## 2022-09-20 ENCOUNTER — APPOINTMENT (OUTPATIENT)
Dept: HEMATOLOGY ONCOLOGY | Facility: CLINIC | Age: 87
End: 2022-09-20

## 2022-09-20 ENCOUNTER — LABORATORY RESULT (OUTPATIENT)
Age: 87
End: 2022-09-20

## 2022-09-20 VITALS
HEART RATE: 58 BPM | SYSTOLIC BLOOD PRESSURE: 150 MMHG | OXYGEN SATURATION: 97 % | BODY MASS INDEX: 20.56 KG/M2 | TEMPERATURE: 98 F | DIASTOLIC BLOOD PRESSURE: 53 MMHG | HEIGHT: 67 IN | WEIGHT: 131 LBS

## 2022-09-20 PROCEDURE — 99213 OFFICE O/P EST LOW 20 MIN: CPT | Mod: 25

## 2022-09-20 PROCEDURE — 36415 COLL VENOUS BLD VENIPUNCTURE: CPT

## 2022-09-20 NOTE — HISTORY OF PRESENT ILLNESS
[de-identified] : Patient with CLL here for follow up  [de-identified] : He is tolerating Eliquis well without any bruising or bleeding. CBC is stable, Hb and platelet is stable. 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 He is scheduled to see Dr. Muniz from urology tomorrow. \par \par He is on iron tablets, tolerating well. \par \par Received IV Iron x2...\par \par 11-5-2019 getting pains in his feet when he takes the support stockings off...\par \par May 19, 2020 patient requested a follow-up appointment since he had blood work done last week and he wanted to discuss his results\par \par 1/28/2020 pt returns for f/u...seeing multiple docs...[facility], even so his total cholesterol is 164 by their results (which are scanned in...) was also told to go back on Aspirin, but pt has low plt, and had 2 bleeding ulcers...\par \par August 10, 2020 returning for follow-up has no complaints... Unable to visit [facility] this summer because of the pandemic... Keeping social distance for most part\par \par August 18, 2020 patient was found to have worsening thrombocytopenia... I do not believe the thrombocytopenia is secondary to his CLL... Therefore went very carefully over his medication list..\par \par January 21, 2021 patient requested a follow-up appointment to discuss his urinary symptoms... He was seen by urology and he was proposed procedure to improve his nocturia... At this point patient has nocturia x4-5 times and is unable to get a good night sleep...\par \par \par February 11,2021...had repeat blood work yesterday...plt low...\par \par March 13, 2021 patient returns to the office stating "total collapse of the systems"... Unable to eat, short of breath, constipated...\par \par July 14, 2021 returns to the office is doing well... [facility] where he was diagnosed with pseudogout, and was placed on prednisone with good relief of his left leg pain\par \par September 22, 2021 patient with CLL returns for follow-up several days before going to [place] for 3 weeks...\par \par December 13,2021 returns for follow-up... Did not enjoy his last trip to [facility] since he did not have his own accommodations...\par \par March 21, 2022 returns for follow-up no complaints... His arthritic pain is controlled with Tylenol arthritis as opposed to regular Tylenol\par \par \par June 21,2022 main complain jiont pain...taking arthritis Tylenol , but not daily...not travelling to [place]...\par \par \par September 20, 2022 returns for follow-up accompanied by home health aide... Reports being in the hospital twice in August... [facility] and [facility]... Feeling better now...

## 2022-09-20 NOTE — ASSESSMENT
[FreeTextEntry1] :  Patient with CLL, repeat blood work done...\par \par Follow-up here in 3 months or sooner if needed

## 2022-09-28 ENCOUNTER — RX RENEWAL (OUTPATIENT)
Age: 87
End: 2022-09-28

## 2022-10-07 ENCOUNTER — NON-APPOINTMENT (OUTPATIENT)
Age: 87
End: 2022-10-07

## 2022-10-28 ENCOUNTER — APPOINTMENT (OUTPATIENT)
Dept: HEART AND VASCULAR | Facility: CLINIC | Age: 87
End: 2022-10-28

## 2022-11-01 ENCOUNTER — APPOINTMENT (OUTPATIENT)
Dept: HEMATOLOGY ONCOLOGY | Facility: CLINIC | Age: 87
End: 2022-11-01

## 2022-11-01 VITALS
BODY MASS INDEX: 21.97 KG/M2 | HEIGHT: 67 IN | DIASTOLIC BLOOD PRESSURE: 74 MMHG | OXYGEN SATURATION: 97 % | SYSTOLIC BLOOD PRESSURE: 146 MMHG | HEART RATE: 68 BPM | TEMPERATURE: 97.6 F | WEIGHT: 140 LBS

## 2022-11-01 DIAGNOSIS — D61.818 OTHER PANCYTOPENIA: ICD-10-CM

## 2022-11-01 DIAGNOSIS — Z79.01 LONG TERM (CURRENT) USE OF ANTICOAGULANTS: ICD-10-CM

## 2022-11-01 LAB
25(OH)D3 SERPL-MCNC: 83.9 NG/ML
ALBUMIN MFR SERPL ELPH: 63.8 %
ALBUMIN SERPL ELPH-MCNC: 4.5 G/DL
ALBUMIN SERPL-MCNC: 4 G/DL
ALBUMIN/GLOB SERPL: 1.7 RATIO
ALP BLD-CCNC: 66 U/L
ALPHA1 GLOB MFR SERPL ELPH: 5.1 %
ALPHA1 GLOB SERPL ELPH-MCNC: 0.3 G/DL
ALPHA2 GLOB MFR SERPL ELPH: 9.5 %
ALPHA2 GLOB SERPL ELPH-MCNC: 0.6 G/DL
ALT SERPL-CCNC: 9 U/L
ANION GAP SERPL CALC-SCNC: 12 MMOL/L
AST SERPL-CCNC: 20 U/L
B-GLOBULIN MFR SERPL ELPH: 8.6 %
B-GLOBULIN SERPL ELPH-MCNC: 0.5 G/DL
BASOPHILS # BLD AUTO: 0.03 K/UL
BASOPHILS NFR BLD AUTO: 0.6 %
BILIRUB SERPL-MCNC: 0.6 MG/DL
BUN SERPL-MCNC: 51 MG/DL
CALCIUM SERPL-MCNC: 9.1 MG/DL
CHLORIDE SERPL-SCNC: 108 MMOL/L
CO2 SERPL-SCNC: 24 MMOL/L
CREAT SERPL-MCNC: 1.54 MG/DL
DEPRECATED KAPPA LC FREE/LAMBDA SER: 0.33 RATIO
EGFR: 42 ML/MIN/1.73M2
EOSINOPHIL # BLD AUTO: 0.11 K/UL
EOSINOPHIL NFR BLD AUTO: 2.3 %
ERYTHROCYTE [SEDIMENTATION RATE] IN BLOOD BY WESTERGREN METHOD: 12 MM/HR
FERRITIN SERPL-MCNC: 295 NG/ML
GAMMA GLOB FLD ELPH-MCNC: 0.8 G/DL
GAMMA GLOB MFR SERPL ELPH: 13 %
GLUCOSE SERPL-MCNC: 90 MG/DL
HCT VFR BLD CALC: 25.9 %
HGB BLD-MCNC: 8.3 G/DL
IGA SER QL IEP: 85 MG/DL
IGG SER QL IEP: 876 MG/DL
IGM SER QL IEP: 23 MG/DL
IMM GRANULOCYTES NFR BLD AUTO: 0.2 %
INTERPRETATION SERPL IEP-IMP: NORMAL
IRON SATN MFR SERPL: 17 %
IRON SERPL-MCNC: 40 UG/DL
KAPPA LC CSF-MCNC: 11.6 MG/DL
KAPPA LC SERPL-MCNC: 3.85 MG/DL
LDH SERPL-CCNC: 212 U/L
LYMPHOCYTES # BLD AUTO: 2.96 K/UL
LYMPHOCYTES NFR BLD AUTO: 61.3 %
M PROTEIN SPEC IFE-MCNC: NORMAL
MAN DIFF?: NORMAL
MCHC RBC-ENTMCNC: 31.7 PG
MCHC RBC-ENTMCNC: 32 GM/DL
MCV RBC AUTO: 98.9 FL
MONOCYTES # BLD AUTO: 0.25 K/UL
MONOCYTES NFR BLD AUTO: 5.2 %
NEUTROPHILS # BLD AUTO: 1.47 K/UL
NEUTROPHILS NFR BLD AUTO: 30.4 %
PLATELET # BLD AUTO: 60 K/UL
POTASSIUM SERPL-SCNC: 5 MMOL/L
PROT SERPL-MCNC: 6.3 G/DL
RBC # BLD: 2.62 M/UL
RBC # FLD: 17.3 %
SODIUM SERPL-SCNC: 144 MMOL/L
TIBC SERPL-MCNC: 234 UG/DL
TSH SERPL-ACNC: 4.67 UIU/ML
UIBC SERPL-MCNC: 194 UG/DL
VIT B12 SERPL-MCNC: 1893 PG/ML
WBC # FLD AUTO: 4.83 K/UL

## 2022-11-01 PROCEDURE — 99213 OFFICE O/P EST LOW 20 MIN: CPT

## 2022-11-01 NOTE — ASSESSMENT
[FreeTextEntry1] :  Patient with CLL... Patient brings in his discharge papers from [facility]... On October 23 he had pancytopenia...\par \par Patient has multiple social problems... He lives alone... Has a home attendant 4 hours a day for several days a week...\par \par No active hematological issues.\par \par Follow-up here in 3 months or sooner if needed

## 2022-11-01 NOTE — HISTORY OF PRESENT ILLNESS
Signed and put in bin to be faxed and signed by HIM   [de-identified] : Patient with CLL here for follow up  [de-identified] : He is tolerating Eliquis well without any bruising or bleeding. CBC is stable, Hb and platelet is stable. 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 He is scheduled to see Dr. Muniz from urology tomorrow. \par \par He is on iron tablets, tolerating well. \par \par Received IV Iron x2...\par \par 11-5-2019 getting pains in his feet when he takes the support stockings off...\par \par May 19, 2020 patient requested a follow-up appointment since he had blood work done last week and he wanted to discuss his results\par \par 1/28/2020 pt returns for f/u...seeing multiple docs...[facility], even so his total cholesterol is 164 by their results (which are scanned in...) was also told to go back on Aspirin, but pt has low plt, and had 2 bleeding ulcers...\par \par August 10, 2020 returning for follow-up has no complaints... Unable to visit [facility] this summer because of the pandemic... Keeping social distance for most part\par \par August 18, 2020 patient was found to have worsening thrombocytopenia... I do not believe the thrombocytopenia is secondary to his CLL... Therefore went very carefully over his medication list..\par \par January 21, 2021 patient requested a follow-up appointment to discuss his urinary symptoms... He was seen by urology and he was proposed procedure to improve his nocturia... At this point patient has nocturia x4-5 times and is unable to get a good night sleep...\par \par \par February 11,2021...had repeat blood work yesterday...plt low...\par \par March 13, 2021 patient returns to the office stating "total collapse of the systems"... Unable to eat, short of breath, constipated...\par \par July 14, 2021 returns to the office is doing well... After his recent admission to [facility] for GI bleed, he was readmitted to the [facility] where he was diagnosed with pseudogout, and was placed on prednisone with good relief of his left leg pain\par \par September 22, 2021 patient with CLL returns for follow-up several days before going to [facility] for 3 weeks...\par \par December 13,2021 returns for follow-up... Did not enjoy his last trip to [facility] since he did not have his own accommodations...\par \par March 21, 2022 returns for follow-up no complaints... His arthritic pain is controlled with Tylenol arthritis as opposed to regular Tylenol\par \par \par June 21,2022 main complain jiont pain...taking arthritis Tylenol , but not daily...not travelling to [facility]...\par \par \par September 20, 2022 returns for follow-up accompanied by home health aide... Reports being in the hospital twice in August... [facility] and [facility] weeks later at [facility] for pneumonia... Feeling better now...\par \par November 1, 2022 returns early for follow-up appointment, since he was at [facility]  hospital for 5 days discharged October 23... He was treated for myocarditis... Discharged on the same medications...

## 2022-11-02 ENCOUNTER — NON-APPOINTMENT (OUTPATIENT)
Age: 87
End: 2022-11-02

## 2022-11-02 ENCOUNTER — APPOINTMENT (OUTPATIENT)
Dept: HEART AND VASCULAR | Facility: CLINIC | Age: 87
End: 2022-11-02

## 2022-11-02 VITALS
WEIGHT: 144 LBS | TEMPERATURE: 98.6 F | DIASTOLIC BLOOD PRESSURE: 52 MMHG | OXYGEN SATURATION: 94 % | BODY MASS INDEX: 22.6 KG/M2 | SYSTOLIC BLOOD PRESSURE: 118 MMHG | HEIGHT: 67 IN | HEART RATE: 62 BPM | RESPIRATION RATE: 16 BRPM

## 2022-11-02 DIAGNOSIS — I51.4 MYOCARDITIS, UNSPECIFIED: ICD-10-CM

## 2022-11-02 PROCEDURE — 93000 ELECTROCARDIOGRAM COMPLETE: CPT

## 2022-11-02 PROCEDURE — 99213 OFFICE O/P EST LOW 20 MIN: CPT | Mod: 25

## 2022-11-02 NOTE — HISTORY OF PRESENT ILLNESS
[FreeTextEntry1] : 89 year male who comes for Cariology followup after hospitalization at [facility] following a Flu Vax. He was noted to have elevated Troponin and was admitted for possible myocarditis. He was evaluated with labs and Echocardiogram. His EF was approx 40% with mild to moderate MR and Pulmonary hypertension

## 2022-11-02 NOTE — ASSESSMENT
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 Continue beta blocker\par \par Hypertension--We discussed a goal of /80 or lower in the office. 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

## 2022-12-06 NOTE — PATIENT PROFILE ADULT - OVER THE PAST TWO WEEKS HAVE YOU FELT DOWN, DEPRESSED OR HOPELESS?
Belinda called Dr Hoffmann's office as she having irritation in her belly button that is causing a smell. She didn't know if this is a yeast infection and if the cream she is using would be the right kind.    Penny Bucio can be reached at [phone]  
Noted.  
Patient reports irritation, itching, and redness in her belly button about a month ago. Patient had cleaned navel at that time with alcohol and symptoms did get better.  A few days ago patient noticed redness had returned and now her navel had an odor.   Patient does report yeast infections in her groin area and believes this may be the same thing.  Redness has gone down some today but she can see a white spot in her navel.  Patient is currently using Mupirocin ointment which she believes may be helping some but thinks she may need a different medication.    Uses Ketoconazole cream in groin area when she gets yeast infections.    Patient has an appointment with Dr. Hoffmann on 12/7/2022. Would you like patient seen sooner? Currently no available appointments today.  
Patient scheduled for 9:00 am tomorrow morning with pcp.  
We can see Belinda towards the end of the day today.  
no

## 2022-12-22 ENCOUNTER — NON-APPOINTMENT (OUTPATIENT)
Age: 87
End: 2022-12-22

## 2022-12-28 ENCOUNTER — APPOINTMENT (OUTPATIENT)
Dept: AFTER HOURS CARE | Facility: EMERGENCY ROOM | Age: 87
End: 2022-12-28
Payer: MEDICARE

## 2022-12-28 DIAGNOSIS — R06.01 ORTHOPNEA: ICD-10-CM

## 2022-12-28 PROCEDURE — 99441: CPT | Mod: 95

## 2022-12-28 NOTE — ASSESSMENT
[FreeTextEntry1] : Im worried this pt has serious pathology causing his worsening orthopnea, including pulm edema, worsening of PE, CHF, atypical ACS, etc. As such I strongly encouraged him to go to ED ASAP

## 2022-12-28 NOTE — HISTORY OF PRESENT ILLNESS
[Home] : at home, [unfilled] , at the time of the visit. [Other Location: e.g. 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 Sx worse at night and when pt lays down. Last night the\par pt has such bad sx he slept in a recliner. Never any chest pain.

## 2023-01-03 ENCOUNTER — NON-APPOINTMENT (OUTPATIENT)
Age: 88
End: 2023-01-03

## 2023-01-11 ENCOUNTER — APPOINTMENT (OUTPATIENT)
Dept: HEART AND VASCULAR | Facility: CLINIC | Age: 88
End: 2023-01-11

## 2023-01-23 ENCOUNTER — APPOINTMENT (OUTPATIENT)
Dept: HEMATOLOGY ONCOLOGY | Facility: CLINIC | Age: 88
End: 2023-01-23

## 2023-02-03 RX ORDER — MELOXICAM 7.5 MG/1
7.5 TABLET ORAL DAILY
Refills: 0 | Status: ACTIVE | COMMUNITY
Start: 2023-02-03

## 2023-02-03 RX ORDER — CHLORHEXIDINE GLUCONATE 4 %
325 (65 FE) LIQUID (ML) TOPICAL
Refills: 0 | Status: DISCONTINUED | COMMUNITY
End: 2023-02-03

## 2023-02-03 RX ORDER — PNV NO.95/FERROUS FUM/FOLIC AC 28MG-0.8MG
TABLET ORAL
Refills: 0 | Status: DISCONTINUED | COMMUNITY
End: 2023-02-03

## 2023-02-06 ENCOUNTER — NON-APPOINTMENT (OUTPATIENT)
Age: 88
End: 2023-02-06

## 2023-02-13 ENCOUNTER — APPOINTMENT (OUTPATIENT)
Dept: INTERNAL MEDICINE | Facility: CLINIC | Age: 88
End: 2023-02-13
Payer: MEDICARE

## 2023-02-13 ENCOUNTER — LABORATORY RESULT (OUTPATIENT)
Age: 88
End: 2023-02-13

## 2023-02-13 VITALS
TEMPERATURE: 97.8 F | RESPIRATION RATE: 16 BRPM | SYSTOLIC BLOOD PRESSURE: 118 MMHG | HEIGHT: 67 IN | HEART RATE: 68 BPM | OXYGEN SATURATION: 96 % | WEIGHT: 117 LBS | BODY MASS INDEX: 18.36 KG/M2 | DIASTOLIC BLOOD PRESSURE: 76 MMHG

## 2023-02-13 DIAGNOSIS — N18.4 CHRONIC KIDNEY DISEASE, STAGE 4 (SEVERE): ICD-10-CM

## 2023-02-13 DIAGNOSIS — R26.81 UNSTEADINESS ON FEET: ICD-10-CM

## 2023-02-13 PROCEDURE — 36415 COLL VENOUS BLD VENIPUNCTURE: CPT

## 2023-02-13 PROCEDURE — 99215 OFFICE O/P EST HI 40 MIN: CPT

## 2023-02-13 RX ORDER — ALFUZOSIN HYDROCHLORIDE 10 MG/1
10 TABLET, EXTENDED RELEASE ORAL
Qty: 90 | Refills: 3 | Status: DISCONTINUED | COMMUNITY
Start: 2019-12-19 | End: 2023-02-13

## 2023-02-13 NOTE — REVIEW OF SYSTEMS
[Fatigue] : fatigue [Nocturia] : nocturia [Frequency] : frequency [Joint Pain] : joint pain [Back Pain] : back pain [Depression] : depression [Negative] : Respiratory [Earache] : no earache [Nasal Discharge] : no nasal discharge [FreeTextEntry4] : dry mouth

## 2023-02-13 NOTE — HISTORY OF PRESENT ILLNESS
[de-identified] : 89 yo M w/ hx CLL, DVT, PUD ,CKD 3,  gout, CAD, s/p 2 stents(last in 2010 ),colon polyps, PE after Urolift procedure\par -remains on eliquis- [facility]>\par - frequent urination - nocturia- gets up every hour. reports he stopped the alfuzosin\par - has lost weight  - low energy, poor appetite.  \par - back pain- pinched nerve. \par - very fatigued.\par  - walking has been better.   \par - seeing pain mgmnt doctor tomorrow at [facility] [place]  \par - getting medicaid - will be able to get more help\par \par

## 2023-02-13 NOTE — PLAN
[FreeTextEntry1] : CHF - EF 30-35%\par  - no change in meds - \par - monitor fluid intake\par Anemia - check CBC today\par Neck and back pain \par - tylenol bid dosing\par - f/up PRN with Dr Baltazar\par PE to continue on Eliquis\par HTN- controlled\par CKD 3- for labs today\par - bp controlled\par Nocturia - BPH - cont tamsulosin and finasteride.

## 2023-02-13 NOTE — PHYSICAL EXAM
[Normal Rate] : normal rate  [Regular Rhythm] : with a regular rhythm [Normal] : affect was normal and insight and judgment were intact [de-identified] : thin,  [de-identified] : dry oral mucosa [de-identified] : trace edema b/l le

## 2023-02-15 ENCOUNTER — NON-APPOINTMENT (OUTPATIENT)
Age: 88
End: 2023-02-15

## 2023-02-15 LAB
ALBUMIN SERPL ELPH-MCNC: 4.4 G/DL
ALP BLD-CCNC: 64 U/L
ALT SERPL-CCNC: 11 U/L
ANION GAP SERPL CALC-SCNC: 15 MMOL/L
APPEARANCE: CLEAR
AST SERPL-CCNC: 18 U/L
BASOPHILS # BLD AUTO: 0.09 K/UL
BASOPHILS NFR BLD AUTO: 0.9 %
BILIRUB SERPL-MCNC: 0.5 MG/DL
BILIRUBIN URINE: NEGATIVE
BLOOD URINE: NEGATIVE
BUN SERPL-MCNC: 64 MG/DL
CALCIUM SERPL-MCNC: 9.1 MG/DL
CHLORIDE SERPL-SCNC: 104 MMOL/L
CHOLEST SERPL-MCNC: 174 MG/DL
CO2 SERPL-SCNC: 23 MMOL/L
COLOR: YELLOW
CREAT SERPL-MCNC: 1.82 MG/DL
EGFR: 34 ML/MIN/1.73M2
EOSINOPHIL # BLD AUTO: 0.26 K/UL
EOSINOPHIL NFR BLD AUTO: 2.5 %
ESTIMATED AVERAGE GLUCOSE: 123 MG/DL
GLUCOSE QUALITATIVE U: NEGATIVE
GLUCOSE SERPL-MCNC: 115 MG/DL
HBA1C MFR BLD HPLC: 5.9 %
HCT VFR BLD CALC: 30.6 %
HDLC SERPL-MCNC: 41 MG/DL
HGB BLD-MCNC: 9.3 G/DL
KETONES URINE: NEGATIVE
LDLC SERPL CALC-MCNC: 98 MG/DL
LEUKOCYTE ESTERASE URINE: NEGATIVE
LYMPHOCYTES # BLD AUTO: 9.23 K/UL
LYMPHOCYTES NFR BLD AUTO: 88.1 %
MAN DIFF?: NORMAL
MCHC RBC-ENTMCNC: 29.3 PG
MCHC RBC-ENTMCNC: 30.4 GM/DL
MCV RBC AUTO: 96.5 FL
MONOCYTES # BLD AUTO: 0.36 K/UL
MONOCYTES NFR BLD AUTO: 3.4 %
NEUTROPHILS # BLD AUTO: 0.53 K/UL
NEUTROPHILS NFR BLD AUTO: 5.1 %
NITRITE URINE: NEGATIVE
NONHDLC SERPL-MCNC: 133 MG/DL
PH URINE: 5.5
PLATELET # BLD AUTO: 137 K/UL
POTASSIUM SERPL-SCNC: 4.6 MMOL/L
PROT SERPL-MCNC: 6.9 G/DL
PROTEIN URINE: NORMAL
RBC # BLD: 3.17 M/UL
RBC # FLD: 16.6 %
SODIUM SERPL-SCNC: 142 MMOL/L
SPECIFIC GRAVITY URINE: 1.02
T4 FREE SERPL-MCNC: 1.4 NG/DL
TRIGL SERPL-MCNC: 176 MG/DL
TSH SERPL-ACNC: 7.73 UIU/ML
UROBILINOGEN URINE: NORMAL
VIT B12 SERPL-MCNC: >2000 PG/ML
WBC # FLD AUTO: 10.48 K/UL

## 2023-02-23 ENCOUNTER — RX RENEWAL (OUTPATIENT)
Age: 88
End: 2023-02-23

## 2023-02-27 ENCOUNTER — APPOINTMENT (OUTPATIENT)
Dept: HEMATOLOGY ONCOLOGY | Facility: CLINIC | Age: 88
End: 2023-02-27
Payer: MEDICARE

## 2023-02-27 VITALS
BODY MASS INDEX: 17.89 KG/M2 | OXYGEN SATURATION: 99 % | SYSTOLIC BLOOD PRESSURE: 114 MMHG | DIASTOLIC BLOOD PRESSURE: 56 MMHG | HEIGHT: 67 IN | TEMPERATURE: 97.6 F | HEART RATE: 53 BPM | WEIGHT: 114 LBS

## 2023-02-27 DIAGNOSIS — C91.10 CHRONIC LYMPHOCYTIC LEUKEMIA OF B-CELL TYPE NOT HAVING ACHIEVED REMISSION: ICD-10-CM

## 2023-02-27 DIAGNOSIS — I82.409 ACUTE EMBOLISM AND THROMBOSIS OF UNSPECIFIED DEEP VEINS OF UNSPECIFIED LOWER EXTREMITY: ICD-10-CM

## 2023-02-27 PROCEDURE — 99214 OFFICE O/P EST MOD 30 MIN: CPT | Mod: 25

## 2023-02-27 RX ORDER — OMEPRAZOLE 20 MG/1
20 CAPSULE, DELAYED RELEASE ORAL
Refills: 0 | Status: DISCONTINUED | COMMUNITY
Start: 2023-02-03 | End: 2023-02-27

## 2023-02-27 RX ORDER — FUROSEMIDE 40 MG/1
40 TABLET ORAL
Refills: 0 | Status: COMPLETED | COMMUNITY
Start: 2023-02-03 | End: 2023-02-27

## 2023-02-27 RX ORDER — MULTIPLE VITAMINS W/ MINERALS TAB 9MG-400MCG
TAB ORAL
Qty: 90 | Refills: 3 | Status: ACTIVE | COMMUNITY
Start: 2023-02-03 | End: 1900-01-01

## 2023-02-27 NOTE — HISTORY OF PRESENT ILLNESS
[de-identified] : Patient with CLL here for follow up  [de-identified] : He is tolerating Eliquis well without any bruising or bleeding. CBC is stable, Hb and platelet is stable. 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 He is scheduled to see Dr. Muniz from urology tomorrow. \par \par He is on iron tablets, tolerating well. \par \par Received IV Iron x2...\par \par 11-5-2019 getting pains in his feet when he takes the support stockings off...\par \par May 19, 2020 patient requested a follow-up appointment since he had blood work done last week and he wanted to discuss his results\par \par 1/28/2020 pt returns for f/u...seeing multiple docs...[facility], even so his total cholesterol is 164 by their results (which are scanned in...) was also told to go back on Aspirin, but pt has low plt, and had 2 bleeding ulcers...\par \par August 10, 2020 returning for follow-up has no complaints... Unable to visit [facility] this summer because of the pandemic... Keeping social distance for most part\par \par August 18, 2020 patient was found to have worsening thrombocytopenia... I do not believe the thrombocytopenia is secondary to his CLL... Therefore went very carefully over his medication list..\par \par January 21, 2021 patient requested a follow-up appointment to discuss his urinary symptoms... He was seen by urology and he was proposed procedure to improve his nocturia... At this point patient has nocturia x4-5 times and is unable to get a good night sleep...\par \par \par February 11,2021...had repeat blood work yesterday...plt low...\par \par March 13, 2021 patient returns to the office stating "total collapse of the systems"... Unable to eat, short of breath, constipated...\par \par July 14, 2021 returns to the office is doing well... After his recent admission to [facility] for GI bleed, he was readmitted to the [facility] where he was diagnosed with pseudogout, and was placed on prednisone with good relief of his left leg pain\par \par September 22, 2021 patient with CLL returns for follow-up several days before going to [facility] for 3 weeks...\par \par December 13,2021 returns for follow-up... Did not enjoy his last trip to [facility] since he did not have his own accommodations...\par \par March 21, 2022 returns for follow-up no complaints... His arthritic pain is controlled with Tylenol arthritis as opposed to regular Tylenol\par \par \par June 21,2022 main complain jiont pain...taking arthritis Tylenol , but not daily...not travelling to [facility]...\par \par \par September 20, 2022 returns for follow-up accompanied by home health aide... Reports being in the hospital twice in August... [facility], and several weeks later at [facility] for pneumonia... Feeling better now...\par \par November 1, 2022 returns early for follow-up appointment, since he was at [facility]  hospital for 5 days discharged October 23... He was treated for myocarditis... Discharged on the same medications... \par \par February 27, 2023 returns for follow-up... Patient states he was hospitalized at [facility] for 15 days for shortness of breath... He was later sent to rehab... Complaining of the food.  Nurse at the rehab..." Pasta, Pasta, Pasta"...\par \par \par \par

## 2023-02-27 NOTE — ASSESSMENT
[FreeTextEntry1] :  Patient with CLL... Most recent blood work done by PCP iban...\par \par Patient has multiple social problems... He lives alone... Has a home attendant 4 hours a day for several days a week...\par \par No active hematological issues....\par \par Because of geographical convenience patient chooses to continue his care with hematology at [facility]... He is provided with a copy of this record and any other records he might need will be sent to his new hematologist.\par

## 2023-02-28 ENCOUNTER — APPOINTMENT (OUTPATIENT)
Dept: UROLOGY | Facility: CLINIC | Age: 88
End: 2023-02-28

## 2023-03-01 ENCOUNTER — APPOINTMENT (OUTPATIENT)
Dept: HEART AND VASCULAR | Facility: CLINIC | Age: 88
End: 2023-03-01
Payer: MEDICARE

## 2023-03-01 VITALS
DIASTOLIC BLOOD PRESSURE: 44 MMHG | HEART RATE: 68 BPM | SYSTOLIC BLOOD PRESSURE: 74 MMHG | RESPIRATION RATE: 16 BRPM | TEMPERATURE: 97.4 F | WEIGHT: 117 LBS | OXYGEN SATURATION: 98 % | HEIGHT: 67 IN | BODY MASS INDEX: 18.36 KG/M2

## 2023-03-01 VITALS — DIASTOLIC BLOOD PRESSURE: 70 MMHG | SYSTOLIC BLOOD PRESSURE: 80 MMHG

## 2023-03-01 DIAGNOSIS — I10 ESSENTIAL (PRIMARY) HYPERTENSION: ICD-10-CM

## 2023-03-01 PROCEDURE — 99214 OFFICE O/P EST MOD 30 MIN: CPT

## 2023-03-01 PROCEDURE — 93000 ELECTROCARDIOGRAM COMPLETE: CPT

## 2023-03-01 RX ORDER — AMLODIPINE BESYLATE 2.5 MG/1
2.5 TABLET ORAL DAILY
Qty: 30 | Refills: 2 | Status: DISCONTINUED | COMMUNITY
Start: 2021-05-25 | End: 2023-03-01

## 2023-03-01 RX ORDER — GABAPENTIN 100 MG/1
100 CAPSULE ORAL 3 TIMES DAILY
Qty: 270 | Refills: 1 | Status: ACTIVE | COMMUNITY
Start: 2022-09-13

## 2023-03-01 NOTE — ASSESSMENT
[FreeTextEntry1] : An EKG was performed to evaluate for arrhythmia and ischemia.\par \par Hypertension-- Stop Amlodipine\par \par At the time of his visit I contacted Dr oCx who spoke with the patient. If drowsiness continues to reduce Gabapentin. Patient informed me he will see about stopping Gabapentin at night.\par '\par I encouraged continued risk factor reduction and gradual increase in aerobic activity as tolerated\par \par  33  minutes were spent discussing cardiac risk excluding procedure time

## 2023-03-01 NOTE — HISTORY OF PRESENT ILLNESS
[FreeTextEntry1] : 89 year male who comes for Cardiology followup. He describes taking Gabapentin and feeling sleepy. He denies having any chest pain, SOB, FISH, dizziness, palpitations, orthopnea, PND or syncope

## 2023-03-03 ENCOUNTER — NON-APPOINTMENT (OUTPATIENT)
Age: 88
End: 2023-03-03

## 2023-03-06 ENCOUNTER — APPOINTMENT (OUTPATIENT)
Dept: INTERNAL MEDICINE | Facility: CLINIC | Age: 88
End: 2023-03-06

## 2023-03-10 NOTE — DISCHARGE NOTE NURSING/CASE MANAGEMENT/SOCIAL WORK - NSDCPEELIQUISREACT_GEN_ALL_CORE
Apixaban/Eliquis increases your risk for bleeding. Notify your doctor if you experience any of the following side effects: bleeding, coughing or vomiting blood, red or black stool, unexpected pain or swelling, itching or hives, chest pain, chest tightness, trouble breathing, changes in how much or how often you urinate, red or pink urine, numbness or tingling in your feet, or unusual muscle weakness. When Apixaban/Eliquis is taken with other medicines, they can affect how it works. Taking other medications such as aspirin, blood thinners, nonsteroidal anti-inflammatories, and medications that treat depression can increase your risk of bleeding. It is very important to tell your health care provider about all of the other medicines, including over-the-counter medications, herbs, and vitamins you are taking. DO NOT start, stop, or change the dosage of any medicine, including over-the-counter medicines, vitamins, and herbal products without your doctor’s approval. Any products containing aspirin or are nonsteroidal anti-inflammatories lessen the blood’s ability to form clots and add to the effect of Apixaban/Eliquis. Never take aspirin or medicines that contain aspirin without speaking to your doctor. - on flagyl and cefepime  - management as per primary team. - s/p antibiotics  - symptom management as above

## 2023-03-14 ENCOUNTER — APPOINTMENT (OUTPATIENT)
Dept: VASCULAR SURGERY | Facility: CLINIC | Age: 88
End: 2023-03-14
Payer: MEDICARE

## 2023-03-14 VITALS
BODY MASS INDEX: 18.36 KG/M2 | DIASTOLIC BLOOD PRESSURE: 58 MMHG | WEIGHT: 117 LBS | HEIGHT: 67 IN | SYSTOLIC BLOOD PRESSURE: 104 MMHG | HEART RATE: 67 BPM

## 2023-03-14 DIAGNOSIS — I77.9 DISORDER OF ARTERIES AND ARTERIOLES, UNSPECIFIED: ICD-10-CM

## 2023-03-14 PROCEDURE — 93880 EXTRACRANIAL BILAT STUDY: CPT

## 2023-03-14 PROCEDURE — 99213 OFFICE O/P EST LOW 20 MIN: CPT

## 2023-03-18 NOTE — PHYSICAL EXAM
[Normal Breath Sounds] : Normal breath sounds [Normal Heart Sounds] : normal heart sounds [2+] : left 2+ [Ankle Swelling Bilaterally] : bilaterally  [Ankle Swelling On The Right] : mild [No Rash or Lesion] : No rash or lesion [Alert] : alert [Calm] : calm [JVD] : no jugular venous distention  [Carotid Bruits] : no carotid bruits [Ankle Swelling (On Exam)] : not present [Varicose Veins Of Lower Extremities] : not present [] : not present [Abdomen Tenderness] : ~T ~M No abdominal tenderness [de-identified] : WN/WD, NAD [de-identified] : NC/AT [de-identified] : FROM throughout, strength 5/5x4 [de-identified] : CNII-XII/HEYDI grossly intact

## 2023-03-18 NOTE — ADDENDUM
[FreeTextEntry1] : This note was written by Los Armendariz, acting as a scribe for Dr. Radha Andrade.  I, Dr. Radha Andrade, have read and attest that all the information, medical decision-making, and discharge instructions within are true and accurate.\par \par I, Dr. Radha Andrade, personally performed the evaluation and management (E/M) services for this established patient who presents today with (an) existing condition(s).  That E/M includes conducting the examination, assessing all conditions, and (re)establishing/reinforcing a plan of care.  Today, my ACP, Los Armendariz, was here to observe my evaluation and management services for this condition to be followed going forward.

## 2023-03-18 NOTE — PROCEDURE
Final urine culture report is NEGATIVE per [facility] ED Lab Result protocol.    If NEGATIVE result, no change in treatment, per [facility] ED Lab Result protocol. [FreeTextEntry1] : Carotid duplex done today to evaluate carotid stenosis demonstrates stable L ICA stenosis >70% velocities measuring 233/65cm/sec, ARTEMIO 50-69% stenosis and no change from previous duplex

## 2023-03-24 ENCOUNTER — APPOINTMENT (OUTPATIENT)
Dept: UROLOGY | Facility: CLINIC | Age: 88
End: 2023-03-24
Payer: MEDICARE

## 2023-03-24 VITALS
DIASTOLIC BLOOD PRESSURE: 60 MMHG | WEIGHT: 117 LBS | BODY MASS INDEX: 18.36 KG/M2 | SYSTOLIC BLOOD PRESSURE: 114 MMHG | HEIGHT: 67 IN | HEART RATE: 69 BPM

## 2023-03-24 DIAGNOSIS — N40.1 BENIGN PROSTATIC HYPERPLASIA WITH LOWER URINARY TRACT SYMPMS: ICD-10-CM

## 2023-03-24 DIAGNOSIS — R35.1 BENIGN PROSTATIC HYPERPLASIA WITH LOWER URINARY TRACT SYMPMS: ICD-10-CM

## 2023-03-24 PROCEDURE — 99214 OFFICE O/P EST MOD 30 MIN: CPT

## 2023-03-24 RX ORDER — TAMSULOSIN HYDROCHLORIDE 0.4 MG/1
0.4 CAPSULE ORAL
Qty: 90 | Refills: 3 | Status: ACTIVE | COMMUNITY
Start: 2023-02-03 | End: 1900-01-01

## 2023-03-27 LAB
APPEARANCE: CLEAR
BACTERIA UR CULT: NORMAL
BACTERIA: NEGATIVE
BILIRUBIN URINE: NEGATIVE
BLOOD URINE: NEGATIVE
COLOR: YELLOW
GLUCOSE QUALITATIVE U: NEGATIVE
HYALINE CASTS: 1 /LPF
KETONES URINE: NEGATIVE
LEUKOCYTE ESTERASE URINE: NEGATIVE
MICROSCOPIC-UA: NORMAL
NITRITE URINE: NEGATIVE
PH URINE: 5
PROTEIN URINE: NORMAL
RED BLOOD CELLS URINE: 5 /HPF
SPECIFIC GRAVITY URINE: 1.02
SQUAMOUS EPITHELIAL CELLS: 0 /HPF
UROBILINOGEN URINE: NORMAL
WHITE BLOOD CELLS URINE: 1 /HPF

## 2023-03-27 NOTE — ASSESSMENT
[FreeTextEntry1] : 89M s/p urolift\par \par BPH w/ Nocturia\par - UA, UCx\par - continue tamsulosin. 0.4\par - f/u 1 year

## 2023-03-27 NOTE — HISTORY OF PRESENT ILLNESS
[FreeTextEntry1] : 89M w PMHx of CHF, s/p urolift 3/2021, recently out of hospital stay 1/2023.\par \par Patient staets he was changed from alfuzosin 10 mg to tamsulosin 0.4mg while in hospital. Reports change in sx management was minimal. \par \par - Patient complaining of increased nocturia (hourly) and daytime frequency. \par - denies dysuria, fever, chills, urgency, hematuria\par

## 2023-03-27 NOTE — PHYSICAL EXAM
[Normal Appearance] : normal appearance [] : no respiratory distress [Oriented To Time, Place, And Person] : oriented to person, place, and time [FreeTextEntry1] : uses mobility aid to ambulate

## 2023-04-25 RX ORDER — FINASTERIDE 5 MG/1
5 TABLET, FILM COATED ORAL DAILY
Qty: 90 | Refills: 3 | Status: ACTIVE | COMMUNITY
Start: 2020-01-17 | End: 1900-01-01

## 2023-05-11 DIAGNOSIS — N39.3 STRESS INCONTINENCE (FEMALE) (MALE): ICD-10-CM

## 2023-06-05 ENCOUNTER — RX RENEWAL (OUTPATIENT)
Age: 88
End: 2023-06-05

## 2023-06-05 RX ORDER — METOPROLOL SUCCINATE 50 MG/1
50 TABLET, EXTENDED RELEASE ORAL DAILY
Qty: 30 | Refills: 5 | Status: ACTIVE | COMMUNITY
Start: 2021-05-25 | End: 1900-01-01

## 2023-06-09 ENCOUNTER — APPOINTMENT (OUTPATIENT)
Dept: HEART AND VASCULAR | Facility: CLINIC | Age: 88
End: 2023-06-09
Payer: MEDICARE

## 2023-06-09 ENCOUNTER — NON-APPOINTMENT (OUTPATIENT)
Age: 88
End: 2023-06-09

## 2023-06-09 VITALS
BODY MASS INDEX: 21.5 KG/M2 | DIASTOLIC BLOOD PRESSURE: 70 MMHG | HEART RATE: 64 BPM | TEMPERATURE: 97.3 F | OXYGEN SATURATION: 95 % | WEIGHT: 137 LBS | SYSTOLIC BLOOD PRESSURE: 113 MMHG | HEIGHT: 67 IN

## 2023-06-09 DIAGNOSIS — I25.10 ATHEROSCLEROTIC HEART DISEASE OF NATIVE CORONARY ARTERY W/OUT ANGINA PECTORIS: ICD-10-CM

## 2023-06-09 PROCEDURE — 99213 OFFICE O/P EST LOW 20 MIN: CPT

## 2023-06-09 PROCEDURE — 93000 ELECTROCARDIOGRAM COMPLETE: CPT

## 2023-06-09 NOTE — ASSESSMENT
[FreeTextEntry1] : An EKG was performed to evaluate for arrhythmia and ischemia.\par \par continue Eliquis and Metoprolol\par \par I encouraged continued risk factor reduction and gradual increase in aerobic activity as tolerated\par \par 28   minutes were spent discussing cardiac risk excluding procedure time

## 2023-06-09 NOTE — HISTORY OF PRESENT ILLNESS
[FreeTextEntry1] : 89 year male who comes for cardiology followup of his rhythm. He is being treated at [facility] for Leukemia. He had a BM and is on oral medication.. He denies having having new chest pain, SOB, FISH, dizziness, palpitations, orthopnea, PND or syncope. He plans to travel to [facility] and [facility] in September

## 2023-06-13 RX ORDER — PANTOPRAZOLE 40 MG/1
40 TABLET, DELAYED RELEASE ORAL
Qty: 90 | Refills: 3 | Status: ACTIVE | COMMUNITY
Start: 2022-07-09 | End: 1900-01-01

## 2023-07-10 ENCOUNTER — NON-APPOINTMENT (OUTPATIENT)
Age: 88
End: 2023-07-10

## 2023-07-10 RX ORDER — APIXABAN 2.5 MG/1
2.5 TABLET, FILM COATED ORAL
Qty: 60 | Refills: 2 | Status: DISCONTINUED | COMMUNITY
End: 2023-07-10

## 2023-08-07 ENCOUNTER — NON-APPOINTMENT (OUTPATIENT)
Age: 88
End: 2023-08-07

## 2023-08-30 ENCOUNTER — APPOINTMENT (OUTPATIENT)
Dept: ORTHOPEDIC SURGERY | Facility: CLINIC | Age: 88
End: 2023-08-30

## 2023-09-08 ENCOUNTER — APPOINTMENT (OUTPATIENT)
Dept: HEART AND VASCULAR | Facility: CLINIC | Age: 88
End: 2023-09-08

## 2023-09-19 ENCOUNTER — APPOINTMENT (OUTPATIENT)
Dept: VASCULAR SURGERY | Facility: CLINIC | Age: 88
End: 2023-09-19

## 2023-09-21 ENCOUNTER — NON-APPOINTMENT (OUTPATIENT)
Age: 88
End: 2023-09-21

## 2024-03-28 NOTE — ED ADULT NURSE NOTE - TEMPLATE LIST FOR HEAD TO TOE ASSESSMENT
RN left message to offer appointment with Dr. Caballero 4/3 @ 1130 d/t pt cancellation. Patient is currently on waitlist to see Dr. Caballero. Intake completed 2/19/24   Informed patient we would hold appt until 1120 am. Requested return call if interested in scheduling. Clinic number provided.   
General

## 2025-05-22 NOTE — PHYSICAL THERAPY INITIAL EVALUATION ADULT - BALANCE DISTURBANCE, SYSTEM IMPAIRMENT CONTRIBUTE, REHAB EVAL
Problem: Skin Injury Risk Increased  Goal: Skin Health and Integrity  Outcome: Progressing     Problem: Adult Inpatient Plan of Care  Goal: Plan of Care Review  Outcome: Progressing  Goal: Patient-Specific Goal (Individualized)  Outcome: Progressing  Goal: Absence of Hospital-Acquired Illness or Injury  Outcome: Progressing  Goal: Optimal Comfort and Wellbeing  Outcome: Progressing  Goal: Readiness for Transition of Care  Outcome: Progressing     Problem: Wound  Goal: Optimal Coping  Outcome: Progressing  Goal: Optimal Functional Ability  Outcome: Progressing  Goal: Absence of Infection Signs and Symptoms  Outcome: Progressing  Goal: Improved Oral Intake  Outcome: Progressing  Goal: Optimal Pain Control and Function  Outcome: Progressing  Goal: Skin Health and Integrity  Outcome: Progressing  Goal: Optimal Wound Healing  Outcome: Progressing     Problem: Bariatric Environmental Safety  Goal: Safety Maintained with Care  Outcome: Progressing     Problem: Fall Injury Risk  Goal: Absence of Fall and Fall-Related Injury  Outcome: Progressing     Problem: Pain Acute  Goal: Optimal Pain Control and Function  Outcome: Progressing     Problem: Infection  Goal: Absence of Infection Signs and Symptoms  Outcome: Progressing     Problem: Hemodialysis  Goal: Safe, Effective Therapy Delivery  Outcome: Progressing  Goal: Effective Tissue Perfusion  Outcome: Progressing  Goal: Absence of Infection Signs and Symptoms  Outcome: Progressing      musculoskeletal